# Patient Record
Sex: MALE | Race: AMERICAN INDIAN OR ALASKA NATIVE
[De-identification: names, ages, dates, MRNs, and addresses within clinical notes are randomized per-mention and may not be internally consistent; named-entity substitution may affect disease eponyms.]

---

## 2017-01-16 ENCOUNTER — HOSPITAL ENCOUNTER (INPATIENT)
Dept: HOSPITAL 5 - ED | Age: 47
LOS: 4 days | Discharge: HOME | DRG: 391 | End: 2017-01-20
Attending: HOSPITALIST | Admitting: HOSPITALIST
Payer: MEDICARE

## 2017-01-16 DIAGNOSIS — T50.995A: ICD-10-CM

## 2017-01-16 DIAGNOSIS — E87.2: ICD-10-CM
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
## 2017-01-16 DIAGNOSIS — K29.80: ICD-10-CM

## 2017-01-16 DIAGNOSIS — I12.0: ICD-10-CM

## 2017-01-16 DIAGNOSIS — D63.1: ICD-10-CM

## 2017-01-16 DIAGNOSIS — E21.3: ICD-10-CM

## 2017-01-16 DIAGNOSIS — N18.6: ICD-10-CM

## 2017-01-16 DIAGNOSIS — K29.00: Primary | ICD-10-CM

## 2017-01-16 LAB
ALBUMIN SERPL-MCNC: 4.4 G/DL (ref 3.9–5)
ALBUMIN/GLOB SERPL: 1.5 %
ALP SERPL-CCNC: 84 UNITS/L (ref 35–129)
ALT SERPL-CCNC: 15 UNITS/L (ref 7–56)
ANION GAP SERPL CALC-SCNC: 29 MMOL/L
APTT BLD: 35.1 SEC. (ref 24.2–36.6)
BACTERIA #/AREA URNS HPF: (no result) /HPF
BASOPHILS NFR BLD AUTO: 1.1 % (ref 0–1.8)
BILIRUB DIRECT SERPL-MCNC: < 0.2 MG/DL (ref 0–0.2)
BILIRUB INDIRECT SERPL-MCNC: 0.2 MG/DL
BILIRUB SERPL-MCNC: 0.4 MG/DL (ref 0.1–1.2)
BILIRUB UR QL STRIP: (no result)
BLOOD UR QL VISUAL: (no result)
BUN SERPL-MCNC: 77 MG/DL (ref 9–20)
BUN/CREAT SERPL: 4.96 %
CALCIUM SERPL-MCNC: 8.3 MG/DL (ref 8.4–10.2)
CHLORIDE SERPL-SCNC: 94.3 MMOL/L (ref 98–107)
CO2 SERPL-SCNC: 18 MMOL/L (ref 22–30)
EOSINOPHIL NFR BLD AUTO: 9.2 % (ref 0–4.3)
GLUCOSE SERPL-MCNC: 86 MG/DL (ref 75–100)
HCT VFR BLD CALC: 32.7 % (ref 35.5–45.6)
HGB BLD-MCNC: 10.6 GM/DL (ref 11.8–15.2)
INR PPP: 1.34 (ref 0.87–1.13)
KETONES UR STRIP-MCNC: (no result) MG/DL
LEUKOCYTE ESTERASE UR QL STRIP: (no result)
LIPASE SERPL-CCNC: 21 UNITS/L (ref 13–60)
MCH RBC QN AUTO: 31 PG (ref 28–32)
MCHC RBC AUTO-ENTMCNC: 33 % (ref 32–34)
MCV RBC AUTO: 96 FL (ref 84–94)
NITRITE UR QL STRIP: (no result)
PH UR STRIP: 7 [PH] (ref 5–7)
PLATELET # BLD: 173 K/MM3 (ref 140–440)
POTASSIUM SERPL-SCNC: 4.9 MMOL/L (ref 3.6–5)
PROT SERPL-MCNC: 7.4 G/DL (ref 6.3–8.2)
RBC # BLD AUTO: 3.42 M/MM3 (ref 3.65–5.03)
RBC #/AREA URNS HPF: 1 /HPF (ref 0–6)
SODIUM SERPL-SCNC: 136 MMOL/L (ref 137–145)
UROBILINOGEN UR-MCNC: < 2 MG/DL (ref ?–2)
WBC # BLD AUTO: 5.7 K/MM3 (ref 4.5–11)
WBC #/AREA URNS HPF: < 1 /HPF (ref 0–6)

## 2017-01-16 PROCEDURE — 84484 ASSAY OF TROPONIN QUANT: CPT

## 2017-01-16 PROCEDURE — 85730 THROMBOPLASTIN TIME PARTIAL: CPT

## 2017-01-16 PROCEDURE — 96375 TX/PRO/DX INJ NEW DRUG ADDON: CPT

## 2017-01-16 PROCEDURE — 80074 ACUTE HEPATITIS PANEL: CPT

## 2017-01-16 PROCEDURE — 96374 THER/PROPH/DIAG INJ IV PUSH: CPT

## 2017-01-16 PROCEDURE — 88305 TISSUE EXAM BY PATHOLOGIST: CPT

## 2017-01-16 PROCEDURE — 88342 IMHCHEM/IMCYTCHM 1ST ANTB: CPT

## 2017-01-16 PROCEDURE — 71020: CPT

## 2017-01-16 PROCEDURE — 81001 URINALYSIS AUTO W/SCOPE: CPT

## 2017-01-16 PROCEDURE — 85025 COMPLETE CBC W/AUTO DIFF WBC: CPT

## 2017-01-16 PROCEDURE — 80048 BASIC METABOLIC PNL TOTAL CA: CPT

## 2017-01-16 PROCEDURE — 93010 ELECTROCARDIOGRAM REPORT: CPT

## 2017-01-16 PROCEDURE — 93005 ELECTROCARDIOGRAM TRACING: CPT

## 2017-01-16 PROCEDURE — 83690 ASSAY OF LIPASE: CPT

## 2017-01-16 PROCEDURE — 85610 PROTHROMBIN TIME: CPT

## 2017-01-16 PROCEDURE — 36415 COLL VENOUS BLD VENIPUNCTURE: CPT

## 2017-01-16 PROCEDURE — 74176 CT ABD & PELVIS W/O CONTRAST: CPT

## 2017-01-16 PROCEDURE — C9113 INJ PANTOPRAZOLE SODIUM, VIA: HCPCS

## 2017-01-16 RX ADMIN — HYDRALAZINE HYDROCHLORIDE SCH MG: 25 TABLET, FILM COATED ORAL at 17:24

## 2017-01-16 RX ADMIN — HYDRALAZINE HYDROCHLORIDE SCH: 25 TABLET, FILM COATED ORAL at 21:45

## 2017-01-16 RX ADMIN — NIFEDIPINE SCH MG: 90 TABLET, EXTENDED RELEASE ORAL at 21:44

## 2017-01-16 RX ADMIN — FUROSEMIDE SCH MG: 40 TABLET ORAL at 21:44

## 2017-01-16 RX ADMIN — FAMOTIDINE SCH MG: 10 INJECTION, SOLUTION INTRAVENOUS at 17:24

## 2017-01-16 RX ADMIN — CARVEDILOL SCH MG: 25 TABLET, FILM COATED ORAL at 21:44

## 2017-01-16 NOTE — EMERGENCY DEPARTMENT REPORT
HPI





- General


Chief Complaint: Dyspnea/Respdistress


Time Seen by Provider: 01/16/17 14:08





- HPI


HPI: 





 


Chief complaint: Abdominal pain, constipation, decreased appetite and shortness 

of breath


HPI: Patient presents complaining of abdominal pain especially epigastric 

radiating to the entire abdomen since last Thursday.  Patient describes it as 

constant and sharp.  Patient states he's not had a bowel movement since 

Thursday and does not feel hungry.  Patient complains of nausea but no 

vomiting.  Patient does not think he's had a fever but he feels alternately hot 

and cold.  Patient states he's been constipated and had similar pain in the 

past but it appears to be worse this time.  Patient had his last dialysis 

Thursday and skipped dialysis on Saturday and is due for dialysis tomorrow.  

Patient denies any previous abdominal surgery.


Mode of arrival:  EMS


Source: Patient 


Began: Last Thursday


Duration: Continuous


Context: See above


Quality: Sharp


Severity:  8 out of 10


Improved with: Nothing


Worsened with: Palpation


Associated signs and symptoms: See above.  No edema. 





ED Past Medical Hx





- Past Medical History


Previous Medical History?: Yes


Hx Hypertension: Yes


Hx Renal Disease: Yes





- Surgical History


Past Surgical History?: Yes


Additional Surgical History: Left A/V Graft





- Medications


Home Medications: 


 Home Medications











 Medication  Instructions  Recorded  Confirmed  Last Taken  Type


 


Carvedilol [Coreg] 25 mg PO QDAY 01/16/17 01/16/17 01/15/17 History


 


Furosemide [Lasix TAB] 80 mg PO 4XW 01/16/17 01/16/17 01/15/17 History


 


Minoxidil [Loniten] 2.5 mg PO QDAY 01/16/17 01/16/17 01/15/17 History


 


NIFEdipine XL [Procardia Xl] 90 mg PO QDAY 01/16/17 01/16/17 01/15/17 History


 


Warfarin [Coumadin] 7.5 mg PO QDAY 01/16/17 01/16/17 01/15/17 History


 


cloNIDine-TTS PATCH [Catapres-Tts 1 patch TD Q7D 01/16/17 01/16/17 Unknown 

History





Patch]     


 


hydrALAZINE [Apresoline] 50 mg PO Q8HR 01/16/17 01/16/17 01/15/17 History














ED Review of Systems


ROS: 


Stated complaint: ROSALBA/ABD/BACK PAIN


Other details as noted in HPI


ROS





Constitutional: No fever 


ENT: No uri symptoms


Cardiovascular: No chest pain


Respiratory: No sob or cough


GI: No  vomiting or diarrhea


: End-stage renal disease on hemodialysis


Skin: No rash


Neuro: No focal weakness or numbness


Psych: No depression


Hema/lymph: No edema





Physical Exam





- Physical Exam


Vital Signs: 


 Vital Signs











  01/16/17 01/16/17





  08:22 14:05


 


Temperature 98.2 F 


 


Pulse Rate 82 74


 


Respiratory 18 18





Rate  


 


Blood Pressure 141/89 


 


Blood Pressure  165/95





[Right]  


 


O2 Sat by Pulse 97 95





Oximetry  











Physical Exam: 





GENERAL: The patient is well-developed well-nourished . 


HEENT: Normocephalic.  Atraumatic.  Extraocular motions are intact.  Patient 

has moist mucous membranes.


NECK: Supple.  No meningitic signs are noted.  There is no adenopathy noted.


CHEST/LUNGS: Clear to auscultation.  There is no respiratory distress noted.


HEART/CARDIOVASCULAR: Regular.  There is no tachycardia.  There is no gallop 

rub or murmur.


ABDOMEN: Abdomen is soft and tender epigastric area without rebound or 

guarding.  Patient has normal bowel sounds.  There is no abdominal distention.


SKIN: There is no rash.  There is no edema.  There is no diaphoresis.


NEURO: The patient is awake, alert, and oriented.  The patient is cooperative.  

The patient has no focal neurologic deficits.  The patient has normal speech.


MUSCULOSKELETAL: There is no tenderness or deformity.  There is no limitation 

range of motion.  There is no evidence of acute injury.





ED Course


 Vital Signs











  01/16/17 01/16/17





  08:22 14:05


 


Temperature 98.2 F 


 


Pulse Rate 82 74


 


Respiratory 18 18





Rate  


 


Blood Pressure 141/89 


 


Blood Pressure  165/95





[Right]  


 


O2 Sat by Pulse 97 95





Oximetry  














- Reevaluation(s)


Reevaluation #1: 





01/16/17 16:15


Discussed with nephrology who requested the patient be admitted to the hospital 

for further evaluation by gastroenterology and for dialysis.  Patient was 

medicated with morphine, Protonix and Zofran with improvement.  INR is pending.

  Patient will be admitted to the hospitalist.





ED Medical Decision Making





- Lab Data


Result diagrams: 


 01/16/17 09:00





 01/16/17 09:00





 Laboratory Tests











  01/16/17





  09:00


 


Estimated GFR  4


 


BUN/Creatinine Ratio  4.96


 


Calcium  8.3 L


 


Troponin T  < 0.010














- EKG Data


-: EKG Interpreted by Me


EKG shows normal: sinus rhythm (79)





- EKG Data


When compared to previous EKG there are: previous EKG unavailable


Interpretation: normal EKG (with possible left atrial enlargement.)





- Radiology Data


Radiology results: report reviewed (CT the abdomen does not show any acute 

changes other than a small amount of free fluid in the pelvis area.)


Critical care attestation.: 


If time is entered above; I have spent that time in minutes in the direct care 

of this critically ill patient, excluding procedure time.








ED Disposition


Clinical Impression: 


 Acute on chronic renal failure





Abdominal pain


Qualifiers:


 Abdominal location: generalized Qualified Code(s): R10.84 - Generalized 

abdominal pain


Disposition: OP ADMITTED AS IP TO THIS HOSP


Is pt being admited?: Yes


Does the pt Need Aspirin: No


Condition: Fair


Time of Disposition: 16:14

## 2017-01-16 NOTE — XRAY REPORT
CHEST X-RAY, 2 VIEWS



History: Shortness of breath.



Findings: No comparison. Heart size appears borderline. Normal 

pulmonary vascularity. The lungs are clear. No evidence for pneumonia, 

CHF or pneumothorax.



Impression: Borderline heart size. Lungs clear.

## 2017-01-16 NOTE — CONSULTATION
History of Present Illness





- History of Present Illness





thank you for the consultation


Patient was evaluated in the ER





history of present illness


Patient is a pleasant 46-year-old -American male who is currently 

established without practice for end-stage renal disease care.  Patient 

normally dialyzes on Tuesday Thursday Saturday at [facility] and 

recently started having somewhat acute onset of nausea vomiting and upper 

abdominal pain.  Patient denies having any fever but has had some chills.  He 

was unable to keep food down.  Upon further questioning it is found the patient 

has been taking Renvela which is potentially GI toxic binder and at times 

patient can have nausea vomiting with that.  No real seizures reported to be 

sick at home.  Patient has normally been tolerant trading his dialysis 

treatment well without any problems.








Past medical history is significant for


Alison is currently maintenance of a dialysis


Anemia and end-stage renal disease


Secondary hyperparathyroidism


Hypertension





Current allergies none





Home medications present medication: Reviewed








Social history: No history of any alcohol or tobacco use





Family history: No history of any renal relatedhis daughter in the family





All the labs and imaging Center review from this admission





Review of systems: Positive for nausea vomiting somewhat acute onset no 

associated fever but has had some chills no constipation completed a systems up 

and pertinent positive mentioned  system negative





Assessment and plan


End-stage renal disease currently on maintenance dialysis on Tuesday Thursday Saturday in no acute emergent indication for renal replacement therapy patient 

will receive hemodialysis in the morning


Abdominal pain severe with nausea with epigastric tenderness in a patient who 

has been taking binders for phosphorus which do have known GI toxicity patient 

will need to be seen by GI and possibly consider for endoscopy to rule out 

peptic ulcer disease


In the meantime continue to treat empirically for now


Hypertension we'll systolic blood pressure under 140-150 predialysis under 160


Anemia and end-stage renal disease to follow hemoglobin goal between 10-12


Secondary hyperparathyroidism for phosphorusAn PTH needs to be followed


Patient has reported fever or chills and at this time history monitor clinically





Medications and Allergies


 Allergies











Allergy/AdvReac Type Severity Reaction Status Date / Time


 


No Known Allergies Allergy   Unverified 01/16/17 08:22











 Home Medications











 Medication  Instructions  Recorded  Confirmed  Last Taken  Type


 


Carvedilol [Coreg] 25 mg PO QDAY 01/16/17 01/16/17 01/15/17 History


 


Furosemide [Lasix TAB] 80 mg PO 4XW 01/16/17 01/16/17 01/15/17 History


 


Minoxidil [Loniten] 2.5 mg PO QDAY 01/16/17 01/16/17 01/15/17 History


 


NIFEdipine XL [Procardia Xl] 90 mg PO QDAY 01/16/17 01/16/17 01/15/17 History


 


Warfarin [Coumadin] 7.5 mg PO QDAY 01/16/17 01/16/17 01/15/17 History


 


cloNIDine-TTS PATCH [Catapres-Tts 1 patch TD Q7D 01/16/17 01/16/17 Unknown 

History





Patch]     


 


hydrALAZINE [Apresoline] 50 mg PO Q8HR 01/16/17 01/16/17 01/15/17 History











Active Meds: 


Active Medications





Carvedilol (Coreg)  25 mg PO QDAY ARRON


Clonidine HCl (Catapres-Tts Patch)  0.3 mg TD Q7D ARRON


Famotidine (Pepcid)  20 mg IV QDAY ARRON


Hydralazine HCl (Apresoline)  50 mg PO Q8HR ARRON


Minoxidil (Loniten)  2.5 mg PO QDAY ARRON


Miscellaneous Medication (Furosemide [Lasix Tab])  80 mg PO 4XW ARRON


Nifedipine (Procardia Xl)  90 mg PO QDAY ARRON











Exam





- Vital Signs


Vital signs: 


 Vital Signs











Temp Pulse Resp BP Pulse Ox


 


 98.2 F   82   18   141/89   97 


 


 01/16/17 08:22  01/16/17 08:22  01/16/17 08:22  01/16/17 08:22  01/16/17 08:22














- General Appearance


General appearance: well-developed (no acute distress)


EENT: mucous membranes moist (no pharyngeal erythema)


Neck: Present: neck supple (without any thyroid problem last JVD carotid bruit)


Respiratory: Clear to Ascultation (clear to auscultation no crackles rales or 

wheezes)


Heart: regular (S1-S2 normal no S3-S4 or any rub)


Gastrointestinal: Present: normal (mild epigastric tenderness to deep palpation 

otherwise unremarkable examination)


Integumentary: no rash (minimal edema less than 1+)


Neurologic: no focal deficit (alert awake oriented times)





Results





- Lab Results





 01/16/17 09:00





 01/16/17 09:00


 Most recent lab results











Calcium  8.3 mg/dL (8.4-10.2)  L  01/16/17  09:00

## 2017-01-16 NOTE — HISTORY AND PHYSICAL REPORT
History of Present Illness


Date of examination: 01/16/17


Date of admission: 


 1/16/17





Chief complaint: 








 Abdominal pain





History of present illness: 








Patient is a 38-year-old gentleman who is on renal replacement therapy on 

Tuesdays ptosis and Saturdays, started complaining of abdominal pain.  Nausea 

but no vomiting.  Patient was epigastric in location.  Was seen by his 

nephrologist, Dr. Naranjo who advised patient to come to the emergency 

department for admission.  CT scan of abdomen was unremarkable for any acute 

event except for some fluid collection and pelvic region.  Patient was also 

found to be 77 and creatinine was 15.5.  Patient denies any fever, no 

hematemesis or melena.








Past History


Past Medical History: ESRD, hypertension


Past Surgical History: denies: No surgical history


Social history: lives with family.  denies: smoking, alcohol abuse, IV drug use


Family history: denies: CAD, stroke





Medications and Allergies


 Allergies











Allergy/AdvReac Type Severity Reaction Status Date / Time


 


No Known Allergies Allergy   Unverified 01/16/17 08:22











 Home Medications











 Medication  Instructions  Recorded  Confirmed  Last Taken  Type


 


Carvedilol [Coreg] 25 mg PO QDAY 01/16/17 01/16/17 01/15/17 History


 


Furosemide [Lasix TAB] 80 mg PO 4XW 01/16/17 01/16/17 01/15/17 History


 


Minoxidil [Loniten] 2.5 mg PO QDAY 01/16/17 01/16/17 01/15/17 History


 


NIFEdipine XL [Procardia Xl] 90 mg PO QDAY 01/16/17 01/16/17 01/15/17 History


 


Warfarin [Coumadin] 7.5 mg PO QDAY 01/16/17 01/16/17 01/15/17 History


 


cloNIDine-TTS PATCH [Catapres-Tts 1 patch TD Q7D 01/16/17 01/16/17 Unknown 

History





Patch]     


 


hydrALAZINE [Apresoline] 50 mg PO Q8HR 01/16/17 01/16/17 01/15/17 History











Active Meds: 


Active Medications





Carvedilol (Coreg)  25 mg PO QDAY ARRON


Clonidine HCl (Catapres-Tts Patch)   mg TD Q7D ARRON


Famotidine (Pepcid)  20 mg IV QDAY ARRON


Hydralazine HCl (Apresoline)  50 mg PO Q8HR ARRON


Minoxidil (Loniten)  2.5 mg PO QDAY ARRON


Miscellaneous Medication (Furosemide [Lasix Tab])  80 mg PO 4XW ARRON


Nifedipine (Procardia Xl)  90 mg PO QDAY ARRON











Exam





- Physical Exam


Narrative exam: 


Constitutional: Well Nouridhed and Well developed. 


Head: NC/ AT


Eyes: Denies any visual impairments.  No discharge from the eyes


Nose: Denies any rhinorrhea or epistaxis 


Throats: Denies any post nasal drainage.


Ears: Denies any hearing deficits


Cardiovascular system: Denies any chest pain, shortness of breath, orthopnea, 

paroxysmal nocturnal dyspnea, or palpitation.


Respiratory system: Denies any cough, difficulty breathing, wheezing, pleuritic 

chest pain,


Gastrointestinal system: Has epigastric abdominal pain, with nausea, no vomiting

, hematemesis or melena.


Neurological system: Denies any headache, slurred speech, facial droop, 

lateralizing weakness


Genitalia system: Denies any dysuria, urinary frequency or urgency, urethral 

discharge


Skin: No rashes, hyperpigmented spots.


Hematological: Denies any cervical tenderness hemorrhages or petechia.


Immunological: Denies any multiple septic spots,


Lymphatic: Denies any generalized lymphadenopathy.


Endocrine: Denies any polyuria, polydipsia, polyphagia.  No heat or cold 

intolerance.











- Constitutional


Vitals: 


 











Temp Pulse Resp BP Pulse Ox


 


 98.3 F   79   18   153/77   100 


 


 01/16/17 15:40  01/16/17 15:40  01/16/17 15:40  01/16/17 15:40  01/16/17 15:40











General appearance: Present: no acute distress, well-nourished





- EENT


Eyes: Present: PERRL


ENT: hearing intact, clear oral mucosa





- Neck


Neck: Present: supple, normal ROM





- Respiratory


Respiratory effort: normal


Respiratory: bilateral: CTA





- Cardiovascular


Heart Sounds: Present: S1 & S2.  Absent: rub, click





- Extremities


Extremities: pulses symmetrical, No edema


Peripheral Pulses: within normal limits





- Abdominal


General gastrointestinal: Present: soft, non-tender, non-distended, normal 

bowel sounds


Male genitourinary: Present: normal





- Integumentary


Integumentary: Present: clear, warm, dry





- Musculoskeletal


Musculoskeletal: gait normal, strength equal bilaterally





- Psychiatric


Psychiatric: appropriate mood/affect, intact judgment & insight





- Neurologic


Neurologic: CNII-XII intact, moves all extremities





Results





- Labs


CBC & Chem 7: 


 01/16/17 09:00





 01/16/17 09:00


Labs: 


 Abnormal lab results











  01/16/17 01/16/17 01/16/17 Range/Units





  09:00 09:00 16:01 


 


RBC   3.42 L   (3.65-5.03)  M/mm3


 


Hgb   10.6 L   (11.8-15.2)  gm/dl


 


Hct   32.7 L   (35.5-45.6)  %


 


MCV   96 H   (84-94)  fl


 


RDW   18.0 H   (13.2-15.2)  %


 


Mono % (Auto)   11.4 H   (0.0-7.3)  %


 


Eos % (Auto)   9.2 H   (0.0-4.3)  %


 


Eos #   0.5 H   (0.0-0.4)  K/mm3


 


PT    16.5 H  (12.2-14.9)  Sec.


 


INR    1.34 H  (0.87-1.13)  


 


Sodium  136 L    (137-145)  mmol/L


 


Chloride  94.3 L    ()  mmol/L


 


Carbon Dioxide  18 L    (22-30)  mmol/L


 


BUN  77 H    (9-20)  mg/dL


 


Creatinine  15.5 H    (0.8-1.5)  mg/dL


 


Calcium  8.3 L    (8.4-10.2)  mg/dL














Assessment and Plan





Assessment





1.  Epigastric abdominal pain with nausea


2.  Perinephric fluid collection of questionable significance


3.  End-stage renal disease on hemodialysis


4.  Anemia secondary to #3





Plan


Commence patient on Pepcid 20 mg 1 by mouth twice a day 


Zofran 4 mg every 6hrs when necessary nausea


Nephrology consult to continue hemodialysis





DVT prophylaxis with Lovenox  





Spent 31 minutes direct patient care during this admission process  














- Patient Problems


(1) Epigastric abdominal pain


Current Visit: Yes   Status: Acute   





(2) End-stage renal disease on hemodialysis


Current Visit: Yes   Status: Acute

## 2017-01-16 NOTE — ADMIT CRITERIA FORM
Admission Criteria Documentation: 





                                RENAL FAILURE, ACUTE





Clinical Indications for Admission to Inpatient Care





                                                                            (

Place 'X' for any and all applicable criteria):





Admission is indicated for ALL (if I & II) or III of the following [A](2)(3)(4)(

5)(6)(7):





[X ]I.  Acute renal failure as indicated by ANY ONE of the following:


      [ ]a)     A 3-fold rise in serum creatinine from baseline 


      [ ]b)     Serum creatinine greater than 4 mg/dL (354 micromoles/L) with 

an acute rise greater than 0.5 mg/dL (44.2 micromoles/L)


      [X ]c)     Reduction of more than 75% in estimated glomerular filtration 

rate from baseline


      [ ]d)     Estimated glomerular filtration rate less than 35 mL/min/1.73m2 

(0.59mL/sec/1.73m2)in a child up to 18 years of age


      [ ]e)     Anuria indicated by ALL of the following:


                 [ ]i)     Adequate volume status


                 [ ]ii)    Cessation of urine output indicated by ANY ONE of 

the following:


                           [ ]1)       Urine output less than 0.3 mL/kg/hr for 

24 hours


                           [ ]2)       Anuria (urine output less than 0.1 mL/kg/

hr) for 12 hours


[ X] II. Renal failure cannot be managed in an outpatient setting or 

observational care setting as indicating by ANY ONE of the following:


       [ ]a)   Altered mental status that is severe or persistent


       [ ]b)   Volume overload or Respiratory distress (eg, clinically 

significant pulmonary edema) that is severe or persistent


       [ ]c)   Cardiac arrhythmias of immediate concern


       [ ]d)   Hemodynamic instability


       [ ]e)   Clinically significant electrolyte abnormality that requires 

inpatient care (eg, hyperkalemia with severe ECG findings)[B]


       [ ]f)    Clinically significant metabolic abnormality (eg, acidosis) 

that is severe or persistent


       [X ]g)   Acute treatment of renal failure (eg, renal replacement therapy

) not feasible or appropriate in observational care setting


       [ ]h)   Clinical situation too unstable or uncertain (eg, inadequate 

urine output, ongoing decline in renal function, etiology unclear)


       [ ]i)    Necessary support and caregiver ability to comply with 

outpatient treatment cannot be arranged in 


                observation care timeframe (eg, within 24 hours)


       [X ]j)    Other significant finding or clinical condition judged not to 

be within scope of observation care





[ ]III.General contraindications and/or Inappropriate clinical situations for 

Observational Care in patients with Acute Renal Failure,


       when ANY ONE of the following is required:


        [ ]a)    Prediction of prolongation of LOS based on ANY ONE of the 

following may be considered as 


                  a contraindication for observational care 2, 3, 4, 5, 6, 7, 8

, 9, 10, 11


                 [ ]i)    Age > 65 yrs.


                 [ ]ii)   Patient arriving by ambulance


                 [ ]iii)  Patient with high acuity


                 [ ]iv)  Patient requiring vital sign monitoring


                 [ ]v)   Patient on IV medication


      [ ]b)   Systolic blood pressures  180mmHg 3,12


      [ ]c)   Patient with altered mental status including delirium and other 

alteration of consciousness, (3)


      [ ]d)   Patient whose discharge disposition will be to a skilled nursing 

home or rehabilitation home should 


               not be managed in Emergency Department Observation Unit. CMS 

rule requires 3 days hospital stay before such placement.3,13


      [ ]e)   Patient with failure to thrive due to broad array of etiologies 3,

16,17


      [ ]f)    Inability to ambulate 3,14








Extended stay beyond goal length of stay may be needed for(13) 


[ ]a)   Continuing uremic complications


[ ]b)   Care for comorbidities 


[ ]c)   Postpartum acute renal failure 


[ ]d)   Need for dialysis 








The original [employer] content created by [employer] has been revised. 


The portions of the content which have been revised are identified through the 

use of italic text or in bold, and [facility] 


has neither reviewed nor approved the modified material. All other unmodified 

content is copyright  [employer].





Please see references footnoted in the original [facility] edition 

2016





Admission Criteria Met: Yes

## 2017-01-17 PROCEDURE — 5A1D60Z: ICD-10-PCS | Performed by: INTERNAL MEDICINE

## 2017-01-17 RX ADMIN — FAMOTIDINE SCH MG: 10 INJECTION, SOLUTION INTRAVENOUS at 09:36

## 2017-01-17 RX ADMIN — CARVEDILOL SCH: 25 TABLET, FILM COATED ORAL at 09:37

## 2017-01-17 RX ADMIN — HYDRALAZINE HYDROCHLORIDE SCH: 25 TABLET, FILM COATED ORAL at 14:00

## 2017-01-17 RX ADMIN — HYDRALAZINE HYDROCHLORIDE SCH MG: 25 TABLET, FILM COATED ORAL at 21:27

## 2017-01-17 RX ADMIN — HYDRALAZINE HYDROCHLORIDE SCH: 25 TABLET, FILM COATED ORAL at 09:33

## 2017-01-17 RX ADMIN — NIFEDIPINE SCH: 90 TABLET, EXTENDED RELEASE ORAL at 09:35

## 2017-01-17 RX ADMIN — MINOXIDIL SCH: 2.5 TABLET ORAL at 09:34

## 2017-01-17 NOTE — PROGRESS NOTE
Assessment and Plan


end-stage renal disease patient is currently in maintenance or dialysis on 

Tuesday Thursday Saturday schedule he will go for hemodialysis today orders 

will be placed. counseling and education was done


Hypertension goal systolic blood pressure under 160 predialysis in his case to 

follow


Nausea vomiting abdominal pain patient who has been taking Renvela but she is 

known to have GI toxicity, 


patient will likely benefit from an upper GI study or gastroenterology 

evaluation


Interim treat empirically with proton pump inhibitor and possibly Carafate for 

2 weeks


anemia and end-stage renal disease to monitor and follow


We'll continue to follow make recommendation from renal standpoint








Subjective


Interval history: 


seen today for follow up no acute complaints of than intermittent nausea and 

abdominal pain overall somewhat better events of 24 hours vitals labs intake 

output medications were reviewed, patient is to be seen by gastroenterology 

service





Objective





- Vital Signs


Vital signs: 


 Vital Signs - 12hr











  01/16/17 01/17/17 01/17/17





  22:00 00:06 08:00


 


Temperature  98.2 F 98.1 F


 


Pulse Rate 81  


 


Pulse Rate [ 81 77 80





Right Radial]   


 


Respiratory 16 18 18





Rate   


 


Blood Pressure 151/92  


 


Blood Pressure  140/75 144/82





[Right Arm]   


 


O2 Sat by Pulse  98 98





Oximetry   














- General Appearance


General appearance: appears stated age


EENT: mucous membranes moist


Neck: no JVD


Respiratory: Present: Clear to Ascultation (no crackles rales or wheezes)


Cardiology: regular (S1-S2 normal)


Gastrointestinal: normal (soft mild epigastric tenderness)


Integumentary: no rash





- Lab





 01/16/17 09:00





 01/16/17 09:00


 Most recent lab results











Calcium  8.3 mg/dL (8.4-10.2)  L  01/16/17  09:00

## 2017-01-17 NOTE — PROGRESS NOTE
Assessment and Plan


Assessment and plan: 





1.  Abdominal pain/nausea


Possible secondary to toxicity from phosphorous binders


GI consulted for possible EGD to rule out peptic ulcer disease


Started on famotidine, when necessary antiemetics





2.  Small amount of simple free fluid in the pelvis


Questionable significance; monitor





3.  ESRD on HD


On regular schedule TTS


Nephrology following





4.  Anemia of chronic kidney disease


Epogen with HD per nephrology





5.  Hypertension


BP controlled on multiple medications (hydralazine, clonidine patch, minoxidil, 

Coreg, nifedipine) and fluid removal through HD





6.  DVT prophylaxis


Start heparin subcutaneous





History


Interval history: 





doing well, abd pain significantly improved





Hospitalist Physical





- Constitutional


Vitals: 


 











Temp Pulse Resp BP Pulse Ox


 


 98.1 F   74   16   137/77   98 


 


 01/17/17 10:30  01/17/17 13:30  01/17/17 10:30  01/17/17 13:30  01/17/17 08:00











General appearance: Present: no acute distress





- EENT


Eyes: Present: PERRL, EOM intact.  Absent: scleral icterus, conjunctival 

injection





- Neck


Neck: Present: supple, normal ROM.  Absent: masses or JVD





- Respiratory


Respiratory effort: normal


Respiratory: bilateral: CTA, negative: rhonchi, wheezing





- Cardiovascular


Rhythm: regular


Heart Sounds: Present: S1 & S2.  Absent: systolic murmur





- Extremities


Extremities: no ischemia





- Abdominal


General gastrointestinal: soft, tender, non-distended, normal bowel sounds


Localized gastrointestinal: tender: epigastric periumbilical





- Integumentary


Integumentary: Present: warm, dry.  Absent: jaundice, rash





- Psychiatric


Psychiatric: appropriate mood/affect





- Neurologic


Neurologic: CNII-XII intact, no focal deficits





Results





- Labs


CBC & Chem 7: 


 01/16/17 09:00





 01/16/17 09:00


Labs: 


 Laboratory Last Values











WBC  5.7 K/mm3 (4.5-11.0)   01/16/17  09:00    


 


RBC  3.42 M/mm3 (3.65-5.03)  L  01/16/17  09:00    


 


Hgb  10.6 gm/dl (11.8-15.2)  L  01/16/17  09:00    


 


Hct  32.7 % (35.5-45.6)  L  01/16/17  09:00    


 


MCV  96 fl (84-94)  H  01/16/17  09:00    


 


MCH  31 pg (28-32)   01/16/17  09:00    


 


MCHC  33 % (32-34)   01/16/17  09:00    


 


RDW  18.0 % (13.2-15.2)  H  01/16/17  09:00    


 


Plt Count  173 K/mm3 (140-440)   01/16/17  09:00    


 


Lymph % (Auto)  26.6 % (13.4-35.0)   01/16/17  09:00    


 


Mono % (Auto)  11.4 % (0.0-7.3)  H  01/16/17  09:00    


 


Eos % (Auto)  9.2 % (0.0-4.3)  H  01/16/17  09:00    


 


Baso % (Auto)  1.1 % (0.0-1.8)   01/16/17  09:00    


 


Lymph #  1.5 K/mm3 (1.2-5.4)   01/16/17  09:00    


 


Mono #  0.6 K/mm3 (0.0-0.8)   01/16/17  09:00    


 


Eos #  0.5 K/mm3 (0.0-0.4)  H  01/16/17  09:00    


 


Baso #  0.1 K/mm3 (0.0-0.1)   01/16/17  09:00    


 


Seg Neutrophils %  51.7 % (40.0-70.0)   01/16/17  09:00    


 


Seg Neutrophils #  2.9 K/mm3 (1.8-7.7)   01/16/17  09:00    


 


PT  16.5 Sec. (12.2-14.9)  H  01/16/17  16:01    


 


INR  1.34  (0.87-1.13)  H  01/16/17  16:01    


 


APTT  35.1 Sec. (24.2-36.6)   01/16/17  16:01    


 


Sodium  136 mmol/L (137-145)  L  01/16/17  09:00    


 


Potassium  4.9 mmol/L (3.6-5.0)   01/16/17  09:00    


 


Chloride  94.3 mmol/L ()  L  01/16/17  09:00    


 


Carbon Dioxide  18 mmol/L (22-30)  L  01/16/17  09:00    


 


Anion Gap  29 mmol/L  01/16/17  09:00    


 


BUN  77 mg/dL (9-20)  H  01/16/17  09:00    


 


Creatinine  15.5 mg/dL (0.8-1.5)  H  01/16/17  09:00    


 


Estimated GFR  4 ml/min  01/16/17  09:00    


 


BUN/Creatinine Ratio  4.96 %  01/16/17  09:00    


 


Glucose  86 mg/dL ()   01/16/17  09:00    


 


Calcium  8.3 mg/dL (8.4-10.2)  L  01/16/17  09:00    


 


Total Bilirubin  0.4 mg/dL (0.1-1.2)   01/16/17  09:00    


 


Direct Bilirubin  < 0.2 mg/dL (0-0.2)   01/16/17  09:00    


 


Indirect Bilirubin  0.2 mg/dL  01/16/17  09:00    


 


AST  20 units/L (5-40)   01/16/17  09:00    


 


ALT  15 units/L (7-56)   01/16/17  09:00    


 


Alkaline Phosphatase  84 units/L ()   01/16/17  09:00    


 


Troponin T  < 0.010 ng/mL (0.00-0.029)   01/16/17  09:00    


 


Total Protein  7.4 g/dL (6.3-8.2)   01/16/17  09:00    


 


Albumin  4.4 g/dL (3.9-5)   01/16/17  09:00    


 


Albumin/Globulin Ratio  1.5 %  01/16/17  09:00    


 


Lipase  21 units/L (13-60)   01/16/17  09:00    


 


Urine Color  Yellow  (Yellow)   01/16/17  15:40    


 


Urine Turbidity  Clear  (Clear)   01/16/17  15:40    


 


Urine pH  7.0  (5.0-7.0)   01/16/17  15:40    


 


Ur Specific Gravity  1.011  (1.003-1.030)   01/16/17  15:40    


 


Urine Protein  100 mg/dl mg/dL (Negative)   01/16/17  15:40    


 


Urine Glucose (UA)  50 mg/dL (Negative)   01/16/17  15:40    


 


Urine Ketones  Tr mg/dL (Negative)   01/16/17  15:40    


 


Urine Blood  Sm  (Negative)   01/16/17  15:40    


 


Urine Nitrite  Neg  (Negative)   01/16/17  15:40    


 


Urine Bilirubin  Neg  (Negative)   01/16/17  15:40    


 


Urine Urobilinogen  < 2.0 mg/dL (<2.0)   01/16/17  15:40    


 


Ur Leukocyte Esterase  Neg  (Negative)   01/16/17  15:40    


 


Urine WBC (Auto)  < 1.0 /HPF (0.0-6.0)   01/16/17  15:40    


 


Urine RBC (Auto)  1.0 /HPF (0.0-6.0)   01/16/17  15:40    


 


U Epithel Cells (Auto)  < 1.0 /HPF (0-13.0)   01/16/17  15:40    


 


Urine Bacteria (Auto)  1+ /HPF (Negative)   01/16/17  15:40    














- Imaging and Cardiology


CT scan - abdomen: report reviewed (small amount of free fluid in the pelvis)

## 2017-01-18 RX ADMIN — FAMOTIDINE SCH MG: 10 TABLET ORAL at 10:23

## 2017-01-18 RX ADMIN — CARVEDILOL SCH MG: 25 TABLET, FILM COATED ORAL at 10:23

## 2017-01-18 RX ADMIN — HYDRALAZINE HYDROCHLORIDE SCH MG: 25 TABLET, FILM COATED ORAL at 14:21

## 2017-01-18 RX ADMIN — HYDRALAZINE HYDROCHLORIDE SCH MG: 25 TABLET, FILM COATED ORAL at 22:01

## 2017-01-18 RX ADMIN — HYDRALAZINE HYDROCHLORIDE SCH MG: 25 TABLET, FILM COATED ORAL at 06:40

## 2017-01-18 RX ADMIN — FUROSEMIDE SCH MG: 40 TABLET ORAL at 17:51

## 2017-01-18 RX ADMIN — FAMOTIDINE SCH MG: 10 TABLET ORAL at 22:01

## 2017-01-18 RX ADMIN — MINOXIDIL SCH MG: 2.5 TABLET ORAL at 10:23

## 2017-01-18 RX ADMIN — NIFEDIPINE SCH MG: 90 TABLET, EXTENDED RELEASE ORAL at 10:24

## 2017-01-18 NOTE — GASTROENTEROLOGY PROGRESS NOTE
Assessment and Plan


47yo man with worsening epigastric pain, likely related to dyspepsia/gastritis 

from PO4 binders.  However, given his intolerance of PO, PUD should also be 

considered.





Rec:


1) Continue Pepcid


2) Clear liquids today and NPO after MN for EGD tomorrow


3) If PUD is found, will d/w Dr. Naranjo re: PPI therapy





Subjective


Date of service: 01/18/17


Principal diagnosis: Epig pain


Interval history: 


Asked to re-evaluate Mr. Barker for abdominal pain.  He developed worsening 

upper abdominal pain and an inability to tolerate his breakfast today.  Pain is 

described as burning.  No nausea/vomiting, signs of bleeding.  No CP/SOB.








Objective





- Constitutional


Vitals: 


 











Temp Pulse Resp BP Pulse Ox


 


 98.1 F   76   18   164/64   96 


 


 01/18/17 08:11  01/18/17 08:11  01/18/17 08:11  01/18/17 10:23  01/18/17 08:11











General appearance: no acute distress





- Neck


Neck: supple





- Respiratory


Respiratory: bilateral: CTA





- Cardiovascular


Rhythm: regular


Heart Sounds: Present: S1 & S2





- Extremities


Extremities: No edema





- Gastrointestinal


General gastrointestinal: Present: soft, tender (in epigastrium), non-distended

, normal bowel sounds





- Neurologic


Neurological: alert and oriented x3





- Psychiatric


Psychiatric: appropriate mood/affect





- Labs


CBC & Chem 7: 


 01/16/17 09:00





 01/16/17 09:00

## 2017-01-18 NOTE — PROGRESS NOTE
Assessment and Plan


end-stage renal disease patient is currently in maintenance hemodialysis on 

Tuesday Thursday Saturday schedule


Patient will receive his hemodialysis in the morning


mild metabolic acidosis should improve with hemodialysis


Still continues to have abdominal pain poor appetite and nausea patient likely 

will benefit from upper endoscopy


Gastroenterology services currently following treat with antibiotic proton pump 

inhibitors and possibly Carafate for 2 weeks


Patient was advised not to use Renvela,discussed with gastroenterology services 

patient will likely require upper endoscopy tomorrow


CT scan showed traces of fluid in the pelvis which can be seen sometime in 

dialysis patient GI to comment


Monitor dialysis related labs and follow-up








Subjective


Principal diagnosis: Epig pain


Interval history: 


seen today for follow-up patient still continues to complain of abdominal pain 

and was unable to eat his breakfast


Appetite is still very poor, events of 24 hours vitals labs intake output 

medications were reviewed


Patient is currently status post gastroenterology evaluation denies any 

constipation today








Objective





- Vital Signs


Vital signs: 


 Vital Signs - 12hr











  01/18/17 01/18/17 01/18/17





  06:40 08:11 10:23


 


Temperature  98.1 F 


 


Pulse Rate 70  


 


Pulse Rate [  76 





Right Radial]   


 


Respiratory  18 





Rate   


 


Blood Pressure 165/92  164/64


 


Blood Pressure  145/76 





[Right Arm]   


 


O2 Sat by Pulse  96 





Oximetry   














- General Appearance


General appearance: appears stated age


EENT: mucous membranes moist


Neck: no JVD


Respiratory: Present: Clear to Ascultation


Cardiology: regular (S1 and S2 normal)


Gastrointestinal: other (still continues to have epigastric tenderness)


Integumentary: other (o edema)


Neurologic: other ()





- Lab





 01/16/17 09:00





 01/16/17 09:00


 Most recent lab results











Calcium  8.3 mg/dL (8.4-10.2)  L  01/16/17  09:00

## 2017-01-18 NOTE — PROGRESS NOTE
Assessment and Plan


Assessment and plan: 





1.  Abdominal pain/nausea


Possible secondary to toxicity from phosphorous binders


As pain persistent and associated with dyspepsia, PUD in differential, so GI 

consulted and scheduled for EGD tomorrow morning


Continue Pepcid for now





2.  Small amount of simple free fluid in the pelvis


Questionable significance; monitor





3.  ESRD on HD


On regular schedule TTS


Nephrology following





4.  Anemia of chronic kidney disease


Epogen with HD per nephrology





5.  Hypertension


BP controlled on multiple medications (hydralazine, clonidine patch, minoxidil, 

Coreg, nifedipine) and fluid removal through HD





6.  DVT prophylaxis


Heparin subcutaneous





History


Interval history: 





worsening epigastric pain this morning and not tolerating po 





Hospitalist Physical





- Constitutional


Vitals: 


 











Temp Pulse Resp BP Pulse Ox


 


 98.1 F   76   18   164/64   96 


 


 01/18/17 08:11  01/18/17 08:11  01/18/17 08:11  01/18/17 10:23  01/18/17 08:11











General appearance: Present: no acute distress





- EENT


Eyes: Present: PERRL, EOM intact.  Absent: scleral icterus, conjunctival 

injection





- Neck


Neck: Present: supple, normal ROM.  Absent: masses or JVD





- Respiratory


Respiratory effort: normal


Respiratory: bilateral: CTA, negative: rales, rhonchi, wheezing





- Cardiovascular


Rhythm: regular


Heart Sounds: Present: S1 & S2.  Absent: systolic murmur





- Extremities


Extremities: no ischemia





- Abdominal


General gastrointestinal: soft, tender, non-distended, normal bowel sounds


Localized gastrointestinal: tender: epigastric periumbilical





- Psychiatric


Psychiatric: cooperative





- Neurologic


Neurologic: CNII-XII intact, no focal deficits





Results





- Labs


CBC & Chem 7: 


 01/16/17 09:00





 01/16/17 09:00


Labs: 


 Laboratory Last Values











WBC  5.7 K/mm3 (4.5-11.0)   01/16/17  09:00    


 


RBC  3.42 M/mm3 (3.65-5.03)  L  01/16/17  09:00    


 


Hgb  10.6 gm/dl (11.8-15.2)  L  01/16/17  09:00    


 


Hct  32.7 % (35.5-45.6)  L  01/16/17  09:00    


 


MCV  96 fl (84-94)  H  01/16/17  09:00    


 


MCH  31 pg (28-32)   01/16/17  09:00    


 


MCHC  33 % (32-34)   01/16/17  09:00    


 


RDW  18.0 % (13.2-15.2)  H  01/16/17  09:00    


 


Plt Count  173 K/mm3 (140-440)   01/16/17  09:00    


 


Lymph % (Auto)  26.6 % (13.4-35.0)   01/16/17  09:00    


 


Mono % (Auto)  11.4 % (0.0-7.3)  H  01/16/17  09:00    


 


Eos % (Auto)  9.2 % (0.0-4.3)  H  01/16/17  09:00    


 


Baso % (Auto)  1.1 % (0.0-1.8)   01/16/17  09:00    


 


Lymph #  1.5 K/mm3 (1.2-5.4)   01/16/17  09:00    


 


Mono #  0.6 K/mm3 (0.0-0.8)   01/16/17  09:00    


 


Eos #  0.5 K/mm3 (0.0-0.4)  H  01/16/17  09:00    


 


Baso #  0.1 K/mm3 (0.0-0.1)   01/16/17  09:00    


 


Seg Neutrophils %  51.7 % (40.0-70.0)   01/16/17  09:00    


 


Seg Neutrophils #  2.9 K/mm3 (1.8-7.7)   01/16/17  09:00    


 


PT  16.5 Sec. (12.2-14.9)  H  01/16/17  16:01    


 


INR  1.34  (0.87-1.13)  H  01/16/17  16:01    


 


APTT  35.1 Sec. (24.2-36.6)   01/16/17  16:01    


 


Sodium  136 mmol/L (137-145)  L  01/16/17  09:00    


 


Potassium  4.9 mmol/L (3.6-5.0)   01/16/17  09:00    


 


Chloride  94.3 mmol/L ()  L  01/16/17  09:00    


 


Carbon Dioxide  18 mmol/L (22-30)  L  01/16/17  09:00    


 


Anion Gap  29 mmol/L  01/16/17  09:00    


 


BUN  77 mg/dL (9-20)  H  01/16/17  09:00    


 


Creatinine  15.5 mg/dL (0.8-1.5)  H  01/16/17  09:00    


 


Estimated GFR  4 ml/min  01/16/17  09:00    


 


BUN/Creatinine Ratio  4.96 %  01/16/17  09:00    


 


Glucose  86 mg/dL ()   01/16/17  09:00    


 


Calcium  8.3 mg/dL (8.4-10.2)  L  01/16/17  09:00    


 


Total Bilirubin  0.4 mg/dL (0.1-1.2)   01/16/17  09:00    


 


Direct Bilirubin  < 0.2 mg/dL (0-0.2)   01/16/17  09:00    


 


Indirect Bilirubin  0.2 mg/dL  01/16/17  09:00    


 


AST  20 units/L (5-40)   01/16/17  09:00    


 


ALT  15 units/L (7-56)   01/16/17  09:00    


 


Alkaline Phosphatase  84 units/L ()   01/16/17  09:00    


 


Troponin T  < 0.010 ng/mL (0.00-0.029)   01/16/17  09:00    


 


Total Protein  7.4 g/dL (6.3-8.2)   01/16/17  09:00    


 


Albumin  4.4 g/dL (3.9-5)   01/16/17  09:00    


 


Albumin/Globulin Ratio  1.5 %  01/16/17  09:00    


 


Lipase  21 units/L (13-60)   01/16/17  09:00    


 


Urine Color  Yellow  (Yellow)   01/16/17  15:40    


 


Urine Turbidity  Clear  (Clear)   01/16/17  15:40    


 


Urine pH  7.0  (5.0-7.0)   01/16/17  15:40    


 


Ur Specific Gravity  1.011  (1.003-1.030)   01/16/17  15:40    


 


Urine Protein  100 mg/dl mg/dL (Negative)   01/16/17  15:40    


 


Urine Glucose (UA)  50 mg/dL (Negative)   01/16/17  15:40    


 


Urine Ketones  Tr mg/dL (Negative)   01/16/17  15:40    


 


Urine Blood  Sm  (Negative)   01/16/17  15:40    


 


Urine Nitrite  Neg  (Negative)   01/16/17  15:40    


 


Urine Bilirubin  Neg  (Negative)   01/16/17  15:40    


 


Urine Urobilinogen  < 2.0 mg/dL (<2.0)   01/16/17  15:40    


 


Ur Leukocyte Esterase  Neg  (Negative)   01/16/17  15:40    


 


Urine WBC (Auto)  < 1.0 /HPF (0.0-6.0)   01/16/17  15:40    


 


Urine RBC (Auto)  1.0 /HPF (0.0-6.0)   01/16/17  15:40    


 


U Epithel Cells (Auto)  < 1.0 /HPF (0-13.0)   01/16/17  15:40    


 


Urine Bacteria (Auto)  1+ /HPF (Negative)   01/16/17  15:40

## 2017-01-19 PROCEDURE — 0DB68ZX EXCISION OF STOMACH, VIA NATURAL OR ARTIFICIAL OPENING ENDOSCOPIC, DIAGNOSTIC: ICD-10-PCS | Performed by: INTERNAL MEDICINE

## 2017-01-19 PROCEDURE — 0DB38ZX EXCISION OF LOWER ESOPHAGUS, VIA NATURAL OR ARTIFICIAL OPENING ENDOSCOPIC, DIAGNOSTIC: ICD-10-PCS | Performed by: INTERNAL MEDICINE

## 2017-01-19 PROCEDURE — 0DB98ZX EXCISION OF DUODENUM, VIA NATURAL OR ARTIFICIAL OPENING ENDOSCOPIC, DIAGNOSTIC: ICD-10-PCS | Performed by: INTERNAL MEDICINE

## 2017-01-19 RX ADMIN — MINOXIDIL SCH: 2.5 TABLET ORAL at 10:52

## 2017-01-19 RX ADMIN — NIFEDIPINE SCH: 90 TABLET, EXTENDED RELEASE ORAL at 10:52

## 2017-01-19 RX ADMIN — HYDRALAZINE HYDROCHLORIDE SCH MG: 25 TABLET, FILM COATED ORAL at 21:58

## 2017-01-19 RX ADMIN — MINOXIDIL SCH MG: 2.5 TABLET ORAL at 19:00

## 2017-01-19 RX ADMIN — FAMOTIDINE SCH MG: 10 TABLET ORAL at 21:57

## 2017-01-19 RX ADMIN — NIFEDIPINE SCH MG: 90 TABLET, EXTENDED RELEASE ORAL at 19:01

## 2017-01-19 RX ADMIN — HYDRALAZINE HYDROCHLORIDE SCH MG: 25 TABLET, FILM COATED ORAL at 18:59

## 2017-01-19 RX ADMIN — HYDRALAZINE HYDROCHLORIDE SCH: 25 TABLET, FILM COATED ORAL at 15:23

## 2017-01-19 RX ADMIN — HYDRALAZINE HYDROCHLORIDE SCH: 25 TABLET, FILM COATED ORAL at 06:38

## 2017-01-19 RX ADMIN — CARVEDILOL SCH: 25 TABLET, FILM COATED ORAL at 10:52

## 2017-01-19 RX ADMIN — FAMOTIDINE SCH: 10 TABLET ORAL at 10:52

## 2017-01-19 NOTE — POST ANESTHESIA EVALUATION
- Post Anesthesia Evaluation


Patient Participated: Yes


Airway Patent: Yes


Stable Respiratory Function: Yes


Temp > 96.8F: Yes


Pain Manageable: Yes


Adequeate Hydration: Yes


Anesthesia Complications: No


Block Receding Appropriately: Not Applicable

## 2017-01-19 NOTE — ANESTHESIA CONSULTATION
Anesthesia Consult and Med Hx


Date of service: 01/19/17





- Airway


Anesthetic Teeth Evaluation: Good


ROM Head & Neck: Adequate


Mental/Hyoid Distance: Adequate


Mallampati Class: Class II


Intubation Access Assessment: Good





- Pulmonary Exam


CTA: Yes





- Cardiac Exam


Cardiac Exam: RRR





- Pre-Operative Health Status


ASA Pre-Surgery Classification: ASA3


Proposed Anesthetic Plan: MAC





- Cardiovascular System


Hx Hypertension: Yes





- Central Nervous System


CVA: Yes (2012)





- Endocrine


Hx Renal Disease: Yes


Hx End Stage Renal Disease: Yes (dialyzed today)

## 2017-01-19 NOTE — EVENT NOTE
Came to see the patient around 4:30 in the evening according to the nurse 

patient went for hemodialysis which she tolerated well currently he has gone 

down for endoscopy


Vital labs medications were reviewed


We will follow him in the morning

## 2017-01-19 NOTE — POST OPERATIVE NOTE
Pre-op diagnosis: Epigastric pain


Post-op diagnosis: other (epigastric pain, gastritis/duodenitis)


Findings: 


EGD


Esophagus: irreg Z-line, suspicious for Vigil's.  Biopsied


Stomach: Erythematous mucosa in antrum; biopsied


Duodenum: Erythematous mucosa in bulb; biopsied





Imp: Epig pain/anorexia, possibly due to gastritis/duodenitis.  Incidentally 

irreg Z-line, suspicious for Vigil's


Rec:


1) Return to floor


2) Advance diet


3) Would have nephro comment on whether he can be on PPI therapy, if need be


4) F/U path





Procedure: 


EGD





Anesthesia: MAC


Surgeon: MARY SUGGS


Estimated blood loss: minimal


Pathology: list (GE junction, antrum, duodenum)


Specimen disposition: to lab


Condition: stable


Disposition: floor

## 2017-01-19 NOTE — PROGRESS NOTE
Subjective


Principal diagnosis: Epig pain





Objective





- Vital Signs


Vital signs: 


 Vital Signs - 12hr











  01/18/17 01/18/17 01/18/17





  22:01 23:00 23:04


 


Temperature  98 F 


 


Pulse Rate   


 


Pulse Rate [  64 





Right Radial]   


 


Respiratory  18 





Rate   


 


Respiratory   20





Rate [Abdomen]   


 


Blood Pressure 144/81  


 


Blood Pressure  144/81 





[Right Arm]   


 


O2 Sat by Pulse  98 





Oximetry   














  01/19/17





  06:38


 


Temperature 


 


Pulse Rate 72


 


Pulse Rate [ 





Right Radial] 


 


Respiratory 





Rate 


 


Respiratory 





Rate [Abdomen] 


 


Blood Pressure 133/77


 


Blood Pressure 





[Right Arm] 


 


O2 Sat by Pulse 





Oximetry 














- Lab





 01/16/17 09:00





 01/16/17 09:00


 Most recent lab results











Calcium  8.3 mg/dL (8.4-10.2)  L  01/16/17  09:00

## 2017-01-19 NOTE — PROGRESS NOTE
Assessment and Plan


Assessment and plan: 





1.  Abdominal pain/nausea


Possible secondary to toxicity from phosphorous binders


As pain persistent and associated with dyspepsia, PUD in differential, so GI 

consulted and underwent EGD today that revealed esophagus suspicious for Vigil

's and erythematous mucosa in the antrum and duodenal bulb; biopsies were 

obtained; likely PPI needed; will discuss tomorrow with GI and nephrology





2.  Small amount of simple free fluid in the pelvis


Questionable significance; monitor





3.  ESRD on HD


On regular schedule TTS


Nephrology following





4.  Anemia of chronic kidney disease


Epogen with HD per nephrology





5.  Hypertension


BP controlled on multiple medications (hydralazine, clonidine patch, minoxidil, 

Coreg, nifedipine) and fluid removal through HD





6.  DVT prophylaxis


Heparin subcutaneous





History


Interval history: 





s/ EGD today as well as HD





Hospitalist Physical





- Constitutional


Vitals: 


 











Temp Pulse Resp BP Pulse Ox


 


 98.1 F   69   16   175/93   99 


 


 01/19/17 17:30  01/19/17 18:59  01/19/17 17:30  01/19/17 18:59  01/19/17 17:30











General appearance: Present: no acute distress





- EENT


Eyes: Present: PERRL, EOM intact.  Absent: scleral icterus, conjunctival 

injection





- Neck


Neck: Present: supple, normal ROM.  Absent: masses or JVD





- Respiratory


Respiratory effort: normal


Respiratory: bilateral: CTA, negative: rales, rhonchi





- Cardiovascular


Rhythm: regular


Heart Sounds: Present: S1 & S2.  Absent: systolic murmur





- Extremities


Extremities: no ischemia





- Abdominal


General gastrointestinal: soft, non-tender, non-distended, normal bowel sounds





- Integumentary


Integumentary: Present: warm, dry.  Absent: jaundice, rash





- Neurologic


Neurologic: CNII-XII intact, no focal deficits





Results





- Labs


CBC & Chem 7: 


 01/16/17 09:00





 01/16/17 09:00


Labs: 


 Laboratory Last Values











WBC  5.7 K/mm3 (4.5-11.0)   01/16/17  09:00    


 


RBC  3.42 M/mm3 (3.65-5.03)  L  01/16/17  09:00    


 


Hgb  10.6 gm/dl (11.8-15.2)  L  01/16/17  09:00    


 


Hct  32.7 % (35.5-45.6)  L  01/16/17  09:00    


 


MCV  96 fl (84-94)  H  01/16/17  09:00    


 


MCH  31 pg (28-32)   01/16/17  09:00    


 


MCHC  33 % (32-34)   01/16/17  09:00    


 


RDW  18.0 % (13.2-15.2)  H  01/16/17  09:00    


 


Plt Count  173 K/mm3 (140-440)   01/16/17  09:00    


 


Lymph % (Auto)  26.6 % (13.4-35.0)   01/16/17  09:00    


 


Mono % (Auto)  11.4 % (0.0-7.3)  H  01/16/17  09:00    


 


Eos % (Auto)  9.2 % (0.0-4.3)  H  01/16/17  09:00    


 


Baso % (Auto)  1.1 % (0.0-1.8)   01/16/17  09:00    


 


Lymph #  1.5 K/mm3 (1.2-5.4)   01/16/17  09:00    


 


Mono #  0.6 K/mm3 (0.0-0.8)   01/16/17  09:00    


 


Eos #  0.5 K/mm3 (0.0-0.4)  H  01/16/17  09:00    


 


Baso #  0.1 K/mm3 (0.0-0.1)   01/16/17  09:00    


 


Seg Neutrophils %  51.7 % (40.0-70.0)   01/16/17  09:00    


 


Seg Neutrophils #  2.9 K/mm3 (1.8-7.7)   01/16/17  09:00    


 


PT  16.5 Sec. (12.2-14.9)  H  01/16/17  16:01    


 


INR  1.34  (0.87-1.13)  H  01/16/17  16:01    


 


APTT  35.1 Sec. (24.2-36.6)   01/16/17  16:01    


 


Sodium  136 mmol/L (137-145)  L  01/16/17  09:00    


 


Potassium  4.9 mmol/L (3.6-5.0)   01/16/17  09:00    


 


Chloride  94.3 mmol/L ()  L  01/16/17  09:00    


 


Carbon Dioxide  18 mmol/L (22-30)  L  01/16/17  09:00    


 


Anion Gap  29 mmol/L  01/16/17  09:00    


 


BUN  77 mg/dL (9-20)  H  01/16/17  09:00    


 


Creatinine  15.5 mg/dL (0.8-1.5)  H  01/16/17  09:00    


 


Estimated GFR  4 ml/min  01/16/17  09:00    


 


BUN/Creatinine Ratio  4.96 %  01/16/17  09:00    


 


Glucose  86 mg/dL ()   01/16/17  09:00    


 


Calcium  8.3 mg/dL (8.4-10.2)  L  01/16/17  09:00    


 


Total Bilirubin  0.4 mg/dL (0.1-1.2)   01/16/17  09:00    


 


Direct Bilirubin  < 0.2 mg/dL (0-0.2)   01/16/17  09:00    


 


Indirect Bilirubin  0.2 mg/dL  01/16/17  09:00    


 


AST  20 units/L (5-40)   01/16/17  09:00    


 


ALT  15 units/L (7-56)   01/16/17  09:00    


 


Alkaline Phosphatase  84 units/L ()   01/16/17  09:00    


 


Troponin T  < 0.010 ng/mL (0.00-0.029)   01/16/17  09:00    


 


Total Protein  7.4 g/dL (6.3-8.2)   01/16/17  09:00    


 


Albumin  4.4 g/dL (3.9-5)   01/16/17  09:00    


 


Albumin/Globulin Ratio  1.5 %  01/16/17  09:00    


 


Lipase  21 units/L (13-60)   01/16/17  09:00    


 


Urine Color  Yellow  (Yellow)   01/16/17  15:40    


 


Urine Turbidity  Clear  (Clear)   01/16/17  15:40    


 


Urine pH  7.0  (5.0-7.0)   01/16/17  15:40    


 


Ur Specific Gravity  1.011  (1.003-1.030)   01/16/17  15:40    


 


Urine Protein  100 mg/dl mg/dL (Negative)   01/16/17  15:40    


 


Urine Glucose (UA)  50 mg/dL (Negative)   01/16/17  15:40    


 


Urine Ketones  Tr mg/dL (Negative)   01/16/17  15:40    


 


Urine Blood  Sm  (Negative)   01/16/17  15:40    


 


Urine Nitrite  Neg  (Negative)   01/16/17  15:40    


 


Urine Bilirubin  Neg  (Negative)   01/16/17  15:40    


 


Urine Urobilinogen  < 2.0 mg/dL (<2.0)   01/16/17  15:40    


 


Ur Leukocyte Esterase  Neg  (Negative)   01/16/17  15:40    


 


Urine WBC (Auto)  < 1.0 /HPF (0.0-6.0)   01/16/17  15:40    


 


Urine RBC (Auto)  1.0 /HPF (0.0-6.0)   01/16/17  15:40    


 


U Epithel Cells (Auto)  < 1.0 /HPF (0-13.0)   01/16/17  15:40    


 


Urine Bacteria (Auto)  1+ /HPF (Negative)   01/16/17  15:40

## 2017-01-19 NOTE — OPERATIVE REPORT
PROCEDURE PERFORMED:  Upper endoscopy with biopsy.



PREOPERATIVE DIAGNOSES:  Epigastric pain, dyspepsia.



POSTOPERATIVE DIAGNOSES:  Epigastric pain, dyspepsia,

gastritis/duodenitis/irregular Z line, rule out Vigil's esophagus.



ANESTHESIA:  Via monitored anesthesia care.



DESCRIPTION OF PROCEDURE:  After informed consent was obtained, the patient was

placed in left lateral decubitus position.  A standard Fujinon upper endoscope

was inserted into the mouth and advanced to the second portion of duodenum. 

Scope was then carefully withdrawn and mucosa was carefully examined.



FINDINGS:

Esophagus:  The Z-line appeared irregular at 38 cm from the oral entry site

suspicious for Vigil's esophagus.  Cold forceps biopsies were taken and sent

for pathology.



Stomach:  There was evidence of erythematous mucosa in the gastric antrum.  Cold

forceps biopsies were taken and sent for pathology.



Duodenum:  There was erythematous mucosa noted in the duodenal bulb.  Cold

forceps biopsies were taken and sent for pathology.



COMPLICATIONS:  None.



ESTIMATED BLOOD LOSS:  Minimal.



IMPRESSION:  A 46-year-old gentleman with end-stage renal disease, on phosphate

binders, presenting with epigastric pain/anorexia/dyspeptic symptoms, possibly

related to gastritis and duodenitis from the phosphate binders.  Incidentally, 
he

was found to have an irregular Z line which is suspicious for Vigil's

esophagus.



RECOMMENDATIONS:

1.  Return to floor.

2.  Advance diet to clears and further as tolerated.

3.  I would have Nephrology comment on whether or not the patient can safely be

    on a proton pump inhibitor therapy, if need be.

4.  Follow up pathology.





DD: 01/19/2017 17:02

DT: 01/19/2017 20:24

JOB# [id number]  [id number]

LUCIANA/JAZMIN CALDERON

## 2017-01-20 VITALS — SYSTOLIC BLOOD PRESSURE: 140 MMHG | DIASTOLIC BLOOD PRESSURE: 83 MMHG

## 2017-01-20 RX ADMIN — HYDRALAZINE HYDROCHLORIDE SCH MG: 25 TABLET, FILM COATED ORAL at 05:59

## 2017-01-20 RX ADMIN — NIFEDIPINE SCH MG: 90 TABLET, EXTENDED RELEASE ORAL at 10:10

## 2017-01-20 RX ADMIN — CARVEDILOL SCH MG: 25 TABLET, FILM COATED ORAL at 10:10

## 2017-01-20 RX ADMIN — MINOXIDIL SCH MG: 2.5 TABLET ORAL at 10:10

## 2017-01-20 RX ADMIN — HYDRALAZINE HYDROCHLORIDE SCH MG: 25 TABLET, FILM COATED ORAL at 14:36

## 2017-01-20 RX ADMIN — FAMOTIDINE SCH MG: 10 TABLET ORAL at 10:10

## 2017-01-20 NOTE — PROGRESS NOTE
Assessment and Plan


end-stage renal disease currently on maintenance in a dialysis patient is doing 

well


We will see him for follow-up in the dialysis clinic


Counseling and education was done regarding hospital related comorbidities


His appetite is good his able to keep food down without any associated nausea 

vomiting


I have also discussed the care plan with Hospital medicine about the 

prescriptions for Prilosec and Carafate to be given to the patient


EGD findings were noted.





Subjective


Principal diagnosis: Epig pain





Objective





- Vital Signs


Vital signs: 


 Vital Signs - 12hr











  01/20/17 01/20/17 01/20/17





  01:57 05:59 07:40


 


Temperature 98.1 F  98.1 F


 


Pulse Rate  78 


 


Pulse Rate [ 72  75





Right Radial]   


 


Respiratory 18  16





Rate   


 


Blood Pressure  123/71 


 


Blood Pressure 126/74  132/73





[Right Arm]   


 


O2 Sat by Pulse 100  98





Oximetry   














  01/20/17





  10:10


 


Temperature 


 


Pulse Rate 75


 


Pulse Rate [ 





Right Radial] 


 


Respiratory 





Rate 


 


Blood Pressure 132/73


 


Blood Pressure 





[Right Arm] 


 


O2 Sat by Pulse 





Oximetry 














- Lab





 01/16/17 09:00





 01/16/17 09:00


 Most recent lab results











Calcium  8.3 mg/dL (8.4-10.2)  L  01/16/17  09:00

## 2017-01-20 NOTE — GASTROENTEROLOGY PROGRESS NOTE
Assessment and Plan


1) Epigastric pain


2) Dyspepsia


3) Gastritis/duodenitis based on EGD findings


4) Irregular Z-line, possibly c/w Vigil's





-Can continue current diet


-Cont Pepcid and would have nephro comment on safety of PPI usage


-Await pathology


-Outpatient f/u in 2 weeks





No further inpatient GI w/u planned.  Please call with questions.  Thank you!








Subjective


Date of service: 01/20/17


Principal diagnosis: Epig pain


Interval history: 


Pt seen/examined today.  S/P EGD yesterday with evidence of irreg Z-line, 

gastritis/duodenitis.  Tolerated diet w/o problems.  No other acute overnight 

events.








Objective





- Constitutional


Vitals: 


 











Temp Pulse Resp BP Pulse Ox


 


 98.1 F   78   18   123/71   100 


 


 01/20/17 01:57  01/20/17 05:59  01/20/17 01:57  01/20/17 05:59  01/20/17 01:57











General appearance: no acute distress





- Neck


Neck: supple





- Respiratory


Respiratory: bilateral: CTA





- Cardiovascular


Rhythm: regular


Heart Sounds: Present: S1 & S2





- Extremities


Extremities: No edema





- Gastrointestinal


General gastrointestinal: Present: soft, non-tender, non-distended, normal 

bowel sounds





- Neurologic


Neurological: alert and oriented x3





- Labs


CBC & Chem 7: 


 01/16/17 09:00





 01/16/17 09:00

## 2017-01-20 NOTE — DISCHARGE SUMMARY
Providers





- Providers


Date of Admission: 


01/16/17 16:16





Date of discharge: 01/20/17


Attending physician: 


SANDY LAMBERT





 





01/16/17 16:18


Consult to Physician [CONS] Urgent 


   Consulting Provider: PADMINI MACIAS


   Reason For Exam: renal failure


   Notified:: yes





01/16/17 16:19


Consult to Physician [CONS] Urgent 


   Consulting Provider: DEBORAH MILLAN ASS


   Reason For Exam: abdominal pain


   Place consult to:: [facility]


   Notified:: no











Primary care physician: 


EAMON DALY








Hospitalization


Condition: Stable


Disposition: DISCHARGED TO HOME OR SELFCARE


Time spent for discharge: 35 min





Core Measure Documentation





- Palliative Care


Palliative Care/ Comfort Measures: Not Applicable





- Core Measures


Any of the following diagnoses?: none





Exam





- Constitutional


Vitals: 


 











Temp Pulse Resp BP Pulse Ox


 


 98.3 F   77   16   140/83   98 


 


 01/20/17 12:55  01/20/17 12:55  01/20/17 12:55  01/20/17 12:55  01/20/17 08:40














Plan


Activity: advance as tolerated


Diet: low cholesterol, low salt, renal


Follow up with: 


EAMON DALY MD [Primary Care Provider] - 3-5 Days


MARY SUGGS MD [Staff Physician] - 14 Days


PADMINI MACIAS MD [Staff Physician] - 7 Days


Prescriptions: 


Sucralfate [Carafate] 1 gm PO ACHS #90 tablet


Pantoprazole [Protonix] 40 mg PO QDAY #30 tablet

## 2019-12-02 ENCOUNTER — HOSPITAL ENCOUNTER (OUTPATIENT)
Dept: HOSPITAL 5 - ED | Age: 49
Setting detail: OBSERVATION
LOS: 1 days | Discharge: HOME | End: 2019-12-03
Attending: INTERNAL MEDICINE | Admitting: INTERNAL MEDICINE
Payer: MEDICARE

## 2019-12-02 DIAGNOSIS — R74.8: ICD-10-CM

## 2019-12-02 DIAGNOSIS — R07.89: Primary | ICD-10-CM

## 2019-12-02 DIAGNOSIS — N18.6: ICD-10-CM

## 2019-12-02 DIAGNOSIS — D63.1: ICD-10-CM

## 2019-12-02 DIAGNOSIS — Z99.2: ICD-10-CM

## 2019-12-02 DIAGNOSIS — D68.62: ICD-10-CM

## 2019-12-02 DIAGNOSIS — K29.00: ICD-10-CM

## 2019-12-02 DIAGNOSIS — I12.0: ICD-10-CM

## 2019-12-02 LAB
ALBUMIN SERPL-MCNC: 3.9 G/DL (ref 3.9–5)
ALT SERPL-CCNC: 18 UNITS/L (ref 7–56)
BASOPHILS # (AUTO): 0 K/MM3 (ref 0–0.1)
BASOPHILS NFR BLD AUTO: 0.3 % (ref 0–1.8)
BUN SERPL-MCNC: 147 MG/DL (ref 9–20)
BUN/CREAT SERPL: 10 %
CALCIUM SERPL-MCNC: 9.2 MG/DL (ref 8.4–10.2)
EOSINOPHIL # BLD AUTO: 0.2 K/MM3 (ref 0–0.4)
EOSINOPHIL NFR BLD AUTO: 4.1 % (ref 0–4.3)
HCT VFR BLD CALC: 29.7 % (ref 35.5–45.6)
HDLC SERPL-MCNC: 42 MG/DL (ref 40–59)
HEMOLYSIS INDEX: 2
HGB BLD-MCNC: 9.5 GM/DL (ref 11.8–15.2)
INR PPP: 1.14 (ref 0.87–1.13)
LYMPHOCYTES # BLD AUTO: 0.8 K/MM3 (ref 1.2–5.4)
LYMPHOCYTES NFR BLD AUTO: 13.6 % (ref 13.4–35)
MCHC RBC AUTO-ENTMCNC: 32 % (ref 32–34)
MCV RBC AUTO: 89 FL (ref 84–94)
MONOCYTES # (AUTO): 0.5 K/MM3 (ref 0–0.8)
MONOCYTES % (AUTO): 7.9 % (ref 0–7.3)
PLATELET # BLD: 217 K/MM3 (ref 140–440)
RBC # BLD AUTO: 3.35 M/MM3 (ref 3.65–5.03)

## 2019-12-02 PROCEDURE — 94760 N-INVAS EAR/PLS OXIMETRY 1: CPT

## 2019-12-02 PROCEDURE — 83690 ASSAY OF LIPASE: CPT

## 2019-12-02 PROCEDURE — 93010 ELECTROCARDIOGRAM REPORT: CPT

## 2019-12-02 PROCEDURE — 99285 EMERGENCY DEPT VISIT HI MDM: CPT

## 2019-12-02 PROCEDURE — 85025 COMPLETE CBC W/AUTO DIFF WBC: CPT

## 2019-12-02 PROCEDURE — 96374 THER/PROPH/DIAG INJ IV PUSH: CPT

## 2019-12-02 PROCEDURE — 78452 HT MUSCLE IMAGE SPECT MULT: CPT

## 2019-12-02 PROCEDURE — 84484 ASSAY OF TROPONIN QUANT: CPT

## 2019-12-02 PROCEDURE — G0257 UNSCHED DIALYSIS ESRD PT HOS: HCPCS

## 2019-12-02 PROCEDURE — 36415 COLL VENOUS BLD VENIPUNCTURE: CPT

## 2019-12-02 PROCEDURE — 80074 ACUTE HEPATITIS PANEL: CPT

## 2019-12-02 PROCEDURE — C9113 INJ PANTOPRAZOLE SODIUM, VIA: HCPCS

## 2019-12-02 PROCEDURE — A9502 TC99M TETROFOSMIN: HCPCS

## 2019-12-02 PROCEDURE — 96372 THER/PROPH/DIAG INJ SC/IM: CPT

## 2019-12-02 PROCEDURE — 93017 CV STRESS TEST TRACING ONLY: CPT

## 2019-12-02 PROCEDURE — G0378 HOSPITAL OBSERVATION PER HR: HCPCS

## 2019-12-02 PROCEDURE — 93005 ELECTROCARDIOGRAM TRACING: CPT

## 2019-12-02 PROCEDURE — 74176 CT ABD & PELVIS W/O CONTRAST: CPT

## 2019-12-02 PROCEDURE — 71045 X-RAY EXAM CHEST 1 VIEW: CPT

## 2019-12-02 PROCEDURE — 96375 TX/PRO/DX INJ NEW DRUG ADDON: CPT

## 2019-12-02 PROCEDURE — 80053 COMPREHEN METABOLIC PANEL: CPT

## 2019-12-02 PROCEDURE — 85610 PROTHROMBIN TIME: CPT

## 2019-12-02 PROCEDURE — 99284 EMERGENCY DEPT VISIT MOD MDM: CPT

## 2019-12-02 PROCEDURE — 80061 LIPID PANEL: CPT

## 2019-12-02 RX ADMIN — PANTOPRAZOLE SODIUM SCH MG: 40 INJECTION, POWDER, FOR SOLUTION INTRAVENOUS at 21:52

## 2019-12-02 RX ADMIN — NIFEDIPINE SCH MG: 90 TABLET, EXTENDED RELEASE ORAL at 18:04

## 2019-12-02 RX ADMIN — FUROSEMIDE SCH MG: 40 TABLET ORAL at 18:04

## 2019-12-02 RX ADMIN — SUCRALFATE SCH GM: 1 TABLET ORAL at 18:04

## 2019-12-02 RX ADMIN — HEPARIN SODIUM SCH: 5000 INJECTION, SOLUTION INTRAVENOUS; SUBCUTANEOUS at 17:45

## 2019-12-02 RX ADMIN — HEPARIN SODIUM SCH UNIT: 5000 INJECTION, SOLUTION INTRAVENOUS; SUBCUTANEOUS at 21:53

## 2019-12-02 RX ADMIN — SUCRALFATE SCH: 1 TABLET ORAL at 21:55

## 2019-12-02 RX ADMIN — SUCRALFATE SCH: 1 TABLET ORAL at 17:46

## 2019-12-02 RX ADMIN — SUCRALFATE SCH: 1 TABLET ORAL at 09:02

## 2019-12-02 RX ADMIN — PANTOPRAZOLE SODIUM SCH: 40 INJECTION, POWDER, FOR SOLUTION INTRAVENOUS at 17:47

## 2019-12-02 NOTE — EVENT NOTE
Date: 12/02/19





Patient seen and examined


Presented with chest pain and volume overload


Consulted nephrology and cardiology


Patient on coumadin, INR nontherapeutic


cont to monitor trop,f/u cardiology recommendation

## 2019-12-02 NOTE — CAT SCAN REPORT
CT ABDOMEN AND PELVIS WITHOUT CONTRAST



INDICATION / CLINICAL INFORMATION:

NV epigastric pain.



TECHNIQUE:

Axial CT images were obtained through the abdomen and pelvis without IV contrast.  All CT scans at [facility] location are performed using CT dose reduction for ALARA by means of automated exposure control. 



COMPARISON:

None available.



FINDINGS:

LOWER CHEST: Cardiomegaly. Some respiratory motion artifact limits the exam..

LIVER: No significant abnormality.

GALLBLADDER: No significant abnormality.  

BILE DUCTS: No significant abnormality.

PANCREAS: No significant abnormality.

SPLEEN: No significant abnormality.

ADRENALS: No significant abnormality.

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

LEFT KIDNEY and URETER: Atrophic with malrotation.



STOMACH and SMALL BOWEL: No significant abnormality. 

COLON: No significant abnormality. 

APPENDIX: No significant abnormality.  

PERITONEUM: No free fluid. No free air. No fluid collection.

LYMPH NODES: No significant adenopathy.

AORTA and ARTERIES: No significant abnormality. 

IVC and VEINS: No significant abnormality.



URINARY BLADDER: No significant abnormality.

REPRODUCTIVE ORGANS: No significant abnormality.



ADDITIONAL FINDINGS: None.



SKELETAL SYSTEM: No significant abnormality.



IMPRESSION:

No definite acute finding appreciated within the limits of the exam. Extensive respiratory motion art
ifact limits the exam. Mild anasarca.



Signer Name: Musa Silvestre MD 

Signed: 12/2/2019 4:04 AM

 Workstation Name: [id number]

## 2019-12-02 NOTE — CONSULTATION
History of Present Illness





- Reason for Consult


Consult date: 12/02/19


end stage renal disease





- History of Present Illness





Mr. Barker is a 48yo with ESRD on HD and hypertension who presented to the ED 

with chest pain, N/V and SOB.  He reports that he was in usual state of health 

until Thanksgiving day when symptoms began.  He reported burning, epigastric 

pain radiating to chest.  He denies fever, chills.  





Outpatient treatment hx reviewed - he is noncompliant with dialysis.  He last 

dialyzed on Nov 27th but did not complete his prescribed time.  He has not 

completed his full prescribed time since 11/21.  








Past History


Past Medical History: ESRD, hypertension, stroke


Past Surgical History: Other (AV access surgery)


Social history: no significant social history


Family history: no significant family history





Medications and Allergies


                                    Allergies











Allergy/AdvReac Type Severity Reaction Status Date / Time


 


No Known Allergies Allergy   Unverified 01/16/17 08:22











                                Home Medications











 Medication  Instructions  Recorded  Confirmed  Last Taken  Type


 


Furosemide [Lasix TAB] 80 mg PO DAILY 01/16/17 12/02/19 01/15/17 History


 


Minoxidil [Loniten] 2.5 mg PO QDAY 01/16/17 12/02/19 01/15/17 History


 


NIFEdipine XL [Procardia Xl] 90 mg PO QDAY 01/16/17 12/02/19 01/15/17 History


 


Warfarin [Coumadin] 7.5 mg PO QDAY 01/16/17 12/02/19 01/15/17 History


 


carvediloL [Coreg] 25 mg PO QDAY 01/16/17 12/02/19 01/15/17 History


 


cloNIDine-TTS PATCH [Catapres-Tts 1 patch TD Q7D 01/16/17 12/02/19 Unknown Hist

ory





0.3mg Patch]     


 


hydrALAZINE [Apresoline TAB] 50 mg PO Q8HR 01/16/17 12/02/19 01/15/17 History


 


Pantoprazole [Protonix] 40 mg PO QDAY #30 tablet 01/20/17 12/02/19 Unknown Rx


 


Sucralfate [Carafate] 1 gm PO ACHS #90 tablet 01/20/17 12/02/19 Unknown Rx











Active Meds: 


Active Medications





Carvedilol (Coreg)  25 mg PO QDAY [facility]


Clonidine HCl (Catapres-Tts Patch)  0.3 mg TD Mo [facility]


Furosemide (Lasix)  80 mg PO DAILY [facility]


Heparin Sodium (Porcine) (Heparin)  5,000 unit SUB-Q Q12HR [facility]


Hydralazine HCl (Apresoline)  50 mg PO Q8HR [facility]


   Last Admin: 12/02/19 09:02 Dose:  Not Given


   Documented by: 


Minoxidil (Loniten)  2.5 mg PO QDAY [facility]


Nifedipine (Procardia Xl)  90 mg PO QDAY@0800 [facility]


Pantoprazole Sodium (Protonix)  40 mg IV BID [facility]


   Stop: 12/02/19 23:59


Pantoprazole Sodium (Protonix)  40 mg PO BID [facility]


Sucralfate (Carafate)  1 gm PO ACHS [facility]


   Last Admin: 12/02/19 09:02 Dose:  Not Given


   Documented by: 


Warfarin Sodium (Coumadin)  7.5 mg PO QDAY@1700 [facility]; Protocol











Review of Systems


All systems: negative





Exam





- Vital Signs


Vital signs: 


                                   Vital Signs











Pulse Ox


 


 96 


 


 12/02/19 03:27














- General Appearance


General appearance: well-developed, well-nourished


EENT: ATNC


Respiratory: Clear to Ascultation


Heart: regular, S1S2


Gastrointestinal: Present: normal.  Absent: tenderness, distended


Integumentary: no rash, warm and dry


Musculoskeletal: Present: other (no edema)


Psychiatric: cooperative





Results





- Lab Results





                                 12/02/19 03:53





                                 12/02/19 03:53


                             Most recent lab results











Calcium  9.2 mg/dL (8.4-10.2)   12/02/19  03:53    














Assessment and Plan





Impression:


* End stage renal disease


* Chest pain


* Uremia


* Nausea/vomiting - uremia likely contributing factor


* Hypertension


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


* Secondary hyperparathyroidism





Plan:


* Hemodialysis today; continue daily hemodialysis


* UF as tolerated


* Cardiology following; work up in progress


* Epogen TIW prn


* Renal diet


* Dose medications for renal function


* Need for compliance addressed with patient

## 2019-12-02 NOTE — CONSULTATION
History of Present Illness


Consult date: 12/02/19


Consult reason: chest pain


History of present illness: 





The patient is a 49-year-old man with end-stage renal disease on hemodialysis, 

hypertension and chronic anemia.  He presented to the hospital with atypical 

chest pain, and mildly upper abdominal pain, epigastric pain and nausea 

vomiting.  He was evaluated by the medical service, ordered a Lexiscan thallium 

stress test.  The Lexiscan thallium stress test performed today showed normal 

perfusion with mild left ventricular dilatation, no ischemic defects.





The patient has no exertional chest pain, no unusual shortness of breath, no 

palpitations, no edema.  He denies any prior cardiac history.  Serial ECG 

showing normal sinus rhythm with nonspecific ST and T-wave changes.  Chest x-ray

shows a moderate cardiomegaly, with diffuse mild bilateral interstitial 

opacities.  There was a mild isolated rise in troponin of uncertain 

significance.





Of note, this patient is on warfarin therapy, the indication of which is 

uncertain.  There is no report in the chart of a cardiac indication for chronic 

anticoagulation.  His INR was subtherapeutic at 1.14 on presentation.





Past History


Past Medical History: ESRD, hypertension, stroke


Past Surgical History: Other (AV access surgery)


Social history: no significant social history


Family history: no significant family history





Medications and Allergies


                                    Allergies











Allergy/AdvReac Type Severity Reaction Status Date / Time


 


No Known Allergies Allergy   Unverified 01/16/17 08:22











                                Home Medications











 Medication  Instructions  Recorded  Confirmed  Last Taken  Type


 


Furosemide [Lasix TAB] 80 mg PO DAILY 01/16/17 12/02/19 01/15/17 History


 


Minoxidil [Loniten] 2.5 mg PO QDAY 01/16/17 12/02/19 01/15/17 History


 


NIFEdipine XL [Procardia Xl] 90 mg PO QDAY 01/16/17 12/02/19 01/15/17 History


 


Warfarin [Coumadin] 7.5 mg PO QDAY 01/16/17 12/02/19 01/15/17 History


 


carvediloL [Coreg] 25 mg PO QDAY 01/16/17 12/02/19 01/15/17 History


 


cloNIDine-TTS PATCH [Catapres-Tts 1 patch TD Q7D 01/16/17 12/02/19 Unknown 

History





0.3mg Patch]     


 


hydrALAZINE [Apresoline TAB] 50 mg PO Q8HR 01/16/17 12/02/19 01/15/17 History


 


Pantoprazole [Protonix] 40 mg PO QDAY #30 tablet 01/20/17 12/02/19 Unknown Rx


 


Sucralfate [Carafate] 1 gm PO ACHS #90 tablet 01/20/17 12/02/19 Unknown Rx











Active Meds: 


Active Medications





Carvedilol (Coreg)  25 mg PO QDAY [facility]


   Last Admin: 12/02/19 18:03 Dose:  25 mg


   Documented by: 


Clonidine HCl (Catapres-Tts Patch)  0.3 mg TD Mo [facility]


Epoetin Amadeo (Procrit)  10,000 unit IV QUITA PRN


   PRN Reason: hemodialysis


Furosemide (Lasix)  80 mg PO DAILY [facility]


   Last Admin: 12/02/19 18:04 Dose:  80 mg


   Documented by: 


Heparin Sodium (Porcine) (Heparin)  5,000 unit SUB-Q Q12HR [facility]


   Last Admin: 12/02/19 17:45 Dose:  Not Given


   Documented by: 


Hydralazine HCl (Apresoline)  50 mg PO Q8HR [facility]


   Last Admin: 12/02/19 16:16 Dose:  50 mg


   Documented by: 


Sodium Chloride (Nacl 0.9%)  100 mls @ 999 mls/hr IV QUITA PRN


   PRN Reason: Hypotension


Minoxidil (Loniten)  2.5 mg PO QDAY [facility]


   Last Admin: 12/02/19 18:03 Dose:  2.5 mg


   Documented by: 


Nifedipine (Procardia Xl)  90 mg PO QDAY@0800 [facility]


   Last Admin: 12/02/19 18:04 Dose:  90 mg


   Documented by: 


Pantoprazole Sodium (Protonix)  40 mg IV BID [facility]


   Stop: 12/02/19 23:59


   Last Admin: 12/02/19 17:47 Dose:  Not Given


   Documented by: 


Pantoprazole Sodium (Protonix)  40 mg PO BID [facility]


Sucralfate (Carafate)  1 gm PO ACHS [facility]


   Last Admin: 12/02/19 18:04 Dose:  1 gm


   Documented by: 


Warfarin Sodium (Coumadin)  7.5 mg PO QDAY@1700 [facility]; Protocol


   Last Admin: 12/02/19 18:02 Dose:  7.5 mg


   Documented by: 











Review of Systems


Cardiovascular: chest pain, no orthopnea, no palpitations, no rapid/irregular 

heart beat, no edema, no syncope, no lightheadedness, no shortness of breath





Physical Examination


                                   Vital Signs











Pulse Ox


 


 96 


 


 12/02/19 03:27











General appearance: no acute distress


HEENT: Positive: PERRL


Neck: Positive: neck supple


Cardiac: Positive: Reg Rate and Rhythm


Lungs: Positive: Decreased Breath Sounds


Neuro: Positive: Weakness


Abdomen: Positive: Soft


Male genitourinary: Positive: deferred


Skin: Positive: Clear


Extremities: Absent: edema





Results





                                 12/02/19 03:53





                                 12/02/19 03:53


                                 Cardiac Enzymes











  12/02/19 Range/Units





  03:53 


 


AST  18  (5-40)  units/L








                                   Coagulation











  12/02/19 Range/Units





  09:49 


 


PT  14.5  (12.2-14.9)  Sec.


 


INR  1.14 H  (0.87-1.13)  








                                     Lipids











  12/02/19 Range/Units





  03:53 


 


Triglycerides  105  (2-149)  mg/dL


 


Cholesterol  122  ()  mg/dL


 


HDL Cholesterol  42  (40-59)  mg/dL


 


Cholesterol/HDL Ratio  2.90  %








                                       CBC











  12/02/19 Range/Units





  03:53 


 


WBC  6.0  (4.5-11.0)  K/mm3


 


RBC  3.35 L  (3.65-5.03)  M/mm3


 


Hgb  9.5 L  (11.8-15.2)  gm/dl


 


Hct  29.7 L  (35.5-45.6)  %


 


Plt Count  217  (140-440)  K/mm3


 


Lymph #  0.8 L  (1.2-5.4)  K/mm3


 


Mono #  0.5  (0.0-0.8)  K/mm3


 


Eos #  0.2  (0.0-0.4)  K/mm3


 


Baso #  0.0  (0.0-0.1)  K/mm3








                          Comprehensive Metabolic Panel











  12/02/19 Range/Units





  03:53 


 


Sodium  136 L  (137-145)  mmol/L


 


Potassium  4.0  (3.6-5.0)  mmol/L


 


Chloride  88.2 L  ()  mmol/L


 


Carbon Dioxide  22  (22-30)  mmol/L


 


BUN  147 H  (9-20)  mg/dL


 


Creatinine  14.6 H  (0.8-1.5)  mg/dL


 


Glucose  151 H  ()  mg/dL


 


Calcium  9.2  (8.4-10.2)  mg/dL


 


AST  18  (5-40)  units/L


 


ALT  18  (7-56)  units/L


 


Alkaline Phosphatase  109  ()  units/L


 


Total Protein  6.6  (6.3-8.2)  g/dL


 


Albumin  3.9  (3.9-5)  g/dL














EKG interpretations





- Telemetry


EKG Rhythm: Sinus Rhythm





Assessment and Plan





- Patient Problems


(1) Atypical chest pain


Current Visit: Yes   Status: Acute   


Plan to address problem: 


Atypical chest pain which appears more consistent with gastroesophageal reflux 

or gastritis.  He underwent a Lexiscan thallium stress test today, normal 

perfusion, no ischemic defects.








(2) Cardiomegaly


Current Visit: Yes   Status: Acute   


Plan to address problem: 


Cardiomegaly on chest x-ray, we will order an echocardiogram for left 

ventricular function assessment.








(3) Abnormal chest x-ray


Current Visit: Yes   Status: Acute   


Plan to address problem: 


Abnormal chest x-ray with bilateral interstitial opacities, does not appear to 

represent pulmonary edema, recommend pulmonary consultation and further evaluat

ion.

## 2019-12-02 NOTE — HISTORY AND PHYSICAL REPORT
CHIEF COMPLAINT:

1.  Abdominal pain.

2.  Nausea, vomiting of 1 day duration.

3.  Chest pain.



HISTORY OF PRESENT ILLNESS:  A 49-year-old. The patient vomited about 3-4 times.

 Chest pain is retrosternal, nonradiating.  Pain is about 5 on a scale of 1-10. 

Some shortness of breath present.  No diarrhea.  No fever or chills.  No recent

travel.



PAST MEDICAL HISTORY:  Significant for hypertension, end-stage renal disease,

and anticoagulation.



PAST SURGICAL HISTORY:  Left AV graft.



SOCIAL HISTORY:  Does not smoke.



FAMILY HISTORY:  Hypertension.



CURRENT MEDICATIONS:  Include nifedipine 90 mg once a day, Coumadin 7.5 once a

day and clonidine TTS patch q. weekly, hydralazine 50 mg every 8 hours, and

Protonix 40 mg once a day.



REVIEW OF SYSTEMS:  Significant for chest pain, shortness of breath, and nausea

and vomiting.  Otherwise, review of systems is negative.



PHYSICAL EXAMINATION:

GENERAL:  Middle-aged man, cooperative during examination.

VITAL SIGNS:  Blood pressure is 113/71, temperature is 98, pulse is 84,

respirations are 14, and sats are 92%.

HEENT:  Unremarkable.  Pupils are equal and reactive.

NECK:  Supple, no lymphadenopathy, no thyromegaly.

LUNGS:  Clear to auscultation and percussion.  Good air entry.

CARDIOVASCULAR:  S1, S2 heard.  No gallop, no murmur, no rub.  Apical impulse in

left fifth intercostal space and midclavicular line.

ABDOMEN:  Soft.  Slight tenderness in the epigastric region.  Bowel sounds are

normal.  Hernial orifices are normal.

EXTREMITIES:  Good pedal pulses.  No pedal edema.

CENTRAL NERVOUS SYSTEM:  Alert and oriented x 4, nonfocal exam.

SKIN:  Normal.



LABORATORY DATA:  Significant for white count of 6000, H and H is 9.5 and 29.7,

platelet count is 217,000.  Sodium is 136, potassium is 4.0, BUN and creatinine

is 147 and 14.6.  AST and ALT are normal.  Troponin is 0.077.  Urine normal. 

EKG reviewed.  No acute ST-T wave changes.  Chest x-ray shows extensive

consolidation present throughout the both lungs.  Chest x-ray shows extensive

consolidation present throughout both lungs.  Abdominal CAT scan shows no

definite acute findings appreciated.  Extensive respiratory motion artifact,

limited exam, mild anasarca.



ASSESSMENT AND PLAN:

1.  Chest pain, rule out myocardial infarction, chest pain protocol.  Serial

troponins and Lexiscan.

2.  Hypertension.  Continue antihypertensives and control the blood pressure. 

Now, the blood pressure is normal.

3.  Acute gastritis.  IV Protonix and gentle IV hydration.

4.  Anticoagulation.  Continue Coumadin.

5.  Anemia secondary to chronic kidney disease.

6.  Elevated troponin consistent with non-STEMI 2.  The patient will also order

for a stress test.

7.  Deep venous thrombosis prophylaxis, heparin 5000 q. 12.





DD: 12/02/2019

DT: 12/02/2019 

JOB# [id number]  [id number]

AKILA/JAZMIN CALDERON

## 2019-12-02 NOTE — EMERGENCY DEPARTMENT REPORT
ED General Adult HPI





- General


Chief complaint: Abdominal Pain


Stated complaint: ABDOMINAL PAIN


Time Seen by Provider: 12/02/19 03:26


Source: EMS


Mode of arrival: Stretcher


Limitations: No Limitations





- History of Present Illness


Initial comments: 





Patient is a 49-year-old  male with a past medical history of 

end-stage renal disease and hypertension who is presenting with nausea vomiting 

shortness of breath chest pain.  Patient states symptoms started on Thanksgiving

day approximate 4 days ago.  Patient states he hardly ate that day because of a 

burning epigastric pain radiating into his chest.  He said multiple episodes of 

nausea and vomiting.  Patient states he had dialysis on Saturday however he's 

continued to have chest pain and denies shortness of breath as well.  Patient 

denies fevers chills cough cold or congestion.





- Related Data


                                Home Medications











 Medication  Instructions  Recorded  Confirmed  Last Taken


 


Furosemide [Lasix TAB] 80 mg PO 4XW 01/16/17 01/16/17 01/15/17


 


Minoxidil [Loniten] 2.5 mg PO QDAY 01/16/17 01/16/17 01/15/17


 


NIFEdipine XL [Procardia Xl] 90 mg PO QDAY 01/16/17 01/16/17 01/15/17


 


Warfarin [Coumadin] 7.5 mg PO QDAY 01/16/17 01/16/17 01/15/17


 


carvediloL [Coreg] 25 mg PO QDAY 01/16/17 01/16/17 01/15/17


 


cloNIDine-TTS PATCH [Catapres-Tts 1 patch TD Q7D 01/16/17 01/16/17 Unknown





0.3mg Patch]    


 


hydrALAZINE [Apresoline TAB] 50 mg PO Q8HR 01/16/17 01/16/17 01/15/17








                                  Previous Rx's











 Medication  Instructions  Recorded  Last Taken  Type


 


Pantoprazole [Protonix] 40 mg PO QDAY #30 tablet 01/20/17 Unknown Rx


 


Sucralfate [Carafate] 1 gm PO ACHS #90 tablet 01/20/17 Unknown Rx











                                    Allergies











Allergy/AdvReac Type Severity Reaction Status Date / Time


 


No Known Allergies Allergy   Unverified 01/16/17 08:22














ED Review of Systems


ROS: 


Stated complaint: ABDOMINAL PAIN


Other details as noted in HPI





Comment: All other systems reviewed and negative





ED Past Medical Hx





- Past Medical History


Previous Medical History?: Yes


Hx Hypertension: Yes


Hx Renal Disease: Yes (HD TThS)





- Surgical History


Past Surgical History?: Yes


Additional Surgical History: Left A/V Graft





- Social History


Smoking Status: Never Smoker





- Medications


Home Medications: 


                                Home Medications











 Medication  Instructions  Recorded  Confirmed  Last Taken  Type


 


Furosemide [Lasix TAB] 80 mg PO 4XW 01/16/17 01/16/17 01/15/17 History


 


Minoxidil [Loniten] 2.5 mg PO QDAY 01/16/17 01/16/17 01/15/17 History


 


NIFEdipine XL [Procardia Xl] 90 mg PO QDAY 01/16/17 01/16/17 01/15/17 History


 


Warfarin [Coumadin] 7.5 mg PO QDAY 01/16/17 01/16/17 01/15/17 History


 


carvediloL [Coreg] 25 mg PO QDAY 01/16/17 01/16/17 01/15/17 History


 


cloNIDine-TTS PATCH [Catapres-Tts 1 patch TD Q7D 01/16/17 01/16/17 Unknown Histo

ry





0.3mg Patch]     


 


hydrALAZINE [Apresoline TAB] 50 mg PO Q8HR 01/16/17 01/16/17 01/15/17 History


 


Pantoprazole [Protonix] 40 mg PO QDAY #30 tablet 01/20/17  Unknown Rx


 


Sucralfate [Carafate] 1 gm PO ACHS #90 tablet 01/20/17  Unknown Rx














ED Physical Exam





- General


Limitations: No Limitations


General appearance: alert, in no apparent distress





- Head


Head exam: Present: atraumatic, normocephalic





- Eye


Eye exam: Present: normal appearance, PERRL, EOMI





- ENT


ENT exam: Present: mucous membranes moist





- Neck


Neck exam: Present: normal inspection





- Respiratory


Respiratory exam: Present: rales.  Absent: respiratory distress, wheezes, 

rhonchi





- Cardiovascular


Cardiovascular Exam: Present: regular rate, normal rhythm, normal heart sounds. 

 Absent: systolic murmur, diastolic murmur, rubs, gallop





- GI/Abdominal


GI/Abdominal exam: Present: soft, tenderness (epigastric), normal bowel sounds. 

 Absent: distended, guarding, rebound





- Rectal


Rectal exam: Present: deferred





- Extremities Exam


Extremities exam: Present: normal inspection





- Back Exam


Back exam: Present: normal inspection





- Neurological Exam


Neurological exam: Present: alert, oriented X3





- Psychiatric


Psychiatric exam: Present: normal affect, normal mood





- Skin


Skin exam: Present: warm, dry, intact, normal color.  Absent: rash





ED Course





                                   Vital Signs











  12/02/19





  03:36


 


Temperature 98 F


 


Pulse Rate 84


 


Respiratory 14





Rate 


 


Blood Pressure 126/75





[Right] 


 


O2 Sat by Pulse 96





Oximetry 














ED Medical Decision Making





- Lab Data


Result diagrams: 


                                 12/02/19 03:53





                                 12/02/19 03:53








                                   Lab Results











  12/02/19 12/02/19 12/02/19 Range/Units





  03:53 03:53 03:53 


 


WBC  6.0    (4.5-11.0)  K/mm3


 


RBC  3.35 L    (3.65-5.03)  M/mm3


 


Hgb  9.5 L    (11.8-15.2)  gm/dl


 


Hct  29.7 L    (35.5-45.6)  %


 


MCV  89    (84-94)  fl


 


MCH  28    (28-32)  pg


 


MCHC  32    (32-34)  %


 


RDW  18.4 H    (13.2-15.2)  %


 


Plt Count  217    (140-440)  K/mm3


 


Lymph % (Auto)  13.6    (13.4-35.0)  %


 


Mono % (Auto)  7.9 H    (0.0-7.3)  %


 


Eos % (Auto)  4.1    (0.0-4.3)  %


 


Baso % (Auto)  0.3    (0.0-1.8)  %


 


Lymph #  0.8 L    (1.2-5.4)  K/mm3


 


Mono #  0.5    (0.0-0.8)  K/mm3


 


Eos #  0.2    (0.0-0.4)  K/mm3


 


Baso #  0.0    (0.0-0.1)  K/mm3


 


Seg Neutrophils %  74.1 H    (40.0-70.0)  %


 


Seg Neutrophils #  4.5    (1.8-7.7)  K/mm3


 


Sodium   136 L   (137-145)  mmol/L


 


Potassium   4.0   (3.6-5.0)  mmol/L


 


Chloride   88.2 L   ()  mmol/L


 


Carbon Dioxide   22   (22-30)  mmol/L


 


Anion Gap   30   mmol/L


 


BUN   147 H   (9-20)  mg/dL


 


Creatinine   14.6 H   (0.8-1.5)  mg/dL


 


Estimated GFR   4   ml/min


 


BUN/Creatinine Ratio   10   %


 


Glucose   151 H   ()  mg/dL


 


Calcium   9.2   (8.4-10.2)  mg/dL


 


Total Bilirubin   0.40   (0.1-1.2)  mg/dL


 


AST   18   (5-40)  units/L


 


ALT   18   (7-56)  units/L


 


Alkaline Phosphatase   109   ()  units/L


 


Troponin T    0.077 H  (0.00-0.029)  ng/mL


 


Total Protein   6.6   (6.3-8.2)  g/dL


 


Albumin   3.9   (3.9-5)  g/dL


 


Albumin/Globulin Ratio   1.4   %


 


Triglycerides    105  (2-149)  mg/dL


 


Cholesterol    122  ()  mg/dL


 


LDL Cholesterol Direct    66  ()  mg/dL


 


HDL Cholesterol    42  (40-59)  mg/dL


 


Cholesterol/HDL Ratio    2.90  %


 


Lipase   18   (13-60)  units/L














- EKG Data


-: EKG Interpreted by Me


EKG shows normal: sinus rhythm, axis, intervals, QRS complexes, ST-T waves


Rate: normal





- EKG Data


Interpretation: normal EKG





- Radiology Data


CHEST 1 VIEW 





 INDICATION / CLINICAL INFORMATION: 


 chest pain. 





 COMPARISON: 


 None available. 





 FINDINGS: 





 SUPPORT DEVICES: None. 





 HEART / MEDIASTINUM: Mildly enlarged 





 LUNGS / PLEURA: Extensive consolidation present throughout both lungs. 





 Signer Name: Musa Silvestre MD 


 Signed: 12/2/2019 4:03 AM 


 Workstation Name: [id number] 











CT ABDOMEN AND PELVIS WITHOUT CONTRAST 





 INDICATION / CLINICAL INFORMATION: 


 NV epigastric pain. 





 TECHNIQUE: 


 Axial CT images were obtained through the abdomen and pelvis without IV 

contrast. All CT scans at 


 this location are performed using CT dose reduction for ALARA by means of 

automated exposure 


 control. 





 COMPARISON: 


 None available. 





 FINDINGS: 


 LOWER CHEST: Cardiomegaly. Some respiratory motion artifact limits the exam.. 


 LIVER: No significant abnormality. 


 GALLBLADDER: No significant abnormality. 


 BILE DUCTS: No significant abnormality. 


 PANCREAS: No significant abnormality. 


 SPLEEN: No significant abnormality. 


 ADRENALS: No significant abnormality. 


 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 


 LEFT KIDNEY and URETER: Atrophic with malrotation. 





 STOMACH and SMALL BOWEL: No significant abnormality. 


 COLON: No significant abnormality. 


 APPENDIX: No significant abnormality. 


 PERITONEUM: No free fluid. No free air. No fluid collection. 


 LYMPH NODES: No significant adenopathy. 


 AORTA and ARTERIES: No significant abnormality. 


 IVC and VEINS: No significant abnormality. 





 URINARY BLADDER: No significant abnormality. 


 REPRODUCTIVE ORGANS: No significant abnormality. 





 ADDITIONAL FINDINGS: None. 





 SKELETAL SYSTEM: No significant abnormality. 





 IMPRESSION: 


 No definite acute finding appreciated within the limits of the exam. Extensive 

respiratory motion 


 artifact limits the exam. Mild anasarca. 





 Signer Name: Musa Silvestre MD 





- Medical Decision Making





Patient is a 49-year-old American male who is presenting with 2 issues.  Patient

 was a patient has end stage renal disease and is complaining of shortness of 

breath.  Chest x-ray shows pulmonary edema.  Patient's while resting in his room

 had a O2 sat of 88%.  Patient was started on oxygen therapy.  The patient does 

appear to need dialysis and likely would not make it till Tuesday.  Secondly the

 patient has had nausea and vomiting and epigastric discomfort.  CT of abdomen 

and pelvis shows no acute process and his laboratory studies do not show with 

patient has pancreatitis or biliary issues.  Patient likely with a gastritis.  

Patient was given antiemetics and medications diseases epigastric burning.  

Patient's symptoms did improve and he was able to rest comfortably.  Patient 

will be admitted to the hospitalist service for dialysis.


Critical care attestation.: 


If time is entered above; I have spent that time in minutes in the direct care 

of this critically ill patient, excluding procedure time.








ED Disposition


Clinical Impression: 


 End-stage renal disease needing dialysis





Pulmonary edema


Qualifiers:


 Chronicity: acute Qualified Code(s): J81.0 - Acute pulmonary edema





Chest pain


Qualifiers:


 Chest pain type: unspecified Qualified Code(s): R07.9 - Chest pain, unspecified





Gastritis


Qualifiers:


 Gastritis type: unspecified gastritis Chronicity: acute Gastritis bleeding: 

without bleeding Qualified Code(s): K29.00 - Acute gastritis without bleeding





Disposition: DC-09 OP ADMIT IP TO THIS HOSP


Is pt being admited?: Yes


Does the pt Need Aspirin: No


Condition: Stable


Instructions:  Pulmonary Edema (ED), Chest Pain (ED)


Time of Disposition: 05:13

## 2019-12-02 NOTE — EVENT NOTE
Date: 12/02/19


See history and physical and the reports


End-stage renal disease needing dialysis


Volume overload

## 2019-12-02 NOTE — XRAY REPORT
CHEST 1 VIEW 



INDICATION / CLINICAL INFORMATION:

chest pain.



COMPARISON: 

None available.



FINDINGS:



SUPPORT DEVICES: None.



HEART / MEDIASTINUM: Mildly enlarged



LUNGS / PLEURA: Extensive consolidation present throughout both lungs.



Signer Name: Musa Silvestre MD 

Signed: 12/2/2019 4:03 AM

 Workstation Name: [id number]

## 2019-12-02 NOTE — TREADMILL REPORT
THALLIUM STRESS TEST REPORT 



LEFT VENTRICLE:  Left ventricle is at the upper limits of normal in size. 

Perfusion study demonstrates homogeneous uptake of the tracer in all segments,

no significant defects identified.  Gated analysis demonstrates normal left

ventricular systolic function, ejection fraction 52%.



CONCLUSION:  Normal myocardial perfusion study.





DD: 12/02/2019 14:13

DT: 12/02/2019 21:30

JOB# [id number]  [id number]

CA/NTS

## 2019-12-03 VITALS — DIASTOLIC BLOOD PRESSURE: 88 MMHG | SYSTOLIC BLOOD PRESSURE: 150 MMHG

## 2019-12-03 LAB — INR PPP: 1.05 (ref 0.87–1.13)

## 2019-12-03 RX ADMIN — SUCRALFATE SCH GM: 1 TABLET ORAL at 14:15

## 2019-12-03 RX ADMIN — HEPARIN SODIUM SCH: 5000 INJECTION, SOLUTION INTRAVENOUS; SUBCUTANEOUS at 14:19

## 2019-12-03 RX ADMIN — SUCRALFATE SCH GM: 1 TABLET ORAL at 08:30

## 2019-12-03 RX ADMIN — FUROSEMIDE SCH MG: 40 TABLET ORAL at 14:16

## 2019-12-03 RX ADMIN — NIFEDIPINE SCH MG: 90 TABLET, EXTENDED RELEASE ORAL at 08:30

## 2019-12-03 NOTE — DISCHARGE SUMMARY
Providers





- Providers


Date of Admission: 


12/02/19 06:25





Date of discharge: 12/03/19


Attending physician: 


RAJEEV NIEVES





                                        





12/02/19 07:05


Consult to Physician [CONS] Routine 


   Comment: 


   Consulting Provider: PADMINI MACIAS


   Physician Instructions: 


   Reason For Exam: ESRD





12/02/19 12:36


Consult to Physician [CONS] Routine 


   Comment: 


   Consulting Provider: VINCENT GARCIA


   Physician Instructions: 


   Reason For Exam: chest pain











Primary care physician: 


PRIMARY CARE MD








Hospitalization


Condition: Stable


Pertinent studies: 





Exercise stress test


Abdomen/pelvis CT


CXR








Hospital course: 





Discharge diagnosis:


* End stage renal disease on HD


* Chest pain due to CAP placed on abx, stress test was negative


* Uremia due to ESRD


* Nausea/vomiting - uremia likely contributing factor


* Hypertension


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


* Secondary hyperparathyroidism


* Paroxysmal atrial fib on coumadin





Disposition: DC-01 TO HOME OR SELFCARE


Time spent for discharge: 34 minutes





Core Measure Documentation





- Palliative Care


Palliative Care/ Comfort Measures: Not Applicable





- Core Measures


Any of the following diagnoses?: none





Exam





- Constitutional


Vitals: 


                                        











Temp Pulse Resp BP Pulse Ox


 


 98.2 F   90   18   155/90   97 


 


 12/03/19 09:10  12/03/19 10:30  12/03/19 09:10  12/03/19 10:30  12/03/19 07:53











General appearance: Present: no acute distress, well-nourished





- EENT


Eyes: Present: PERRL


ENT: hearing intact, clear oral mucosa





- Neck


Neck: Present: supple, normal ROM





- Respiratory


Respiratory effort: normal


Respiratory: bilateral: CTA





- Cardiovascular


Heart Sounds: Present: S1 & S2.  Absent: rub, click





- Extremities


Extremities: pulses symmetrical, No edema


Peripheral Pulses: within normal limits





- Abdominal


General gastrointestinal: Present: soft, non-tender, non-distended, normal bowel

 sounds





- Integumentary


Integumentary: Present: clear, warm, dry





- Musculoskeletal


Musculoskeletal: gait normal, strength equal bilaterally





- Psychiatric


Psychiatric: appropriate mood/affect, intact judgment & insight





- Neurologic


Neurologic: CNII-XII intact, moves all extremities





Plan


Activity: advance as tolerated


Weight Bearing Status: Weight Bear as Tolerated


Diet: renal


Special Instructions: restrict fluid intake to (1.2 L per day), home health RN 

(for INR check)


Follow up with: 


PRIMARY CARE,MD [Primary Care Provider] - 7 Days


JOSE,CHITURU, MD [Staff Physician] - 7 Days


Forms:  Warfarin Discharge Instruction


Prescriptions: 


Amoxicillin/K Clav Tab [Augmentin 875MG TAB] 1 each PO Q12HR #14 tablet


Amiodarone [Cordarone 200 MG TAB] 200 mg PO QDAY #30 tablet


Warfarin [Coumadin] 7.5 mg PO QDAY #14

## 2019-12-03 NOTE — PROGRESS NOTE
Assessment and Plan





Atypical chest pain


   stress thallium test this admission: normal perfusion with mild left 

ventricular dilatation, no ischemic defects.


End-stage renal disease on hemodialysis


Hypertension 


Chronic anemia  


Mild isolated rise in troponin of uncertain significance


Paroxysmal Afib


   on warfarin. INR sub-therapeutic on presentation





Recommendations:


Continue warfarin for oral anticoagulation. In addition to carvedilol, we will 

add amiodarone for suppression of paroxysmal atrial fibrillation.





Subjective


Date of service: 12/03/19


Interval history: 





Patient was seen in dialysis. He denies chest pain.





Objective


                                   Vital Signs











  Temp Pulse Resp Resp BP Pulse Ox


 


 12/03/19 10:30   90    155/90 


 


 12/03/19 10:15   80    148/93 


 


 12/03/19 10:00   90    137/81 


 


 12/03/19 09:45   89    144/87 


 


 12/03/19 09:30   88    140/84 


 


 12/03/19 09:15   88    135/81 


 


 12/03/19 09:10  98.2 F  87  18   138/82 


 


 12/03/19 07:53       97


 


 12/03/19 05:32  97.8 F  80  20   118/58  91


 


 12/02/19 22:00     18  


 


 12/02/19 21:37       97


 


 12/02/19 21:14  98.2 F  134 H  18   182/110  98


 


 12/02/19 19:06  98.8 F  126 H  18   157/104  94


 


 12/02/19 18:03   115 H    195/116 


 


 12/02/19 16:45   97 H    179/115 


 


 12/02/19 16:39  98.6 F  102 H  18   162/95 


 


 12/02/19 16:31   84    196/116 


 


 12/02/19 16:16   96 H    188/144 


 


 12/02/19 16:15   96 H    188/114 


 


 12/02/19 16:00   69    186/100 


 


 12/02/19 15:45   75    173/82 


 


 12/02/19 15:30   86    174/96 


 


 12/02/19 15:15   94 H    180/93 


 


 12/02/19 15:00   90    157/81 


 


 12/02/19 14:45   95 H    174/96 


 


 12/02/19 14:30   87    172/94 


 


 12/02/19 14:15   88    175/92 


 


 12/02/19 14:00   88    178/96 


 


 12/02/19 13:45   84    175/91 


 


 12/02/19 13:30   84    159/88 


 


 12/02/19 13:25   84    158/88 


 


 12/02/19 13:20  97.7 F  84  19   155/83 














- Physical Examination


General: No Apparent Distress


HEENT: Positive: PERRL


Neck: Positive: neck supple


Cardiac: Positive: Reg Rate and Rhythm


Lungs: Positive: Decreased Breath Sounds


Neuro: Positive: Weakness


Extremities: Absent: edema





- Labs and Meds


                                   Coagulation











  12/03/19 Range/Units





  05:15 


 


PT  13.6  (12.2-14.9)  Sec.


 


INR  1.05  (0.87-1.13)

## 2019-12-07 ENCOUNTER — HOSPITAL ENCOUNTER (INPATIENT)
Dept: HOSPITAL 5 - ED | Age: 49
LOS: 2 days | Discharge: HOME | DRG: 391 | End: 2019-12-09
Attending: INTERNAL MEDICINE | Admitting: INTERNAL MEDICINE
Payer: MEDICARE

## 2019-12-07 DIAGNOSIS — Z91.19: ICD-10-CM

## 2019-12-07 DIAGNOSIS — I48.0: ICD-10-CM

## 2019-12-07 DIAGNOSIS — I12.0: ICD-10-CM

## 2019-12-07 DIAGNOSIS — K29.70: ICD-10-CM

## 2019-12-07 DIAGNOSIS — Z79.899: ICD-10-CM

## 2019-12-07 DIAGNOSIS — K29.80: Primary | ICD-10-CM

## 2019-12-07 DIAGNOSIS — K21.0: ICD-10-CM

## 2019-12-07 DIAGNOSIS — N25.81: ICD-10-CM

## 2019-12-07 DIAGNOSIS — I16.0: ICD-10-CM

## 2019-12-07 DIAGNOSIS — Z79.01: ICD-10-CM

## 2019-12-07 DIAGNOSIS — Z82.49: ICD-10-CM

## 2019-12-07 DIAGNOSIS — Z99.2: ICD-10-CM

## 2019-12-07 DIAGNOSIS — K25.9: ICD-10-CM

## 2019-12-07 DIAGNOSIS — D63.1: ICD-10-CM

## 2019-12-07 DIAGNOSIS — N18.6: ICD-10-CM

## 2019-12-07 LAB
ALBUMIN SERPL-MCNC: 3.4 G/DL (ref 3.9–5)
ALT SERPL-CCNC: 21 UNITS/L (ref 7–56)
APTT BLD: 30.9 SEC. (ref 24.2–36.6)
BASOPHILS # (AUTO): 0.1 K/MM3 (ref 0–0.1)
BASOPHILS NFR BLD AUTO: 0.8 % (ref 0–1.8)
BILIRUB DIRECT SERPL-MCNC: < 0.2 MG/DL (ref 0–0.2)
BUN SERPL-MCNC: 86 MG/DL (ref 9–20)
BUN/CREAT SERPL: 6 %
CALCIUM SERPL-MCNC: 9 MG/DL (ref 8.4–10.2)
EOSINOPHIL # BLD AUTO: 0.3 K/MM3 (ref 0–0.4)
EOSINOPHIL NFR BLD AUTO: 5.2 % (ref 0–4.3)
HCT VFR BLD CALC: 24.9 % (ref 35.5–45.6)
HCT VFR BLD CALC: 27.7 % (ref 35.5–45.6)
HEMOLYSIS INDEX: 5
HGB BLD-MCNC: 8 GM/DL (ref 11.8–15.2)
HGB BLD-MCNC: 9 GM/DL (ref 11.8–15.2)
INR PPP: 1.13 (ref 0.87–1.13)
LYMPHOCYTES # BLD AUTO: 1.3 K/MM3 (ref 1.2–5.4)
LYMPHOCYTES NFR BLD AUTO: 19.3 % (ref 13.4–35)
MCHC RBC AUTO-ENTMCNC: 32 % (ref 32–34)
MCV RBC AUTO: 89 FL (ref 84–94)
MONOCYTES # (AUTO): 0.5 K/MM3 (ref 0–0.8)
MONOCYTES % (AUTO): 7.5 % (ref 0–7.3)
PLATELET # BLD: 253 K/MM3 (ref 140–440)
RBC # BLD AUTO: 2.79 M/MM3 (ref 3.65–5.03)

## 2019-12-07 PROCEDURE — 85730 THROMBOPLASTIN TIME PARTIAL: CPT

## 2019-12-07 PROCEDURE — 87116 MYCOBACTERIA CULTURE: CPT

## 2019-12-07 PROCEDURE — 96374 THER/PROPH/DIAG INJ IV PUSH: CPT

## 2019-12-07 PROCEDURE — 88312 SPECIAL STAINS GROUP 1: CPT

## 2019-12-07 PROCEDURE — 85014 HEMATOCRIT: CPT

## 2019-12-07 PROCEDURE — 85610 PROTHROMBIN TIME: CPT

## 2019-12-07 PROCEDURE — 85018 HEMOGLOBIN: CPT

## 2019-12-07 PROCEDURE — 88305 TISSUE EXAM BY PATHOLOGIST: CPT

## 2019-12-07 PROCEDURE — 85025 COMPLETE CBC W/AUTO DIFF WBC: CPT

## 2019-12-07 PROCEDURE — 88342 IMHCHEM/IMCYTCHM 1ST ANTB: CPT

## 2019-12-07 PROCEDURE — 74022 RADEX COMPL AQT ABD SERIES: CPT

## 2019-12-07 PROCEDURE — 36415 COLL VENOUS BLD VENIPUNCTURE: CPT

## 2019-12-07 PROCEDURE — 85027 COMPLETE CBC AUTOMATED: CPT

## 2019-12-07 PROCEDURE — 83690 ASSAY OF LIPASE: CPT

## 2019-12-07 PROCEDURE — 80076 HEPATIC FUNCTION PANEL: CPT

## 2019-12-07 PROCEDURE — 80048 BASIC METABOLIC PNL TOTAL CA: CPT

## 2019-12-07 PROCEDURE — 96375 TX/PRO/DX INJ NEW DRUG ADDON: CPT

## 2019-12-07 PROCEDURE — 5A1D70Z PERFORMANCE OF URINARY FILTRATION, INTERMITTENT, LESS THAN 6 HOURS PER DAY: ICD-10-PCS | Performed by: INTERNAL MEDICINE

## 2019-12-07 RX ADMIN — NIFEDIPINE SCH MG: 90 TABLET, EXTENDED RELEASE ORAL at 13:02

## 2019-12-07 RX ADMIN — SUCRALFATE SCH GM: 1 TABLET ORAL at 21:24

## 2019-12-07 RX ADMIN — MINOXIDIL SCH MG: 2.5 TABLET ORAL at 13:03

## 2019-12-07 RX ADMIN — SUCRALFATE SCH GM: 1 TABLET ORAL at 17:45

## 2019-12-07 RX ADMIN — AMIODARONE HYDROCHLORIDE SCH MG: 200 TABLET ORAL at 13:02

## 2019-12-07 NOTE — GASTROENTEROLOGY CONSULTATION
History of Present Illness





- Reason for Consult


Consult date: 12/07/19


abdominal pain, nausea/vomiting, anemia


Requesting physician: BEAN MAZARIEGOS





- History of Present Illness





This is a 50 yo male with pmh of ESRD on HD, HTN, chronic anemia, p Afib on 

coumadin (last dose on Tuesday per patient during last admission) admitted for 

abdominal pain/nausea/vomiting. He reports having abdominal pain and 

nausea/vomiting for the past 1 month or so. He also has had loose stools and 

intermittent black stools. no prior GI evaluation. he has been on PPI and 

sucralfate without much relief. 


He was noted to have down trend in H/H with acute on chronic anemia. hgb down to

8 from 9 at last admission. He was on coumadin for afib but did not take it at 

home since last admission with discharge on Tuesday. INR at 1.13. 


During last admission, he had negative stress test and cardiac work up. 





Medication list reviewed. 





Past History


Past Medical History: atrial fib, anemia, ESRD


Past Surgical History: Other (fistula)


Family history: diabetes, hypertension





Medications and Allergies


                                    Allergies











Allergy/AdvReac Type Severity Reaction Status Date / Time


 


No Known Allergies Allergy   Unverified 01/16/17 08:22











                                Home Medications











 Medication  Instructions  Recorded  Confirmed  Last Taken  Type


 


Furosemide [Lasix TAB] 80 mg PO DAILY 01/16/17 12/02/19 01/15/17 History


 


Minoxidil [Loniten] 2.5 mg PO QDAY 01/16/17 12/02/19 01/15/17 History


 


NIFEdipine XL [Procardia Xl] 90 mg PO QDAY 01/16/17 12/02/19 01/15/17 History


 


carvediloL [Coreg] 25 mg PO QDAY 01/16/17 12/02/19 01/15/17 History


 


cloNIDine-TTS PATCH [Catapres-Tts 1 patch TD Q7D 01/16/17 12/02/19 Unknown 

History





0.3mg Patch]     


 


hydrALAZINE [Apresoline TAB] 50 mg PO Q8HR 01/16/17 12/02/19 01/15/17 History


 


Pantoprazole [Protonix TAB] 40 mg PO QDAY #30 tablet 01/20/17 12/02/19 Unknown 

Rx


 


Sucralfate [Carafate] 1 gm PO ACHS #90 tablet 01/20/17 12/02/19 Unknown Rx


 


Amiodarone [Cordarone 200 MG TAB] 200 mg PO QDAY #30 tablet 12/03/19  Unknown Rx


 


Amoxicillin/K Clav Tab [Augmentin 1 each PO Q12HR #14 tablet 12/03/19  Unknown 

Rx





875MG TAB]     


 


Warfarin [Coumadin] 7.5 mg PO QDAY #14 12/03/19  Unknown Rx











Active Meds: 


Active Medications





Amiodarone HCl (Cordarone)  200 mg PO QDAY [facility]


   Last Admin: 12/07/19 13:02 Dose:  200 mg


   Documented by: 


Carvedilol (Coreg)  12.5 mg PO BID [facility]


Clonidine HCl (Catapres-Tts Patch)  0.3 mg TD Q7D [facility]


   Last Admin: 12/07/19 14:42 Dose:  0.3 mg


   Documented by: 


Hydralazine HCl (Apresoline)  50 mg PO Q8HR [facility]


   Last Admin: 12/07/19 13:02 Dose:  50 mg


   Documented by: 


Sodium Chloride (Nacl 0.9%)  100 mls @ 999 mls/hr IV QUITA PRN


   PRN Reason: Hypotension


Minoxidil (Loniten)  2.5 mg PO QDAY [facility]


   Last Admin: 12/07/19 13:03 Dose:  2.5 mg


   Documented by: 


Nifedipine (Procardia Xl)  90 mg PO QDAY [facility]


   Last Admin: 12/07/19 13:02 Dose:  90 mg


   Documented by: 


Pantoprazole Sodium (Protonix)  40 mg PO QDAY [facility]


Sucralfate (Carafate)  1 gm PO [facility]











Review of Systems





- Review of Systems


Constitutional: no weight loss, no weight gain


Cardiovascular: no chest pain


Respiratory: no cough, no shortness of breath


Gastrointestinal: abdominal pain, nausea, vomiting, melena


Rectal: no bleeding


Neurological: weakness





Exam





- Constitutional


Vital Signs: 


                                        











Temp Pulse Resp BP Pulse Ox


 


 98.7 F   87   18   227/118   98 


 


 12/07/19 12:51  12/07/19 12:51  12/07/19 12:51  12/07/19 13:02  12/07/19 12:51











General appearance: no acute distress





- EENT


Eyes: EOM intact


ENT: hearing intact





- Neck


Neck: supple





- Respiratory


Respiratory effort: normal





- Cardiovascular


Rhythm: regular


Heart Sounds: Present: S1 & S2





- Gastrointestinal


General gastrointestinal: Present: soft, non-tender, non-distended, normal bowel

 sounds





- Integumentary


Integumentary: Present: clear, warm





- Neurologic


Neurological: alert and oriented x3





- Labs


CBC & Chem 7: 


                                 12/07/19 07:18





                                 12/07/19 07:18


Lab Results: 


                         Laboratory Results - last 24 hr











  12/07/19 12/07/19 12/07/19





  07:18 07:18 07:18


 


WBC  6.8  


 


RBC  2.79 L  


 


Hgb  8.0 L  


 


Hct  24.9 L  


 


MCV  89  


 


MCH  29  


 


MCHC  32  


 


RDW  18.2 H  


 


Plt Count  253  


 


Lymph % (Auto)  19.3  


 


Mono % (Auto)  7.5 H  


 


Eos % (Auto)  5.2 H  


 


Baso % (Auto)  0.8  


 


Lymph #  1.3  


 


Mono #  0.5  


 


Eos #  0.3  


 


Baso #  0.1  


 


Seg Neutrophils %  67.2  


 


Seg Neutrophils #  4.6  


 


PT   14.4 


 


INR   1.13 


 


APTT   30.9 


 


Sodium    138


 


Potassium    4.3


 


Chloride    96.6 L


 


Carbon Dioxide    18 L


 


Anion Gap    28


 


BUN    86 H


 


Creatinine    13.4 H


 


Estimated GFR    5


 


BUN/Creatinine Ratio    6


 


Glucose    92


 


Calcium    9.0


 


Total Bilirubin    0.30


 


Direct Bilirubin    < 0.2


 


Indirect Bilirubin    0.1


 


AST    21


 


ALT    21


 


Alkaline Phosphatase    117


 


Total Protein    6.2 L


 


Albumin    3.4 L


 


Albumin/Globulin Ratio    1.2


 


Lipase    20














- Imaging


CT Scan: report reviewed





Assessment and Plan





# Epigastric pain/nausea/vomiting


# Reports of melena. 


# Acute on chronic anemia





- ddx including PUD, gastritis, H pylori. 


- CT a/p from last admission unremarkable but without contrast. 


- unclear if patient has been taking any PPI or sucralfate as prescribed from 

last admission. 


- kept lunch down today. 





Rec


- continue with PPI and sucralfate. 


- monitor H/H. 


- will plan for EGD on Monday unless there is further drop in H/H or overt signs

 of active GI bleeding.

## 2019-12-07 NOTE — XRAY REPORT
ABDOMEN 3 VIEW(S)



INDICATION:

Unspecified abdominal pain.



COMPARISON: 

CT abdomen and pelvis without contrast from 12/2/2019.



FINDINGS:

Bowel gas pattern: No significant abnormality.

Free air: None seen.

Stones: None seen.

Chest: Stable cardiomegaly. No acute abnormality.



Additional Findings: No additional significant findings.



IMPRESSION:

No acute abnormality of the abdomen.



Signer Name: Bunny Tomlinson MD 

Signed: 12/7/2019 6:57 AM

 Workstation Name: [id number]

## 2019-12-07 NOTE — EMERGENCY DEPARTMENT REPORT
HPI





- General


Chief Complaint: Abdominal Pain


Time Seen by Provider: 12/07/19 06:09





- HPI


HPI: 





49-year-old  male presents to the emergency department with a 

complaint of generalized abdominal pain and some diarrhea with dark stool that 

has been going on since earlier this week, about 5 days ago.  The patient was 

just recently admitted and discharged from this hospital after presenting with 

chest pain, nausea and vomiting.  He has a history of noncompliance with 

dialysis and received daily dialysis while admitted here.  His nephrologist is 

Dr. Naranjo.  The patient says that he has not had dialysis since as he says the 

left upper extremity fistula has clotted off.  He denies any fever, nausea, 

vomiting, chest pain, shortness of breath.  He also has a past medical history 

of hypertension.  He is anticoagulated on Coumadin. 





ED Past Medical Hx





- Past Medical History


Hx Hypertension: Yes


Hx Renal Disease: Yes (HD TThS)





- Surgical History


Additional Surgical History: Left A/V Graft





- Social History


Smoking Status: Never Smoker


Substance Use Type: None





- Medications


Home Medications: 


                                Home Medications











 Medication  Instructions  Recorded  Confirmed  Last Taken  Type


 


Furosemide [Lasix TAB] 80 mg PO DAILY 01/16/17 12/02/19 01/15/17 History


 


Minoxidil [Loniten] 2.5 mg PO QDAY 01/16/17 12/02/19 01/15/17 History


 


NIFEdipine XL [Procardia Xl] 90 mg PO QDAY 01/16/17 12/02/19 01/15/17 History


 


carvediloL [Coreg] 25 mg PO QDAY 01/16/17 12/02/19 01/15/17 History


 


cloNIDine-TTS PATCH [Catapres-Tts 1 patch TD Q7D 01/16/17 12/02/19 Unknown 

History





0.3mg Patch]     


 


hydrALAZINE [Apresoline TAB] 50 mg PO Q8HR 01/16/17 12/02/19 01/15/17 History


 


Pantoprazole [Protonix TAB] 40 mg PO QDAY #30 tablet 01/20/17 12/02/19 Unknown 

Rx


 


Sucralfate [Carafate] 1 gm PO ACHS #90 tablet 01/20/17 12/02/19 Unknown Rx


 


Amiodarone [Cordarone 200 MG TAB] 200 mg PO QDAY #30 tablet 12/03/19  Unknown Rx


 


Amoxicillin/K Clav Tab [Augmentin 1 each PO Q12HR #14 tablet 12/03/19  Unknown 

Rx





875MG TAB]     


 


Warfarin [Coumadin] 7.5 mg PO QDAY #14 12/03/19  Unknown Rx














ED Review of Systems


ROS: 


Stated complaint: ABDOMINAL PAIN


Other details as noted in HPI





Comment: All other systems reviewed and negative


Constitutional: denies: chills, fever


Eyes: denies: eye pain, vision change


ENT: denies: ear pain, throat pain


Respiratory: denies: cough, shortness of breath


Cardiovascular: denies: chest pain, palpitations


Gastrointestinal: abdominal pain, diarrhea, melena.  denies: nausea, vomiting


Genitourinary: denies: dysuria, discharge


Musculoskeletal: denies: back pain, arthralgia


Skin: denies: rash, lesions


Neurological: denies: headache, weakness





Physical Exam





- Physical Exam


Vital Signs: 


                                   Vital Signs











  12/07/19





  06:06


 


Temperature 98.6 F











Physical Exam: 





GENERAL: The patient is well-developed well-nourished.


HENT: Normocephalic.  Atraumatic.    Patient has moist mucous membranes.


EYES: Extraocular motions are intact.  Pupils equal reactive to light bilateral

ly.


NECK: Supple. Trachea is midline.


CHEST/LUNGS: Clear to auscultation.  There is no respiratory distress noted.


HEART/CARDIOVASCULAR: Regular.  There is no tachycardia.  There is no murmur.


ABDOMEN: Abdomen is soft.  Generalized tenderness to palpation to the abdomen.  

No guarding.  Patient has normal bowel sounds.  There is no abdominal 

distention.


SKIN: Skin is warm and dry.


NEURO: The patient is awake, alert, and oriented.  The patient is cooperative.  

The patient has no focal neurologic deficits.  Normal speech.


MUSCULOSKELETAL: There is no tenderness or deformity. There is no evidence of 

acute injury.  There is a left upper extremity AV graft that has a palpable 

thrill.


RECTAL: No gross blood.  Very little stool obtained for guaiac testing.





ED Course


                                   Vital Signs











  12/07/19





  06:06


 


Temperature 98.6 F














- Consultations


Consultation #1: 





12/07/19 08:04


I spoke with Dr. Stuart, nephrology, who will arrange for the patient to 

get dialysis today.  If the graft is in fact malfunctioning, they will contact 

vascular.





ED Medical Decision Making





- Lab Data


Result diagrams: 


                                 12/07/19 07:18





                                 12/07/19 07:18





- Radiology Data


Radiology results: image reviewed


interpreted by me: 





Chest x-ray does not show any acute process.  There are no pleural effusions, 

obvious pneumonia and there is no pneumothorax.





Abdominal x-ray shows nonspecific nonobstructive bowel gas





- Medical Decision Making





This patient presents to the emergency department with a complaint of abdominal 

pain and the inability to get dialysis secondary to an alleged clotted left 

upper extremity AV graft.  His labs are mostly unremarkable except for the 

elevated BUN and creatinine levels consistent with his end-stage renal disease 

on hemodialysis.  Patient does present quite hypertensive and was given doses of

 hydralazine and labetalol, along with some morphine for pain control.  

Nephrology was contacted and consult.  The patient will get dialyzed this 

morning. 





The patient also had some complaints of having some dark stool over the past few

 days.  Rectal examination was done that does not show any gross blood.  While 

there was not much stool for guaiac testing there was no obvious melena. 





 He was accepted for admission by the hospitalist service.





- Differential Diagnosis


gastritis, colitis, bowel obstruction


Critical Care Time: No


Critical care attestation.: 


If time is entered above; I have spent that time in minutes in the direct care 

of this critically ill patient, excluding procedure time.








ED Disposition


Clinical Impression: 


 End-stage renal disease needing dialysis, Hypertensive urgency





Abdominal pain


Qualifiers:


 Abdominal location: generalized Qualified Code(s): R10.84 - Generalized 

abdominal pain





Disposition: DC-09 OP ADMIT IP TO THIS HOSP


Is pt being admited?: Yes


Condition: Fair


Time of Disposition: 08:04

## 2019-12-07 NOTE — HISTORY AND PHYSICAL REPORT
History of Present Illness


Date of examination: 12/07/19


Date of admission: 


12/07/19 08:05





Chief complaint: 


Stomach pains


History of present illness: 


[place] 50 [facility] [place] with a history of ESRD 

on hemodialysis TTS, hypertension, AOCD and pAFib on Warfarin who presents to 

[facility] ED with unresolved epigastric abdominal pains/n/v and a report of clotted 

left upper extremity AV graft since patient discharge from here on Tuesday, Dec.

4th. During that admission he had a negative stress test. He was found to have 

pAFib and started on Amiodarone. The abdominal pains never resolved and it 

prevented him from going to hemodialysis on Thursday. He also having intractable

n/v which never went away from last hospitalization, so he has not been taking 

his bp medications consistently; therefore, BP is uncontrolled. He denies any 

fever, nausea, vomiting, chest pain, shortness of breath.  He also has a past 

medical history of hypertension.  He is anti-coagulated on Coumadin. The patient

also had some complaints of having some dark stool over the past few days.  

Rectal examination was done that does not show any gross blood.  While there was

not much stool for guaiac testing there was no obvious melena. Patient seen in 

HD center and the Left AVG is functioning normally. 





PMH: as hpi


PSH: Multiple HD access device, currently Left upper AVG


SH: no tob, no etoh, no illegal drugs


FH: hypertension





ROS: 


Constitutional: denies: fever 


ENT: denies: throat or neck pain 


Respiratory: denies: cough, shortness of breath 


Cardiovascular: denies: chest pain 


Endocrine: denies unexplained weight loss or gain 


Gastrointestinal: + abdominal pain, nausea 


Genitourinary: denies: dysuria 


Rectal: denies no incontinence, no bleeding, no itching, no discharge 


Musculoskeletal: denies swelling, myalgia, muscle weakness 


Skin: denies: rash 


Neurological: denies: headache 


Hematological/Lymphatic: denies: easy bleeding or easy bruising 


Allergic/Immunologic: no urticaria, no allergic rhinitis, no anaphylaxis 


Psych: denies sadness or hopelessness, SI/HI 





Medications and Allergies


                                    Allergies











Allergy/AdvReac Type Severity Reaction Status Date / Time


 


No Known Allergies Allergy   Unverified 01/16/17 08:22











                                Home Medications











 Medication  Instructions  Recorded  Confirmed  Last Taken  Type


 


Furosemide [Lasix TAB] 80 mg PO DAILY 01/16/17 12/02/19 01/15/17 History


 


Minoxidil [Loniten] 2.5 mg PO QDAY 01/16/17 12/02/19 01/15/17 History


 


NIFEdipine XL [Procardia Xl] 90 mg PO QDAY 01/16/17 12/02/19 01/15/17 History


 


carvediloL [Coreg] 25 mg PO QDAY 01/16/17 12/02/19 01/15/17 History


 


cloNIDine-TTS PATCH [Catapres-Tts 1 patch TD Q7D 01/16/17 12/02/19 Unknown 

History





0.3mg Patch]     


 


hydrALAZINE [Apresoline TAB] 50 mg PO Q8HR 01/16/17 12/02/19 01/15/17 History


 


Pantoprazole [Protonix TAB] 40 mg PO QDAY #30 tablet 01/20/17 12/02/19 Unknown 

Rx


 


Sucralfate [Carafate] 1 gm PO ACHS #90 tablet 01/20/17 12/02/19 Unknown Rx


 


Amiodarone [Cordarone 200 MG TAB] 200 mg PO QDAY #30 tablet 12/03/19  Unknown Rx


 


Amoxicillin/K Clav Tab [Augmentin 1 each PO Q12HR #14 tablet 12/03/19  Unknown 

Rx





875MG TAB]     


 


Warfarin [Coumadin] 7.5 mg PO QDAY #14 12/03/19  Unknown Rx











Active Meds: 


Active Medications





Amiodarone HCl (Cordarone)  200 mg PO QDAY ARRON


Carvedilol (Coreg)  12.5 mg PO BID ARRON


Clonidine HCl (Catapres-Tts Patch)  0.3 mg TD Q7D ARRON


Hydralazine HCl (Apresoline)  50 mg PO Q8HR ARRON


Sodium Chloride (Nacl 0.9%)  100 mls @ 999 mls/hr IV QUITA PRN


   PRN Reason: Hypotension


Minoxidil (Loniten)  2.5 mg PO QDAY ARRON


Nifedipine (Procardia Xl)  90 mg PO QDAY ARRON


Pantoprazole Sodium (Protonix)  40 mg PO QDAY ARRON


Sucralfate (Carafate)  1 gm PO ACHS ARRON











Exam





- Physical Exam


Narrative exam: 


Gen: WDWN, NAD, Awake, Alert, Orientated x 3


HEENT: NCAT, EOMI, PERRL, OP Clear 


Neck: supple, no adenopathy, no thyromegaly, no JVD 


CVS/Heart: RRR, normal S1S2, pulses present bilaterally 


Chest/Lungs: CTA B, Symmetrical chest expansion, good air entry bilaterally 


GI/Abdomen: soft, nondistended, mild epigastic tenderness, good bowel sounds, no

 guarding or rebound 


/Bladder: no suprapubic tenderness, no CVA or paraspinal tenderness 


Extermity/Skin: no c/c/e, no obvious rash 


MSK: FROM x 4 


Neuro: CN 2-12 grossly intact, no new focal deficits 


Psych: calm 





- Constitutional


Vitals: 


                                        











Temp Pulse Resp BP Pulse Ox


 


 98.6 F   73   18   232/125   99 


 


 12/07/19 08:40  12/07/19 12:00  12/07/19 08:40  12/07/19 12:00  12/07/19 06:25














Results





- Labs


CBC & Chem 7: 


                                 12/07/19 07:18





                                 12/07/19 07:18


Labs: 


                             Laboratory Last Values











WBC  6.8 K/mm3 (4.5-11.0)   12/07/19  07:18    


 


RBC  2.79 M/mm3 (3.65-5.03)  L  12/07/19  07:18    


 


Hgb  8.0 gm/dl (11.8-15.2)  L  12/07/19  07:18    


 


Hct  24.9 % (35.5-45.6)  L  12/07/19  07:18    


 


MCV  89 fl (84-94)   12/07/19  07:18    


 


MCH  29 pg (28-32)   12/07/19  07:18    


 


MCHC  32 % (32-34)   12/07/19  07:18    


 


RDW  18.2 % (13.2-15.2)  H  12/07/19  07:18    


 


Plt Count  253 K/mm3 (140-440)   12/07/19  07:18    


 


Lymph % (Auto)  19.3 % (13.4-35.0)   12/07/19  07:18    


 


Mono % (Auto)  7.5 % (0.0-7.3)  H  12/07/19  07:18    


 


Eos % (Auto)  5.2 % (0.0-4.3)  H  12/07/19  07:18    


 


Baso % (Auto)  0.8 % (0.0-1.8)   12/07/19  07:18    


 


Lymph #  1.3 K/mm3 (1.2-5.4)   12/07/19  07:18    


 


Mono #  0.5 K/mm3 (0.0-0.8)   12/07/19  07:18    


 


Eos #  0.3 K/mm3 (0.0-0.4)   12/07/19  07:18    


 


Baso #  0.1 K/mm3 (0.0-0.1)   12/07/19  07:18    


 


Seg Neutrophils %  67.2 % (40.0-70.0)   12/07/19  07:18    


 


Seg Neutrophils #  4.6 K/mm3 (1.8-7.7)   12/07/19  07:18    


 


PT  14.4 Sec. (12.2-14.9)   12/07/19  07:18    


 


INR  1.13  (0.87-1.13)   12/07/19  07:18    


 


APTT  30.9 Sec. (24.2-36.6)   12/07/19  07:18    


 


Sodium  138 mmol/L (137-145)   12/07/19  07:18    


 


Potassium  4.3 mmol/L (3.6-5.0)   12/07/19  07:18    


 


Chloride  96.6 mmol/L ()  L  12/07/19  07:18    


 


Carbon Dioxide  18 mmol/L (22-30)  L  12/07/19  07:18    


 


Anion Gap  28 mmol/L  12/07/19  07:18    


 


BUN  86 mg/dL (9-20)  H  12/07/19  07:18    


 


Creatinine  13.4 mg/dL (0.8-1.5)  H  12/07/19  07:18    


 


Estimated GFR  5 ml/min  12/07/19  07:18    


 


BUN/Creatinine Ratio  6 %  12/07/19  07:18    


 


Glucose  92 mg/dL ()   12/07/19  07:18    


 


Calcium  9.0 mg/dL (8.4-10.2)   12/07/19  07:18    


 


Total Bilirubin  0.30 mg/dL (0.1-1.2)   12/07/19  07:18    


 


Direct Bilirubin  < 0.2 mg/dL (0-0.2)   12/07/19  07:18    


 


Indirect Bilirubin  0.1 mg/dL  12/07/19  07:18    


 


AST  21 units/L (5-40)   12/07/19  07:18    


 


ALT  21 units/L (7-56)   12/07/19  07:18    


 


Alkaline Phosphatase  117 units/L ()   12/07/19  07:18    


 


Total Protein  6.2 g/dL (6.3-8.2)  L  12/07/19  07:18    


 


Albumin  3.4 g/dL (3.9-5)  L  12/07/19  07:18    


 


Albumin/Globulin Ratio  1.2 %  12/07/19  07:18    


 


Lipase  20 units/L (13-60)   12/07/19  07:18    














Assessment and Plan


Assessment and plan: 


Patient is a 50 [place] [facility]  [place] with a history of ESRD 

on hemodialysis TTS, hypertension, AOCD and pAFib on Warfarin who presents to 

[facility] ED with unresolved epigastric abdominal pains/n/v and a report of clotted 

left upper extremity AV graft since patient discharge from here on Tuesday, Dec.

 4th. During that admission he had a negative stress test. He was found to have 

pAFib and started on Amiodarone. The abdominal pains never resolved and it 

prevented him from going to hemodialysis on Thursday. He also having intractable

 n/v which never went away from last hospitalization, so he has not been taking 

his bp medications consistently; therefore, BP is uncontrolled. He denies any 

fever, nausea, vomiting, chest pain, shortness of breath.  He also has a past 

medical history of hypertension.  He is anti-coagulated on Coumadin. The patient

 also had some complaints of having some dark stool over the past few days.  

Rectal examination was done that does not show any gross blood.  While there was

 not much stool for guaiac testing there was no obvious melena. Patient seen in 

HD center and the Left AVG is functioning normally. 





* pChest x-ray does not show any acute process. There are no pleural effusions, 

  obvious pneumonia and there is no pneumothorax.


* Abdominal x-ray shows nonspecific nonobstructive bowel gas


* 12/2/19 CT abd/pelvis without contrast Impression: No definite acute finding 

  appreciated within the limits of the exam. Extensive respiratory motion 

  artifact limits the exam, mild anasarca. 





Intractable N/V/Epigastric Abd pains on anticoagulation with anemia warrants 

inpatient GI consultation. Treat with PPI


ESRD, missed HD: needing Hemodialysis, Nephrology consulted, counseling on 

compliance


Malignant Hypertension: iv hydralazine not helping, restarted home medications 

which patient was noncompliance with. 


AOCD with drop in h/h: monitor closely, repeat levels, 


pAFib: continue amiodarone, hold warfarin due to the anemia and poor compliance


Noncompliance; counseling done


DVT ppx scd only


preventive health screening 16 minutes


Advance care planning 30 minutes





Disposition: GI evaluation due intractable n/v and to drop in h/h from 9.5 on 

tues to 8.0 today.

## 2019-12-07 NOTE — CONSULTATION
History of Present Illness





- Reason for Consult


Consult date: 12/07/19


end stage renal disease





- History of Present Illness





Mr. Barker is a a 50yo with ESRD on hemodialysis TTS, hypertension, atrial 

fibrillation on oral anticoagulation who presented to the ED w/ complaint of 

abdominal pain and a report of clotted left upper extremity AV graft.  He was 

discharged on Tuesday, Dec 4th and ha since patient discharged from here on 

Tuesday, Dec 3rd and has not received dialysis since that time.  





He reports abdominal pain has failed to improve.  He also reports N/V. 





Past History


Past Medical History: atrial fib, ESRD, hypertension


Past Surgical History: Other (BASIL AVG)


Social history: no significant social history


Family history: no significant family history





Medications and Allergies


                                    Allergies











Allergy/AdvReac Type Severity Reaction Status Date / Time


 


No Known Allergies Allergy   Unverified 01/16/17 08:22











                                Home Medications











 Medication  Instructions  Recorded  Confirmed  Last Taken  Type


 


Furosemide [Lasix TAB] 80 mg PO DAILY 01/16/17 12/02/19 01/15/17 History


 


Minoxidil [Loniten] 2.5 mg PO QDAY 01/16/17 12/02/19 01/15/17 History


 


NIFEdipine XL [Procardia Xl] 90 mg PO QDAY 01/16/17 12/02/19 01/15/17 History


 


carvediloL [Coreg] 25 mg PO QDAY 01/16/17 12/02/19 01/15/17 History


 


cloNIDine-TTS PATCH [Catapres-Tts 1 patch TD Q7D 01/16/17 12/02/19 Unknown 

History





0.3mg Patch]     


 


hydrALAZINE [Apresoline TAB] 50 mg PO Q8HR 01/16/17 12/02/19 01/15/17 History


 


Pantoprazole [Protonix TAB] 40 mg PO QDAY #30 tablet 01/20/17 12/02/19 Unknown 

Rx


 


Sucralfate [Carafate] 1 gm PO ACHS #90 tablet 01/20/17 12/02/19 Unknown Rx


 


Amiodarone [Cordarone 200 MG TAB] 200 mg PO QDAY #30 tablet 12/03/19  Unknown Rx


 


Amoxicillin/K Clav Tab [Augmentin 1 each PO Q12HR #14 tablet 12/03/19  Unknown 

Rx





875MG TAB]     


 


Warfarin [Coumadin] 7.5 mg PO QDAY #14 12/03/19  Unknown Rx











Active Meds: 


Active Medications





Amiodarone HCl (Cordarone)  200 mg PO QDAY ARRON


Carvedilol (Coreg)  12.5 mg PO BID ARRON


Clonidine HCl (Catapres-Tts Patch)  0.3 mg TD Q7D ARRON


Hydralazine HCl (Apresoline)  50 mg PO Q8HR ARRON


Sodium Chloride (Nacl 0.9%)  100 mls @ 999 mls/hr IV QUITA PRN


   PRN Reason: Hypotension


Minoxidil (Loniten)  2.5 mg PO QDAY ARRON


Nifedipine (Procardia Xl)  90 mg PO QDAY ARRON


Pantoprazole Sodium (Protonix)  40 mg PO QDAY ARRON


Sucralfate (Carafate)  1 gm PO ACHS ARRON











Review of Systems


All systems: negative





Exam





- Vital Signs


Vital signs: 


                                   Vital Signs











Temp


 


 98.6 F 


 


 12/07/19 06:06














- General Appearance


General appearance: well-developed, well-nourished


EENT: ATNC, mucous membranes moist


Respiratory: Clear to Ascultation


Heart: regular, S1S2


Gastrointestinal: Present: normal.  Absent: tenderness, distended


Integumentary: no rash, warm and dry


Neurologic: no focal deficit


Musculoskeletal: Present: other (no edema)


Psychiatric: cooperative





Results





- Lab Results





                                 12/07/19 07:18





                                 12/07/19 07:18


                             Most recent lab results











Calcium  9.0 mg/dL (8.4-10.2)   12/07/19  07:18    














Assessment and Plan





Impression:


* End stage renal disease


* Abdominal pain


* Atrial fibrillation on chronic anticoagulation


* Hypertension


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


* Secondary hyperparathyroidism


* Medical noncompliance





Plan:


* Hemodialysis today - BASIL AVG used successfully w/o difficulty


* Continue HD TTS


* UF as tolerated


* GI consulation pending


* Epogen TIW prn


* Renal diet


* Dose medications for renal function

## 2019-12-08 LAB
HCT VFR BLD CALC: 29 % (ref 35.5–45.6)
HGB BLD-MCNC: 9.2 GM/DL (ref 11.8–15.2)
INR PPP: 1.07 (ref 0.87–1.13)
MCHC RBC AUTO-ENTMCNC: 32 % (ref 32–34)
MCV RBC AUTO: 90 FL (ref 84–94)
PLATELET # BLD: 284 K/MM3 (ref 140–440)
RBC # BLD AUTO: 3.22 M/MM3 (ref 3.65–5.03)

## 2019-12-08 RX ADMIN — SUCRALFATE SCH GM: 1 TABLET ORAL at 21:55

## 2019-12-08 RX ADMIN — SUCRALFATE SCH GM: 1 TABLET ORAL at 08:39

## 2019-12-08 RX ADMIN — SUCRALFATE SCH GM: 1 TABLET ORAL at 12:47

## 2019-12-08 RX ADMIN — SUCRALFATE SCH GM: 1 TABLET ORAL at 17:38

## 2019-12-08 RX ADMIN — NIFEDIPINE SCH MG: 90 TABLET, EXTENDED RELEASE ORAL at 09:42

## 2019-12-08 RX ADMIN — MINOXIDIL SCH MG: 2.5 TABLET ORAL at 09:41

## 2019-12-08 RX ADMIN — AMIODARONE HYDROCHLORIDE SCH MG: 200 TABLET ORAL at 09:41

## 2019-12-08 RX ADMIN — PANTOPRAZOLE SODIUM SCH MG: 40 TABLET, DELAYED RELEASE ORAL at 09:43

## 2019-12-08 NOTE — PROGRESS NOTE
Assessment and Plan





Impression:


* End stage renal disease


* Abdominal pain


* Atrial fibrillation on chronic anticoagulation


* Hypertension


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


* Secondary hyperparathyroidism


* Medical noncompliance





Plan:


* Patient is s/p hemodialysis yesterday - BASIL AVG used successfully w/o 

  difficulty


* Continue HD TTS schedule - no acute need for HD today


* UF as tolerated


* GI recommendations reviewed - note plans for EGD in AM


* Continue antiHTN medications


* Epogen TIW prn


* Renal diet


* Dose medications for renal function


* AM labs





Subjective


Date of service: 12/08/19


Interval history: 


Reports no complaints today.  States abdominal pain improved.





Objective





- Vital Signs


Vital signs: 


                               Vital Signs - 12hr











  12/08/19 12/08/19 12/08/19





  04:38 05:05 09:42


 


Temperature 98.2 F  


 


Pulse Rate 87 96 H 91 H


 


Respiratory 18  





Rate   


 


Blood Pressure 151/83 151/83 167/92


 


O2 Sat by Pulse 97  98





Oximetry   














  12/08/19 12/08/19





  11:48 13:19


 


Temperature 98.7 F 


 


Pulse Rate 89 


 


Respiratory 18 





Rate  


 


Blood Pressure 165/94 165/94


 


O2 Sat by Pulse 96 





Oximetry  














- General Appearance


General appearance: well-developed, well-nourished


EENT: ATNC


Neck: no JVD


Respiratory: Present: Clear to Ascultation


Cardiology: regular, S1S2


Gastrointestinal: tenderness (mild epigastric tenderness with palpation)


Integumentary: no rash


Neurologic: no focal deficit, alert and oriented x3


Musculoskeletal: other (no edema)


Psychiatric: cooperative





- Lab





                                 12/08/19 09:22





                                 12/07/19 07:18


                             Most recent lab results











Calcium  9.0 mg/dL (8.4-10.2)   12/07/19  07:18    














Medications & Allergies





- Medications


Allergies/Adverse Reactions: 


                                    Allergies





No Known Allergies Allergy (Unverified 01/16/17 08:22)


   








Home Medications: 


                                Home Medications











 Medication  Instructions  Recorded  Confirmed  Last Taken  Type


 


Furosemide [Lasix TAB] 80 mg PO DAILY 01/16/17 12/08/19 12/06/19 History


 


NIFEdipine XL [Procardia Xl] 90 mg PO QDAY 01/16/17 12/08/19 12/06/19 History


 


carvediloL [Coreg] 25 mg PO QDAY 01/16/17 12/08/19 12/06/19 History


 


cloNIDine-TTS PATCH [Catapres-Tts 1 patch TD Q7D 01/16/17 12/08/19 12/07/19 

History





0.3mg Patch]     


 


hydrALAZINE [Apresoline TAB] 100 mg PO Q8HR 01/16/17 12/08/19 12/06/19 History


 


Pantoprazole [Protonix TAB] 40 mg PO QDAY #30 tablet 01/20/17 12/08/19 12/06/19 

Rx


 


Sucralfate [Carafate] 1 gm PO ACHS #90 tablet 01/20/17 12/08/19 12/07/19 Rx


 


Amiodarone [Cordarone 200 MG TAB] 200 mg PO QDAY #30 tablet 12/03/19 12/08/19 

Unknown Rx


 


Warfarin [Coumadin] 7.5 mg PO QDAY #14 12/03/19 12/08/19 Unknown Rx











Active Medications: 














Generic Name Dose Route Start Last Admin





  Trade Name Freq  PRN Reason Stop Dose Admin


 


Amiodarone HCl  200 mg  12/07/19 13:00  12/08/19 09:41





  Cordarone  PO   200 mg





  QDAY ARRON   Administration


 


Carvedilol  12.5 mg  12/07/19 22:00  12/08/19 09:42





  Coreg  PO   12.5 mg





  BID ARRON   Administration


 


Clonidine HCl  0.3 mg  12/07/19 13:00  12/07/19 14:42





  Catapres-Tts Patch  TD   0.3 mg





  Q7D ARRON   Administration


 


Hydralazine HCl  50 mg  12/07/19 14:00  12/08/19 13:19





  Apresoline  PO   50 mg





  Q8HR ARRON   Administration


 


Sodium Chloride  100 mls @ 999 mls/hr  12/07/19 08:35 





  Nacl 0.9%  IV  





  QUITA PRN  





  Hypotension  


 


Minoxidil  2.5 mg  12/07/19 13:00  12/08/19 09:41





  Loniten  PO   2.5 mg





  QDAY ARRON   Administration


 


Nifedipine  90 mg  12/07/19 13:00  12/08/19 09:42





  Procardia Xl  PO   90 mg





  QDAY ARRON   Administration


 


Pantoprazole Sodium  40 mg  12/08/19 10:00  12/08/19 09:43





  Protonix  PO   40 mg





  QDAY ARRON   Administration


 


Sucralfate  1 gm  12/07/19 16:30  12/08/19 12:47





  Carafate  PO   1 gm





  ACHS ARRON   Administration

## 2019-12-08 NOTE — PROGRESS NOTE
Assessment and Plan


Assessment and plan: 


Patient is a 48 yo man from [place], [place] with a history of ESRD on hemodialysis TTS,

hypertension, AOCD and pAFib on Warfarin who presents to [facility] ED with unresolved

epigastric abdominal pains/n/v and a report of clotted left upper extremity AV 

graft since patient discharge from here on Tuesday, Dec. 4th. During that 

admission he had a negative stress test. He was found to have pAFib and started 

on Amiodarone. The abdominal pains never resolved and it prevented him from 

going to hemodialysis on Thursday. He also having intractable n/v which never 

went away from last hospitalization, so he has not been taking his bp 

medications consistently; therefore, BP is uncontrolled. He denies any fever, 

nausea, vomiting, chest pain, shortness of breath.  He also has a past medical 

history of hypertension.  He is anti-coagulated on Coumadin. The patient also 

had some complaints of having some dark stool over the past few days.  Rectal 

examination was done that does not show any gross blood.  While there was not 

much stool for guaiac testing there was no obvious melena. Patient seen in HD 

center and the Left AVG is functioning normally. 





* pChest x-ray does not show any acute process. There are no pleural effusions, 

  obvious pneumonia and there is no pneumothorax.


* Abdominal x-ray shows nonspecific nonobstructive bowel gas


* 12/2/19 CT abd/pelvis without contrast Impression: No definite acute finding a

  ppreciated within the limits of the exam. Extensive respiratory motion 

  artifact limits the exam, mild anasarca. 





Intractable N/V/Epigastric Abd pains: Treating w/ PPI and sucrafrate, d/w GI


ESRD, missed HD: needing Hemodialysis, Nephrology consulted, counseling on 

compliance


Malignant Hypertension: iv hydralazine not helping, restarted home medications 

which patient was noncompliance with. 


AOCD with drop in h/h: monitor closely, repeat levels, 


pAFib: continue amiodarone, hold warfarin due to the anemia and poor compliance


Noncompliance; counseling done


DVT ppx scd only


full code





Disposition: continue inpatient care, EGD tomorrow then hopefully d/c soon after








History


Interval history: 


Patient was seen and examined. Follow-up on current diagnosis n/v. No overnight 

events reported to me. Patient denies any chest pain, shortness breath, or 

severe headaches. Imaging, nursing note, chart, labs and old chart reviewed. 

Discussed with patient. 








Hospitalist Physical





- Physical exam


Narrative exam: 


Gen: WDWN, NAD, Awake, Alert, Orientated x 3


HEENT: NCAT, EOMI, PERRL, OP Clear 


Neck: supple, no adenopathy, no thyromegaly, no JVD 


CVS/Heart: RRR, normal S1S2, pulses present bilaterally 


Chest/Lungs: CTA B, Symmetrical chest expansion, good air entry bilaterally 


GI/Abdomen: soft, nondistended, mild epigastic tenderness, good bowel sounds, no

guarding or rebound 


/Bladder: no suprapubic tenderness, no CVA or paraspinal tenderness 


Extermity/Skin: no c/c/e, no obvious rash 


MSK: FROM x 4 


Neuro: CN 2-12 grossly intact, no new focal deficits 


Psych: calm 





- Constitutional


Vitals: 


                                        











Temp Pulse Resp BP Pulse Ox


 


 98.2 F   96 H  18   167/92   97 


 


 12/08/19 04:38  12/08/19 05:05  12/08/19 04:38  12/08/19 09:42  12/08/19 04:38














Results





- Labs


CBC & Chem 7: 


                                 12/08/19 09:22





                                 12/07/19 07:18


Labs: 


                             Laboratory Last Values











WBC  5.4 K/mm3 (4.5-11.0)   12/08/19  09:22    


 


RBC  3.22 M/mm3 (3.65-5.03)  L  12/08/19  09:22    


 


Hgb  9.2 gm/dl (11.8-15.2)  L  12/08/19  09:22    


 


Hct  29.0 % (35.5-45.6)  L  12/08/19  09:22    


 


MCV  90 fl (84-94)   12/08/19  09:22    


 


MCH  29 pg (28-32)   12/08/19  09:22    


 


MCHC  32 % (32-34)   12/08/19  09:22    


 


RDW  18.0 % (13.2-15.2)  H  12/08/19  09:22    


 


Plt Count  284 K/mm3 (140-440)   12/08/19  09:22    


 


Lymph % (Auto)  19.3 % (13.4-35.0)   12/07/19  07:18    


 


Mono % (Auto)  7.5 % (0.0-7.3)  H  12/07/19  07:18    


 


Eos % (Auto)  5.2 % (0.0-4.3)  H  12/07/19  07:18    


 


Baso % (Auto)  0.8 % (0.0-1.8)   12/07/19  07:18    


 


Lymph #  1.3 K/mm3 (1.2-5.4)   12/07/19  07:18    


 


Mono #  0.5 K/mm3 (0.0-0.8)   12/07/19  07:18    


 


Eos #  0.3 K/mm3 (0.0-0.4)   12/07/19  07:18    


 


Baso #  0.1 K/mm3 (0.0-0.1)   12/07/19  07:18    


 


Seg Neutrophils %  67.2 % (40.0-70.0)   12/07/19  07:18    


 


Seg Neutrophils #  4.6 K/mm3 (1.8-7.7)   12/07/19  07:18    


 


PT  14.4 Sec. (12.2-14.9)   12/07/19  07:18    


 


INR  1.13  (0.87-1.13)   12/07/19  07:18    


 


APTT  30.9 Sec. (24.2-36.6)   12/07/19  07:18    


 


Sodium  138 mmol/L (137-145)   12/07/19  07:18    


 


Potassium  4.3 mmol/L (3.6-5.0)   12/07/19  07:18    


 


Chloride  96.6 mmol/L ()  L  12/07/19  07:18    


 


Carbon Dioxide  18 mmol/L (22-30)  L  12/07/19  07:18    


 


Anion Gap  28 mmol/L  12/07/19  07:18    


 


BUN  86 mg/dL (9-20)  H  12/07/19  07:18    


 


Creatinine  13.4 mg/dL (0.8-1.5)  H  12/07/19  07:18    


 


Estimated GFR  5 ml/min  12/07/19  07:18    


 


BUN/Creatinine Ratio  6 %  12/07/19  07:18    


 


Glucose  92 mg/dL ()   12/07/19  07:18    


 


Calcium  9.0 mg/dL (8.4-10.2)   12/07/19  07:18    


 


Total Bilirubin  0.30 mg/dL (0.1-1.2)   12/07/19  07:18    


 


Direct Bilirubin  < 0.2 mg/dL (0-0.2)   12/07/19  07:18    


 


Indirect Bilirubin  0.1 mg/dL  12/07/19  07:18    


 


AST  21 units/L (5-40)   12/07/19  07:18    


 


ALT  21 units/L (7-56)   12/07/19  07:18    


 


Alkaline Phosphatase  117 units/L ()   12/07/19  07:18    


 


Total Protein  6.2 g/dL (6.3-8.2)  L  12/07/19  07:18    


 


Albumin  3.4 g/dL (3.9-5)  L  12/07/19  07:18    


 


Albumin/Globulin Ratio  1.2 %  12/07/19  07:18    


 


Lipase  20 units/L (13-60)   12/07/19  07:18    














Active Medications





- Current Medications


Current Medications: 














Generic Name Dose Route Start Last Admin





  Trade Name Freq  PRN Reason Stop Dose Admin


 


Amiodarone HCl  200 mg  12/07/19 13:00  12/08/19 09:41





  Cordarone  PO   200 mg





  QDAY ARRON   Administration


 


Carvedilol  12.5 mg  12/07/19 22:00  12/08/19 09:42





  Coreg  PO   12.5 mg





  BID ARRON   Administration


 


Clonidine HCl  0.3 mg  12/07/19 13:00  12/07/19 14:42





  Catapres-Tts Patch  TD   0.3 mg





  Q7D ARRON   Administration


 


Hydralazine HCl  50 mg  12/07/19 14:00  12/08/19 05:05





  Apresoline  PO   50 mg





  Q8HR ARRON   Administration


 


Sodium Chloride  100 mls @ 999 mls/hr  12/07/19 08:35 





  Nacl 0.9%  IV  





  QUITA PRN  





  Hypotension  


 


Minoxidil  2.5 mg  12/07/19 13:00  12/08/19 09:41





  Loniten  PO   2.5 mg





  QDAY ARRON   Administration


 


Nifedipine  90 mg  12/07/19 13:00  12/08/19 09:42





  Procardia Xl  PO   90 mg





  QDAY ARRON   Administration


 


Pantoprazole Sodium  40 mg  12/08/19 10:00  12/08/19 09:43





  Protonix  PO   40 mg





  QDAY ARRON   Administration


 


Sucralfate  1 gm  12/07/19 16:30  12/08/19 08:39





  Carafate  PO   1 gm





  ACHS ARRON   Administration

## 2019-12-08 NOTE — GASTROENTEROLOGY PROGRESS NOTE
Assessment and Plan





# Epigastric pain/nausea/vomiting


# Reports of melena. 


# Acute on chronic anemia





- ddx including PUD, gastritis, H pylori. 


- CT a/p from last admission unremarkable but without contrast. 


- patient has not been taking any PPI or sucralfate as prescribed from last 

admission. 


- clinically improving somewhat. 





Rec


- continue with PPI and sucralfate. 


- monitor H/H. 


- will plan for EGD on Monday unless there is further drop in H/H or overt signs

of active GI bleeding. 


- NPO MN. 








Subjective


Date of service: 12/08/19


Interval history: 





patient reports abdominal pain somewhat better and keeping food down. tolerating

diet. 





Objective





- Constitutional


Vitals: 


                                        











Temp Pulse Resp BP Pulse Ox


 


 98.2 F   96 H  18   151/83   97 


 


 12/08/19 04:38  12/08/19 05:05  12/08/19 04:38  12/08/19 05:05  12/08/19 04:38











General appearance: no acute distress





- Neck


Neck: supple





- Respiratory


Respiratory effort: normal





- Cardiovascular


Rhythm: regular


Heart Sounds: Present: S1 & S2





- Gastrointestinal


General gastrointestinal: Present: soft, non-tender, non-distended





- Integumentary


Integumentary: Present: warm





- Neurologic


Neurological: alert and oriented x3





- Labs


CBC & Chem 7: 


                                 12/07/19 15:52





                                 12/07/19 07:18


Labs: 


                         Laboratory Results - last 24 hr











  12/07/19





  15:52


 


Hgb  9.0 L


 


Hct  27.7 L

## 2019-12-09 VITALS — SYSTOLIC BLOOD PRESSURE: 179 MMHG | DIASTOLIC BLOOD PRESSURE: 104 MMHG

## 2019-12-09 LAB
BUN SERPL-MCNC: 54 MG/DL (ref 9–20)
BUN/CREAT SERPL: 5 %
CALCIUM SERPL-MCNC: 9.4 MG/DL (ref 8.4–10.2)
HEMOLYSIS INDEX: 1

## 2019-12-09 PROCEDURE — 0DB98ZX EXCISION OF DUODENUM, VIA NATURAL OR ARTIFICIAL OPENING ENDOSCOPIC, DIAGNOSTIC: ICD-10-PCS | Performed by: INTERNAL MEDICINE

## 2019-12-09 PROCEDURE — 0DB68ZX EXCISION OF STOMACH, VIA NATURAL OR ARTIFICIAL OPENING ENDOSCOPIC, DIAGNOSTIC: ICD-10-PCS | Performed by: INTERNAL MEDICINE

## 2019-12-09 RX ADMIN — SUCRALFATE SCH GM: 1 TABLET ORAL at 13:08

## 2019-12-09 RX ADMIN — PANTOPRAZOLE SODIUM SCH MG: 40 TABLET, DELAYED RELEASE ORAL at 13:11

## 2019-12-09 RX ADMIN — NIFEDIPINE SCH MG: 90 TABLET, EXTENDED RELEASE ORAL at 13:10

## 2019-12-09 RX ADMIN — AMIODARONE HYDROCHLORIDE SCH MG: 200 TABLET ORAL at 13:11

## 2019-12-09 RX ADMIN — SUCRALFATE SCH: 1 TABLET ORAL at 10:41

## 2019-12-09 RX ADMIN — MINOXIDIL SCH: 2.5 TABLET ORAL at 11:46

## 2019-12-09 RX ADMIN — AMIODARONE HYDROCHLORIDE SCH: 200 TABLET ORAL at 11:45

## 2019-12-09 RX ADMIN — MINOXIDIL SCH MG: 2.5 TABLET ORAL at 13:10

## 2019-12-09 RX ADMIN — NIFEDIPINE SCH: 90 TABLET, EXTENDED RELEASE ORAL at 11:46

## 2019-12-09 RX ADMIN — PANTOPRAZOLE SODIUM SCH: 40 TABLET, DELAYED RELEASE ORAL at 10:42

## 2019-12-09 NOTE — ANESTHESIA CONSULTATION
Anesthesia Consult and Med Hx


Date of service: 12/09/19





- Airway


Anesthetic Teeth Evaluation: Chipped


ROM Head & Neck: Adequate


Mental/Hyoid Distance: Adequate


Mallampati Class: Class II


Intubation Access Assessment: Probably Good





- Pre-Operative Health Status


ASA Pre-Surgery Classification: ASA3


Proposed Anesthetic Plan: MAC





- Cardiovascular System


Hx Hypertension: Yes (Negative stress test [id number] per pt)


Hx Heart Attack/AMI: No


Hx Cardia Arrhythmia: Yes (A-Fib)





- Central Nervous System


CVA: Yes (2012-denies residual)





- Gastrointestinal


Hx Gastroesophageal Reflux Disease: Yes





- Endocrine


Hx Renal Disease: Yes (HD TThS. Last HD [id number])


Hx End Stage Renal Disease: Yes





- Hematic


Hx Anemia: Yes





- Other Systems


Hx Alcohol Use: No


Hx Cancer: No

## 2019-12-09 NOTE — DISCHARGE SUMMARY
Providers





- Providers


Date of Admission: 


12/07/19 18:30





Date of discharge: 12/09/19


Attending physician: 


BEAN MAZARIEGOS





                                        





12/07/19 08:02


Consult to Physician [CONS] Routine 


   Comment: 


   Consulting Provider: ELISHA STEVE


   Physician Instructions: 


   Reason For Exam: ESRD, Dialysis





12/07/19 12:37


Consult to Physician [CONS] Routine 


   Comment: 


   Consulting Provider: MERLE RITTER


   Physician Instructions: 


   Reason For Exam: intractable n/v abd pains, anemia, on A/c











Primary care physician: 


PADMINI MACIAS








Hospitalization


Condition: Stable


Hospital course: 


Patient is a 50 yo man from [place], [place] with a history of ESRD on hemodialysis TTS,

 hypertension, AOCD and pAFib on Warfarin who presents to [facility] ED with 

unresolved epigastric abdominal pains/n/v and a report of clotted left upper 

extremity AV graft since patient discharge from here on Tuesday, Dec. 4th. 

During that admission he had a negative stress test. He was found to have pAFib 

and started on Amiodarone. The abdominal pains never resolved and it prevented 

him from going to hemodialysis on Thursday. He also having intractable n/v which

 never went away from last hospitalization, so he has not been taking his bp 

medications consistently; therefore, BP is uncontrolled. He denies any fever, 

nausea, vomiting, chest pain, shortness of breath.  He also has a past medical 

history of hypertension.  He is anti-coagulated on Coumadin. The patient also 

had some complaints of having some dark stool over the past few days.  Rectal 

examination was done that does not show any gross blood.  While there was not 

much stool for guaiac testing there was no obvious melena. Patient seen in HD 

center and the Left AVG is functioning normally. 





* pChest x-ray does not show any acute process. There are no pleural effusions, 

  obvious pneumonia and there is no pneumothorax.


* Abdominal x-ray shows nonspecific nonobstructive bowel gas


* 12/2/19 CT abd/pelvis without contrast Impression: No definite acute finding 

  appreciated within the limits of the exam. Extensive respiratory motion 

  artifact limits the exam, mild anasarca. 








Date:  12/09/2019 EGD


Pre procedure diagnosis:  abdominal pain, nausea/vomiting


Post procedure diagnosis: esophagitis, gastritis, gastric erosion, duodenitis


Procedure:  Esophagogastroduodenoscopy with biopsies


Endoscopist:  Merle Ritter MD


IMPRESSION: 


1. Mild duodenitis in the bulb. Biopsies obtained from the duodenum. 


2. Antral gastritis with nonbleeding small gastric erosions. Biopsies were 

obtained. 


3. Mild distal esophagitis. Biopsies were obtained from the GE junction. 


4. No signs of active bleeding noted. 


Plan:  


1. Resume renal diet. 


2. Continue with PPI and sucralfate. 


3. Follow up in GI clinic post discharge. Follow up on pathology results. 


4. No further GI recommendations. Ok for discharge per GI standpoint. Will sign 

off.  


4. Avoid NSAIDs. 








Discharge Diagnoses:


Intractable N/V/Epigastric Abd pains due to Mild duodenitis, antral gastritis 

and distal esophagitis: Treating w/ PPI and sucrafrate, d/w GI okay to resume 

a/c


ESRD, missed HD: needing Hemodialysis, Nephrology consulted, counseling on 

compliance


Malignant Hypertension: iv hydralazine not helping, restarted home medications 

which patient was noncompliance with. 


AOCD with drop in h/h: monitor closely, repeat levels, 


pAFib: continue amiodarone, hold warfarin due to the anemia and poor compliance


Noncompliance; counseling done


DVT ppx scd only


full code





Disposition: DC-01 TO HOME OR SELFCARE


Time spent for discharge: 35 minutes





Core Measure Documentation





- Palliative Care


Palliative Care/ Comfort Measures: Not Applicable





- Core Measures


Any of the following diagnoses?: none





- VTE Discharge Requirements


Deep Vein Thrombosis/Pulmonary Embolism Present on Admission: No


Has pt received <5 days of overlap therapy or INR<2.0: No


Anticoagulant overlap therapy prescribed at discharge: No


Contraindication No Overlap Therapy order at DC: Not Indicated





Exam





- Physical Exam


Narrative exam: 


Gen: WDWN, NAD, Awake, Alert, Orientated x 3


HEENT: NCAT, EOMI, PERRL, OP Clear 


Neck: supple, no adenopathy, no thyromegaly, no JVD 


CVS/Heart: RRR, normal S1S2, pulses present bilaterally 


Chest/Lungs: CTA B, Symmetrical chest expansion, good air entry bilaterally 


GI/Abdomen: soft, nondistended, mild epigastic tenderness, good bowel sounds, no

 guarding or rebound 


/Bladder: no suprapubic tenderness, no CVA or paraspinal tenderness 


Extermity/Skin: no c/c/e, no obvious rash 


MSK: FROM x 4 


Neuro: CN 2-12 grossly intact, no new focal deficits 


Psych: calm 





- Constitutional


Vitals: 


                                        











Temp Pulse Resp BP Pulse Ox


 


 98.5 F   89   16   173/100   96 


 


 12/09/19 11:54  12/09/19 13:08  12/09/19 11:54  12/09/19 13:08  12/09/19 11:54














Plan


Activity: other (no strenous activity until cleared by PCP)


Diet: renal, advance as tolerated


Follow up with: 


MERLE RITTER MD [Staff Physician] - 7 Days


ELISHA STEVE MD [Staff Physician] - 7 Days


MELINA MICHEL MD [Staff Physician] - 7 Days


Prescriptions: 


hydrALAZINE [Apresoline TAB] 100 mg PO TID #90 tab


Sucralfate [Carafate] 1 gm PO ACHS #90 tablet


cloNIDine-TTS PATCH [Catapres-Tts 0.3mg Patch] 1 patch TD Q7D #4 patch


Amiodarone [Cordarone 200 MG TAB] 200 mg PO QDAY #30 tablet


carvediloL [Coreg] 25 mg PO QDAY #30 tab


Warfarin [Coumadin] 7.5 mg PO QDAY #30 tablet


Minoxidil [Loniten] 2.5 mg PO QDAY #30 tablet


NIFEdipine XL [Procardia Xl] 90 mg PO QDAY #30 tablet


Pantoprazole [Protonix TAB] 40 mg PO QDAY #30 tablet

## 2019-12-09 NOTE — OPERATIVE REPORT
Operative Report


Operative Report: 





Date:  12/09/2019





Pre procedure diagnosis:  abdominal pain, nausea/vomiting





Post procedure diagnosis: esophagitis, gastritis, gastric erosion, duodenitis





Procedure:  Esophagogastroduodenoscopy with biopsies





Endoscopist:  Eddie Ritter MD





Medications:  Per anesthesia- see separate records for details





Complications: none





Estimated blood loss:  None





After careful discussion of the nature and purpose of the procedure, details of 

the technique, risks, benefits and alternatives, the patient gave consent.





The patient was placed in the left lateral decubitus position and medicated by 

anesthesia- see separate records for details.


 


The tip of the olympus video upper scope was passed per orum under direct view 

through the mouth and into the esophagus, stomach and duodenum.  The scope was 

advanced to the second portion of the duodenum without difficulty.


 


The second portion of the duodenum were normal.  The bulb revealed mild 

erythematous mucosa. Biopsies were obtained from the duodenum.  The scope was 

withdrawn back into the stomach and the stomach gently insufflated with air.  

The antrum revealed erythematous mucosa diffusely with small nonbleeding 

erosions. Biopsies were obtained. The scope was then retroflexed and partially 

withdrawn to inspect the proximal stomach.  The cardia, fundus and body were 

normal appearing.


The scope was then withdrawn in the forward view.  The EG junction was at 38 cm 

from the incisors.  The distal esophagus showed mild esophagitis LA grade A with

irregular z-line. Biopsies were obtained. Rest of the esophagus was normal.





The procedure was well tolerated and the patient was observed in the GI recovery

unit.





IMPRESSION: 


1. Mild duodenitis in the bulb. Biopsies obtained from the duodenum. 


2. Antral gastritis with nonbleeding small gastric erosions. Biopsies were 

obtained. 


3. Mild distal esophagitis. Biopsies were obtained from the GE junction. 


4. No signs of active bleeding noted. 





Plan:  


1. Resume renal diet. 


2. Continue with PPI and sucralfate. 


3. Follow up in GI clinic post discharge. Follow up on pathology results. 


4. No further GI recommendations. Ok for discharge per GI standpoint. Will sign 

off.  


4. Avoid NSAIDs. 





Eddie Ritter MD (Jenny)


[facility]

## 2019-12-09 NOTE — PROGRESS NOTE
Assessment and Plan





- Patient Problems


(1) End-stage renal disease needing dialysis


Current Visit: Yes   Status: Acute   


Plan to address problem: 


End stage renal disease


Continue hemodialysis Tuesday Thursday Saturday schedule








(2) Hypertensive urgency


Current Visit: Yes   Status: Acute   


Plan to address problem: 


Hypertensive urgency


Continue medications


Ultrafiltration with dialysis








(3) Hyponatremia


Current Visit: Yes   Status: Acute   


Plan to address problem: 


Hyponatremia mild


Initial fluid restriction








(4) Anemia


Current Visit: Yes   Status: Acute   


Plan to address problem: 


Moderate anemia


Hemoglobin 9.2 g per DL


Monitor CBC








Subjective


Interval history: 


49-year-old with end-stage renal disease on hemodialysis Tuesday Thursday S

aturday schedule hypertension admitted with concern for bloody stools


Patient seen today


Next hemodialysis in the a.m. has had elevated blood pressures blood pressure 

medications given by the nurse





Objective





- Vital Signs


Vital signs: 


                               Vital Signs - 12hr











  12/09/19 12/09/19 12/09/19





  05:08 06:03 10:16


 


Temperature 98.4 F  98.6 F


 


Pulse Rate 90 90 88


 


Respiratory 18  18





Rate   


 


Blood Pressure 148/84 148/84 163/94


 


O2 Sat by Pulse 96  97





Oximetry   














  12/09/19 12/09/19 12/09/19





  10:39 10:54 11:09


 


Temperature 97.7 F  


 


Pulse Rate 85 85 85


 


Respiratory 14 14 25 H





Rate   


 


Blood Pressure 137/74 150/85 15/86


 


O2 Sat by Pulse 95 100 100





Oximetry   














  12/09/19 12/09/19





  11:54 13:08


 


Temperature 98.5 F 


 


Pulse Rate 89 89


 


Respiratory 16 





Rate  


 


Blood Pressure 173/100 173/100


 


O2 Sat by Pulse 96 





Oximetry  














- General Appearance


General appearance: well-developed, well-nourished


EENT: ATNC, PERRL


Neck: no JVD


Respiratory: Present: Clear to Ascultation


Cardiology: regular, S1S2


Gastrointestinal: normal, normoactive bowel sounds


Integumentary: no rash


Neurologic: alert and oriented x3, CN 3-12 intact


Psychiatric: mood/affect appropriate





- Lab





                                 12/08/19 09:22





                                 12/09/19 06:26


                             Most recent lab results











Calcium  9.4 mg/dL (8.4-10.2)   12/09/19  06:26    














- Imaging


Chest x-ray: image reviewed (I reviewed chest x-ray)





Medications & Allergies





- Medications


Allergies/Adverse Reactions: 


                                    Allergies





No Known Allergies Allergy (Unverified 01/16/17 08:22)


   








Home Medications: 


                                Home Medications











 Medication  Instructions  Recorded  Confirmed  Last Taken  Type


 


Amiodarone [Cordarone 200 MG TAB] 200 mg PO QDAY #30 tablet 12/09/19  Unknown Rx


 


Epoetin Amadeo 10,000 Unit [Procrit] 10,000 unit IV QUITA PRN #1 vial 12/09/19  

Unknown Rx


 


Minoxidil [Loniten] 2.5 mg PO QDAY #30 tablet 12/09/19  Unknown Rx


 


NIFEdipine XL [Procardia Xl] 90 mg PO QDAY #30 tablet 12/09/19  Unknown Rx


 


Pantoprazole [Protonix TAB] 40 mg PO QDAY #30 tablet 12/09/19  Unknown Rx


 


Sucralfate [Carafate] 1 gm PO ACHS #90 tablet 12/09/19  Unknown Rx


 


Warfarin [Coumadin] 7.5 mg PO QDAY #30 tablet 12/09/19  Unknown Rx


 


carvediloL [Coreg] 25 mg PO QDAY #30 tab 12/09/19  Unknown Rx


 


cloNIDine-TTS PATCH [Catapres-Tts 1 patch TD Q7D #4 patch 12/09/19  Unknown Rx





0.3mg Patch]     


 


hydrALAZINE [Apresoline TAB] 100 mg PO TID #90 tab 12/09/19  Unknown Rx











Active Medications: 














Generic Name Dose Route Start Last Admin





  Trade Name Freq  PRN Reason Stop Dose Admin


 


Amiodarone HCl  200 mg  12/07/19 13:00  12/09/19 13:11





  Cordarone  PO   200 mg





  QDAY ARRON   Administration


 


Carvedilol  12.5 mg  12/07/19 22:00  12/09/19 11:45





  Coreg  PO   Not Given





  BID ARRON  


 


Clonidine HCl  0.3 mg  12/07/19 13:00  12/07/19 14:42





  Catapres-Tts Patch  TD   0.3 mg





  Q7D ARRON   Administration


 


Epoetin Amadeo  10,000 unit  12/08/19 16:27 





  Procrit  IV  





  QUITA PRN  





  hemodialysis  


 


Hydralazine HCl  50 mg  12/07/19 14:00  12/09/19 13:08





  Apresoline  PO   50 mg





  Q8HR ARRON   Administration


 


Sodium Chloride  100 mls @ 999 mls/hr  12/07/19 08:35 





  Nacl 0.9%  IV  





  QUITA PRN  





  Hypotension  


 


Sodium Chloride  1,000 mls @ 50 mls/hr  12/09/19 09:30 





  Nacl 0.9% 1000 Ml  IV  





  AS DIRECT ARRON  


 


Minoxidil  2.5 mg  12/07/19 13:00  12/09/19 13:10





  Loniten  PO   2.5 mg





  QDAY ARRON   Administration


 


Nifedipine  90 mg  12/07/19 13:00  12/09/19 13:10





  Procardia Xl  PO   90 mg





  QDAY ARRON   Administration


 


Pantoprazole Sodium  40 mg  12/08/19 10:00  12/09/19 13:11





  Protonix  PO   40 mg





  QDAY ARRON   Administration


 


Sucralfate  1 gm  12/07/19 16:30  12/09/19 13:08





  Carafate  PO   1 gm





  ACHS ARRON   Administration

## 2019-12-18 ENCOUNTER — HOSPITAL ENCOUNTER (OUTPATIENT)
Dept: HOSPITAL 5 - ED | Age: 49
Setting detail: OBSERVATION
LOS: 2 days | Discharge: HOME | End: 2019-12-20
Attending: INTERNAL MEDICINE | Admitting: INTERNAL MEDICINE
Payer: MEDICARE

## 2019-12-18 DIAGNOSIS — I48.20: ICD-10-CM

## 2019-12-18 DIAGNOSIS — D50.0: ICD-10-CM

## 2019-12-18 DIAGNOSIS — Z79.01: ICD-10-CM

## 2019-12-18 DIAGNOSIS — N25.81: ICD-10-CM

## 2019-12-18 DIAGNOSIS — Z99.2: ICD-10-CM

## 2019-12-18 DIAGNOSIS — I12.0: ICD-10-CM

## 2019-12-18 DIAGNOSIS — R07.89: Primary | ICD-10-CM

## 2019-12-18 DIAGNOSIS — N18.6: ICD-10-CM

## 2019-12-18 DIAGNOSIS — J81.1: ICD-10-CM

## 2019-12-18 DIAGNOSIS — I48.0: ICD-10-CM

## 2019-12-18 DIAGNOSIS — E87.2: ICD-10-CM

## 2019-12-18 PROCEDURE — 80048 BASIC METABOLIC PNL TOTAL CA: CPT

## 2019-12-18 PROCEDURE — 71045 X-RAY EXAM CHEST 1 VIEW: CPT

## 2019-12-18 PROCEDURE — 87116 MYCOBACTERIA CULTURE: CPT

## 2019-12-18 PROCEDURE — 84484 ASSAY OF TROPONIN QUANT: CPT

## 2019-12-18 PROCEDURE — 96374 THER/PROPH/DIAG INJ IV PUSH: CPT

## 2019-12-18 PROCEDURE — 82553 CREATINE MB FRACTION: CPT

## 2019-12-18 PROCEDURE — 36415 COLL VENOUS BLD VENIPUNCTURE: CPT

## 2019-12-18 PROCEDURE — 93010 ELECTROCARDIOGRAM REPORT: CPT

## 2019-12-18 PROCEDURE — 96375 TX/PRO/DX INJ NEW DRUG ADDON: CPT

## 2019-12-18 PROCEDURE — 85025 COMPLETE CBC W/AUTO DIFF WBC: CPT

## 2019-12-18 PROCEDURE — 85730 THROMBOPLASTIN TIME PARTIAL: CPT

## 2019-12-18 PROCEDURE — 93005 ELECTROCARDIOGRAM TRACING: CPT

## 2019-12-18 PROCEDURE — 99284 EMERGENCY DEPT VISIT MOD MDM: CPT

## 2019-12-18 PROCEDURE — G0257 UNSCHED DIALYSIS ESRD PT HOS: HCPCS

## 2019-12-18 PROCEDURE — 80061 LIPID PANEL: CPT

## 2019-12-18 PROCEDURE — 93306 TTE W/DOPPLER COMPLETE: CPT

## 2019-12-18 PROCEDURE — 85610 PROTHROMBIN TIME: CPT

## 2019-12-18 PROCEDURE — G0378 HOSPITAL OBSERVATION PER HR: HCPCS

## 2019-12-18 PROCEDURE — 82550 ASSAY OF CK (CPK): CPT

## 2019-12-19 LAB
APTT BLD: 40.4 SEC. (ref 24.2–36.6)
BASOPHILS # (AUTO): 0.1 K/MM3 (ref 0–0.1)
BASOPHILS NFR BLD AUTO: 1.4 % (ref 0–1.8)
BUN SERPL-MCNC: 70 MG/DL (ref 9–20)
BUN/CREAT SERPL: 5 %
CALCIUM SERPL-MCNC: 9.5 MG/DL (ref 8.4–10.2)
EOSINOPHIL # BLD AUTO: 0.8 K/MM3 (ref 0–0.4)
EOSINOPHIL NFR BLD AUTO: 10.9 % (ref 0–4.3)
HCT VFR BLD CALC: 25.3 % (ref 35.5–45.6)
HDLC SERPL-MCNC: 70 MG/DL (ref 40–59)
HEMOLYSIS INDEX: 3
HGB BLD-MCNC: 8.2 GM/DL (ref 11.8–15.2)
INR PPP: 2.21 (ref 0.87–1.13)
LYMPHOCYTES # BLD AUTO: 1.2 K/MM3 (ref 1.2–5.4)
LYMPHOCYTES NFR BLD AUTO: 15.1 % (ref 13.4–35)
MCHC RBC AUTO-ENTMCNC: 33 % (ref 32–34)
MCV RBC AUTO: 91 FL (ref 84–94)
MONOCYTES # (AUTO): 0.7 K/MM3 (ref 0–0.8)
MONOCYTES % (AUTO): 8.6 % (ref 0–7.3)
PLATELET # BLD: 223 K/MM3 (ref 140–440)
RBC # BLD AUTO: 2.78 M/MM3 (ref 3.65–5.03)

## 2019-12-19 RX ADMIN — HYDRALAZINE HYDROCHLORIDE SCH MG: 100 TABLET, FILM COATED ORAL at 20:59

## 2019-12-19 RX ADMIN — SUCRALFATE SCH: 1 TABLET ORAL at 07:30

## 2019-12-19 RX ADMIN — SUCRALFATE SCH GM: 1 TABLET ORAL at 07:48

## 2019-12-19 RX ADMIN — SUCRALFATE SCH GM: 1 TABLET ORAL at 18:09

## 2019-12-19 RX ADMIN — Medication SCH: at 10:00

## 2019-12-19 RX ADMIN — Medication SCH ML: at 21:00

## 2019-12-19 RX ADMIN — DOCUSATE SODIUM SCH MG: 100 CAPSULE, LIQUID FILLED ORAL at 20:59

## 2019-12-19 RX ADMIN — NIFEDIPINE SCH MG: 90 TABLET, EXTENDED RELEASE ORAL at 07:48

## 2019-12-19 RX ADMIN — SUCRALFATE SCH GM: 1 TABLET ORAL at 20:59

## 2019-12-19 RX ADMIN — HYDRALAZINE HYDROCHLORIDE SCH: 100 TABLET, FILM COATED ORAL at 14:00

## 2019-12-19 RX ADMIN — HYDRALAZINE HYDROCHLORIDE SCH MG: 100 TABLET, FILM COATED ORAL at 08:48

## 2019-12-19 RX ADMIN — MINOXIDIL SCH MG: 2.5 TABLET ORAL at 18:10

## 2019-12-19 RX ADMIN — DOCUSATE SODIUM SCH: 100 CAPSULE, LIQUID FILLED ORAL at 10:00

## 2019-12-19 RX ADMIN — SUCRALFATE SCH: 1 TABLET ORAL at 11:30

## 2019-12-19 RX ADMIN — PANTOPRAZOLE SODIUM SCH MG: 40 TABLET, DELAYED RELEASE ORAL at 18:10

## 2019-12-19 NOTE — EVENT NOTE
Date: 12/19/19


Patient with ESRD on dialysis presents with missed hemodialysis, chest pain. I 

have seen and examined him. Dialysis today.

## 2019-12-19 NOTE — CONSULTATION
History of Present Illness





- Reason for Consult


Consult date: 12/19/19


end stage renal disease





- History of Present Illness





This is a 49 year old man with ESRD who presents with shortness of breath. He 

was last dialyzed on Saturday 12/14.  He had an access issue which was fixed on 

12/17/19 but was unable to make it to dialysis.  He began getting short of 

breath last night and came to ED.  No recent issues with HD, with no cramping or

dizziness.





Seen on HD this AM.  No issues noted.  No dyspnea currently.  No chest pain.  No

abdominal pain.  No nausea, vomiting. 





Past History


Past Medical History: atrial fib (on Coumadin ), ESRD (T/Th/S), hypertension


Past Surgical History: Other (L AV Fistula)


Social history: lives with family, full code.  denies: smoking


Family history: no significant family history





Medications and Allergies


                                    Allergies











Allergy/AdvReac Type Severity Reaction Status Date / Time


 


No Known Allergies Allergy   Unverified 01/16/17 08:22











                                Home Medications











 Medication  Instructions  Recorded  Confirmed  Last Taken  Type


 


Amiodarone [Cordarone 200 MG TAB] 200 mg PO QDAY #30 tablet 12/09/19 12/19/19 

Unknown Rx


 


Epoetin Amadeo 10,000 Unit [Procrit] 10,000 unit IV QUITA PRN #1 vial 12/09/19 12/19/19 Unknown Rx


 


Minoxidil [Loniten] 2.5 mg PO QDAY #30 tablet 12/09/19 12/19/19 Unknown Rx


 


NIFEdipine XL [Procardia Xl] 90 mg PO QDAY #30 tablet 12/09/19 12/19/19 Unknown 

Rx


 


Pantoprazole [Protonix TAB] 40 mg PO QDAY #30 tablet 12/09/19 12/19/19 Unknown 

Rx


 


Sucralfate [Carafate] 1 gm PO ACHS #90 tablet 12/09/19 12/19/19 Unknown Rx


 


Warfarin [Coumadin] 7.5 mg PO QDAY #30 tablet 12/09/19 12/19/19 Unknown Rx


 


carvediloL [Coreg] 25 mg PO QDAY #30 tab 12/09/19 12/19/19 Unknown Rx


 


cloNIDine-TTS PATCH [Catapres-Tts 1 patch TD Q7D #4 patch 12/09/19 12/19/19 

Unknown Rx





0.3mg Patch]     


 


hydrALAZINE [Apresoline TAB] 100 mg PO TID #90 tab 12/09/19 12/19/19 Unknown Rx


 


Cinacalcet 30 mg PO DAILY 12/19/19 12/19/19 Unknown History


 


Furosemide 80 mg PO DAILY 12/19/19 12/19/19 Unknown History











Active Meds: 


Active Medications





Acetaminophen (Tylenol)  650 mg PO Q4H PRN


   PRN Reason: Pain MILD(1-3)/Fever >100.5/HA


Amiodarone HCl (Cordarone)  200 mg PO QDAY [facility]


Carvedilol (Coreg)  25 mg PO QDAY [facility]


Clonidine HCl (Catapres-Tts Patch)  0.3 mg TD Th [facility]


Diphenhydramine HCl (Benadryl)  25 mg IV Q6H PRN


   PRN Reason: Itching


Docusate Sodium (Colace)  100 mg PO BID [facility]


   Last Admin: 12/19/19 10:00 Dose:  Not Given


   Documented by: 


Epoetin Amadeo (Procrit)  10,000 unit IV QUITA PRN


   PRN Reason: hemodialysis


Hydralazine HCl (Apresoline)  100 mg PO TID [facility]


   Last Admin: 12/19/19 08:48 Dose:  100 mg


   Documented by: 


Sodium Chloride (Nacl 0.9%)  100 mls @ 999 mls/hr IV QUITA PRN


   PRN Reason: Hypotension


Minoxidil (Loniten)  2.5 mg PO QDAY [facility]


Nifedipine (Procardia Xl)  90 mg PO QDAY@0600 [facility]


   Last Admin: 12/19/19 07:48 Dose:  90 mg


   Documented by: 


Nitroglycerin (Nitrostat)  0.4 mg SL .Q5MIN PRN


   PRN Reason: Chest Pain


Ondansetron HCl (Zofran)  4 mg IV Q6H PRN


   PRN Reason: Nausea And Vomiting


Oxycodone/Acetaminophen (Percocet 5/325)  1 tab PO Q6H PRN


   PRN Reason: Pain, Moderate (4-6)


Pantoprazole Sodium (Protonix)  40 mg PO QDAY [facility]


Sodium Chloride (Sodium Chloride Flush Syringe 10 Ml)  10 ml IV BID [facility]


   Last Admin: 12/19/19 10:00 Dose:  Not Given


   Documented by: 


Sodium Chloride (Sodium Chloride Flush Syringe 10 Ml)  10 ml IV PRN PRN


   PRN Reason: LINE FLUSH


Sucralfate (Carafate)  1 gm PO ACHS [facility]


   Last Admin: 12/19/19 11:30 Dose:  Not Given


   Documented by: 


Warfarin Sodium (Coumadin)  7.5 mg PO DAILY@1700 [facility]











Review of Systems


Constitutional: no fever, no fatigue


Ears, nose, mouth and throat: no ear pain


Cardiovascular: no chest pain, no orthopnea, no dyspnea on exertion


Respiratory: no cough, no shortness of breath, no dyspnea on exertion, no 

congestion


Gastrointestinal: no abdominal pain, no nausea, no vomiting, no diarrhea


Genitourinary Male: no dysuria


Musculoskeletal: no neck stiffness, no hot joints


Integumentary: no rash


Neurological: no headaches


Psychiatric: no anxiety





Exam





- Vital Signs


Vital signs: 


                                   Vital Signs











Pulse Resp Pulse Ox


 


 82   19   96 


 


 12/19/19 02:31  12/19/19 02:31  12/19/19 02:31











- General Appearance


General appearance: well-developed, well-nourished, appears stated age


EENT: PERRL, mucous membranes moist


Neck: Present: neck supple, trachea midline.  Absent: JVD/HJR, Masses


Respiratory: Clear to Ascultation


Heart: regular, S1S2


Gastrointestinal: Present: normoactive bowel sounds.  Absent: tenderness


Integumentary: no rash, warm and dry


Neurologic: no focal deficit, no asterixis, alert and oriented x3


Psychiatric: mood/affect appropriate





Results





- Lab Results





                                 12/19/19 00:02





                                 12/19/19 00:02


                             Most recent lab results











Calcium  9.5 mg/dL (8.4-10.2)   12/19/19  00:02    














Assessment and Plan





# ESRD: will provide HD today for shortness of breath.  Continue HD Tu/Th/Sa or 

prn based on labs.


- avoid nephrotoxins


- daily labs


- renal diet


- renally dose meds





# Anemia: JANELLE with HD prn





# HTN: BP high.  UF as tolerated, dyspnea improving with HD. Continue home meds 

post HD





# Acidosis: HD as above





# Secondary Hyperparathyroidism: continue home binders

## 2019-12-19 NOTE — EMERGENCY DEPARTMENT REPORT
ED Chest Pain HPI





- General


Chief Complaint: Chest Pain


Stated Complaint: CHEST PAIN/ROSALBA


Time Seen by Provider: 12/19/19 02:54


Source: patient


Mode of arrival: Ambulatory


Limitations: No Limitations





- History of Present Illness


Initial Comments: 





49-year-old male with a past medical history end-stage renal disease on dialysis

TTS, hypertension, and A. fib on Coumadin presents to the hospital complaining 

of chest pain that started yesterday evening.  Patient complains of a constant 

moderate tightness across his chest with shortness of breath.  Pain is worse 

with movement and palpation.  He denies cough or fever.  He also complains of 

lower extremity edema.  Last dialysis was Saturday, December 14.  Patient missed

his Tuesday dialysis because his access was not working.  Patient had a 

procedure on the 18th to fix his access was not able to wait for his dialysis on

the 19th due to his symptoms.  medical record reviewed.  Patient has been 

admitted here twice this month.  He had a negative stress test December 2.


Severity scale (0 -10): 8





- Related Data


                                  Previous Rx's











 Medication  Instructions  Recorded  Last Taken  Type


 


Amiodarone [Cordarone 200 MG TAB] 200 mg PO QDAY #30 tablet 12/09/19 Unknown Rx


 


Epoetin Amadeo 10,000 Unit [Procrit] 10,000 unit IV QUITA PRN #1 vial 12/09/19 

Unknown Rx


 


Minoxidil [Loniten] 2.5 mg PO QDAY #30 tablet 12/09/19 Unknown Rx


 


NIFEdipine XL [Procardia Xl] 90 mg PO QDAY #30 tablet 12/09/19 Unknown Rx


 


Pantoprazole [Protonix TAB] 40 mg PO QDAY #30 tablet 12/09/19 Unknown Rx


 


Sucralfate [Carafate] 1 gm PO ACHS #90 tablet 12/09/19 Unknown Rx


 


Warfarin [Coumadin] 7.5 mg PO QDAY #30 tablet 12/09/19 Unknown Rx


 


carvediloL [Coreg] 25 mg PO QDAY #30 tab 12/09/19 Unknown Rx


 


cloNIDine-TTS PATCH [Catapres-Tts 1 patch TD Q7D #4 patch 12/09/19 Unknown Rx





0.3mg Patch]    


 


hydrALAZINE [Apresoline TAB] 100 mg PO TID #90 tab 12/09/19 Unknown Rx











                                    Allergies











Allergy/AdvReac Type Severity Reaction Status Date / Time


 


No Known Allergies Allergy   Unverified 01/16/17 08:22














Heart Score





- HEART Score


History: Slightly suspicious


EKG: Normal


Age: 45-65


Risk factors: > 3 risk factors or hx of atherosclerotic disease


Troponin: 1-3x normal limit


HEART Score: 4





ED Review of Systems


ROS: 


Stated complaint: CHEST PAIN/ROSALBA


Other details as noted in HPI





Comment: All other systems reviewed and negative





ED Past Medical Hx





- Past Medical History


Previous Medical History?: Yes


Hx Hypertension: Yes (Negative stress test [id number] per pt)


Hx Heart Attack/AMI: No


Hx Renal Disease: Yes (HD TThS. Last HD [id number])





- Surgical History


Additional Surgical History: Left A/V Graft





- Social History


Smoking Status: Never Smoker





- Medications


Home Medications: 


                                Home Medications











 Medication  Instructions  Recorded  Confirmed  Last Taken  Type


 


Amiodarone [Cordarone 200 MG TAB] 200 mg PO QDAY #30 tablet 12/09/19  Unknown Rx


 


Epoetin Amadeo 10,000 Unit [Procrit] 10,000 unit IV QUITA PRN #1 vial 12/09/19  Unk

nown Rx


 


Minoxidil [Loniten] 2.5 mg PO QDAY #30 tablet 12/09/19  Unknown Rx


 


NIFEdipine XL [Procardia Xl] 90 mg PO QDAY #30 tablet 12/09/19  Unknown Rx


 


Pantoprazole [Protonix TAB] 40 mg PO QDAY #30 tablet 12/09/19  Unknown Rx


 


Sucralfate [Carafate] 1 gm PO ACHS #90 tablet 12/09/19  Unknown Rx


 


Warfarin [Coumadin] 7.5 mg PO QDAY #30 tablet 12/09/19  Unknown Rx


 


carvediloL [Coreg] 25 mg PO QDAY #30 tab 12/09/19  Unknown Rx


 


cloNIDine-TTS PATCH [Catapres-Tts 1 patch TD Q7D #4 patch 12/09/19  Unknown Rx





0.3mg Patch]     


 


hydrALAZINE [Apresoline TAB] 100 mg PO TID #90 tab 12/09/19  Unknown Rx














ED Physical Exam





- General


Limitations: No Limitations





- Other


Other exam information: 





General: No acute distress


Head: Atraumatic


Eyes: normal appearance


ENT: Moist mucous membranes


Neck: Normal appearance, no midline tenderness


Chest: Clear to auscultation bilaterally


CV: Regular rate and rhythm, reproducible upper chest wall tenderness to 

palpation, systolic murmur


Abdomen: Soft, normal bowel sounds, nontender, nondistended, no rebound or 

guarding


Back: Normal inspection


Extremity: Normal inspection infection, full range of motion, lower extremity 

edema.  Left arm AV access with positive thrill


Neuro: Alert O x 3, no facial asymmetry, speech clear, no gross motor sensory 

deficit


Psych: Appropriate behavior


Skin: No rash





ED Course


                                   Vital Signs











  12/19/19 12/19/19 12/19/19





  02:31 02:41 02:45


 


Temperature   


 


Pulse Rate 82  94 H


 


Respiratory 19  19





Rate   


 


Blood Pressure   146/83


 


Blood Pressure  168/88 





[Left]   


 


O2 Sat by Pulse 96  95





Oximetry   














  12/19/19 12/19/19





  02:57 03:01


 


Temperature 98.2 F 


 


Pulse Rate  94 H


 


Respiratory  21





Rate  


 


Blood Pressure  147/74


 


Blood Pressure  





[Left]  


 


O2 Sat by Pulse  95





Oximetry  














- Consultations


Consultation #1: 





12/19/19 03:52


case dw Dr Pino nephrologist, will admit pt for dialysis in am. 





NALDO score





- Naldo Score


Age > 65: (0) No


Aspirin use within the Past 7 Days: (0) No


3 or more CAD Risk Factors: (1) Yes


2 or more Angina events in past 24 hrs: (1) Yes


Known CAD with more than 50% Stenosis: (0) No


Elevated Cardiac Markers: (1) Yes


ST Deviation Greater than 0.5mm: (0) No


NALDO Score: 3





ED Medical Decision Making





- Lab Data


Result diagrams: 


                                 12/19/19 00:02





                                 12/19/19 00:02








                                   Lab Results











  12/19/19 12/19/19 12/19/19 Range/Units





  00:02 00:02 00:37 


 


WBC  7.8    (4.5-11.0)  K/mm3


 


RBC  2.78 L    (3.65-5.03)  M/mm3


 


Hgb  8.2 L    (11.8-15.2)  gm/dl


 


Hct  25.3 L    (35.5-45.6)  %


 


MCV  91    (84-94)  fl


 


MCH  30    (28-32)  pg


 


MCHC  33    (32-34)  %


 


RDW  19.2 H    (13.2-15.2)  %


 


Plt Count  223    (140-440)  K/mm3


 


Lymph % (Auto)  15.1    (13.4-35.0)  %


 


Mono % (Auto)  8.6 H    (0.0-7.3)  %


 


Eos % (Auto)  10.9 H    (0.0-4.3)  %


 


Baso % (Auto)  1.4    (0.0-1.8)  %


 


Lymph #  1.2    (1.2-5.4)  K/mm3


 


Mono #  0.7    (0.0-0.8)  K/mm3


 


Eos #  0.8 H    (0.0-0.4)  K/mm3


 


Baso #  0.1    (0.0-0.1)  K/mm3


 


Seg Neutrophils %  64.0    (40.0-70.0)  %


 


Seg Neutrophils #  5.0    (1.8-7.7)  K/mm3


 


PT    25.0 H  (12.2-14.9)  Sec.


 


INR    2.21 H  (0.87-1.13)  


 


APTT    40.4 H  (24.2-36.6)  Sec.


 


Sodium   137   (137-145)  mmol/L


 


Potassium   4.5   (3.6-5.0)  mmol/L


 


Chloride   101.5   ()  mmol/L


 


Carbon Dioxide   17 L   (22-30)  mmol/L


 


Anion Gap   23   mmol/L


 


BUN   70 H   (9-20)  mg/dL


 


Creatinine   13.2 H   (0.8-1.5)  mg/dL


 


Estimated GFR   5   ml/min


 


BUN/Creatinine Ratio   5   %


 


Glucose   104 H   ()  mg/dL


 


Calcium   9.5   (8.4-10.2)  mg/dL


 


Troponin T   0.055 H   (0.00-0.029)  ng/mL


 


Triglycerides   130   (2-149)  mg/dL


 


Cholesterol   172   ()  mg/dL


 


LDL Cholesterol Direct   85   ()  mg/dL


 


HDL Cholesterol   70 H   (40-59)  mg/dL


 


Cholesterol/HDL Ratio   2.45   %














  12/19/19 Range/Units





  02:59 


 


WBC   (4.5-11.0)  K/mm3


 


RBC   (3.65-5.03)  M/mm3


 


Hgb   (11.8-15.2)  gm/dl


 


Hct   (35.5-45.6)  %


 


MCV   (84-94)  fl


 


MCH   (28-32)  pg


 


MCHC   (32-34)  %


 


RDW   (13.2-15.2)  %


 


Plt Count   (140-440)  K/mm3


 


Lymph % (Auto)   (13.4-35.0)  %


 


Mono % (Auto)   (0.0-7.3)  %


 


Eos % (Auto)   (0.0-4.3)  %


 


Baso % (Auto)   (0.0-1.8)  %


 


Lymph #   (1.2-5.4)  K/mm3


 


Mono #   (0.0-0.8)  K/mm3


 


Eos #   (0.0-0.4)  K/mm3


 


Baso #   (0.0-0.1)  K/mm3


 


Seg Neutrophils %   (40.0-70.0)  %


 


Seg Neutrophils #   (1.8-7.7)  K/mm3


 


PT   (12.2-14.9)  Sec.


 


INR   (0.87-1.13)  


 


APTT   (24.2-36.6)  Sec.


 


Sodium   (137-145)  mmol/L


 


Potassium   (3.6-5.0)  mmol/L


 


Chloride   ()  mmol/L


 


Carbon Dioxide   (22-30)  mmol/L


 


Anion Gap   mmol/L


 


BUN   (9-20)  mg/dL


 


Creatinine   (0.8-1.5)  mg/dL


 


Estimated GFR   ml/min


 


BUN/Creatinine Ratio   %


 


Glucose   ()  mg/dL


 


Calcium   (8.4-10.2)  mg/dL


 


Troponin T  0.055 H  (0.00-0.029)  ng/mL


 


Triglycerides   (2-149)  mg/dL


 


Cholesterol   ()  mg/dL


 


LDL Cholesterol Direct   ()  mg/dL


 


HDL Cholesterol   (40-59)  mg/dL


 


Cholesterol/HDL Ratio   %














- EKG Data


-: EKG Interpreted by Me


EKG shows normal: sinus rhythm, ST-T waves (no stemi)


Rate: normal





- Radiology Data


Radiology results: report reviewed





CHEST 1 VIEW INDICATION / CLINICAL INFORMATION: Chest Pain. COMPARISON: 

12/2/2019 FINDINGS: SUPPORT DEVICES: None. HEART / MEDIASTINUM: Moderately 

enlarged but stable. LUNGS / PLEURA: There is now extensive bilateral perihilar 

and lower lobe lung consolidation suggesting pulmonary edema. Mild venous 

congestion is also seen. No effusions are seen. No pneumoth orax. ADDITIONAL 

FINDINGS: No significant additional findings. IMPRESSION: 1 Significant interval

 worsening likely pulmonary edema. 





- Medical Decision Making





Patient has reproducible chest wall tenderness.  Low suspicion for CAD given 

negative stress tests earlier this month.  Chronic troponin elevation with 

unchanged upon repeat testing.  Patient is anticoagulated on Coumadin therefore 

low suspicious for pulmonary embolism.  Shortness of breath secondary to 

pulmonary edema and missing dialysis.  Case discussed with nephrologist who will

 arrange for dialysis in a.m.  Patient provided 0.5 mg of Dilaudid and 4 Zofran 

for pain.  Patient has a history of chronic A. fib and is currently in sinus 

rhythm on EKG.





- Differential Diagnosis


MI, unstable angina, pulmonary embolism, pulmonary edema, MSK pain


Critical Care Time: No


Critical care attestation.: 


If time is entered above; I have spent that time in minutes in the direct care 

of this critically ill patient, excluding procedure time.








ED Disposition


Clinical Impression: 


 Chest wall pain, End-stage renal disease needing dialysis, Pulmonary edema, 

Chronic atrial fibrillation, Anticoagulated on Coumadin





Disposition: DC-09 OP ADMIT IP TO THIS HOSP


Is pt being admited?: Yes


Condition: Stable


Referrals: 


PRIMARY CARE,MD [Primary Care Provider] - 3-5 Days


Time of Disposition: 04:08 (Dr Chavez/hosp)

## 2019-12-19 NOTE — HISTORY AND PHYSICAL REPORT
<LELO CAICEDO - Last Filed: 12/19/19 04:54>





History of Present Illness


Date of examination: 12/19/19


Date of admission: 


12/19/2019


Chief complaint: 





Missed HD , and CP


History of present illness: 





49-year-old -American male with history of ESRD on HD, hypertension, A. 

fib on anticoagulation who presents to [facility] ED with complaints of chest pain and

missed HD session.  Patient states that he's been experiencing intermittent 

chest pain for the past day.  He rates the pain 5/10.  There are no aggravating 

or relieving factors.  Patient complains of medicine Tuesdays (12/17) HD session

due to malfunction of AV fistula.  AV fistula was repaired but his next 

scheduled HD session at outpatient clinic is not until 12/19/19.  Patient states

he was unable to wait for clinic to open up to be dialyzed.  He states that his 

ankles are swollen and he is becoming SOB with exertion.  Review of medical 

records shows patient was admitted on 12/2/19 with similar complaints. Pt had 

thallium stress test during that admission which was negative.  Denies nausea, 

vomiting, diaphoresis, headache, fever, or compliance with medications.








Past History


Past Medical History: atrial fib (on Coumadin ), ESRD (T/Th/S), hypertension


Past Surgical History: Other (L AV Fistula)


Social history: lives with family, full code.  denies: smoking


Family history: no significant family history





Medications and Allergies


                                    Allergies











Allergy/AdvReac Type Severity Reaction Status Date / Time


 


No Known Allergies Allergy   Unverified 01/16/17 08:22











                                Home Medications











 Medication  Instructions  Recorded  Confirmed  Last Taken  Type


 


Amiodarone [Cordarone 200 MG TAB] 200 mg PO QDAY #30 tablet 12/09/19 12/19/19 

Unknown Rx


 


Epoetin Amadeo 10,000 Unit [Procrit] 10,000 unit IV QUITA PRN #1 vial 12/09/19 12/19/19 Unknown Rx


 


Minoxidil [Loniten] 2.5 mg PO QDAY #30 tablet 12/09/19 12/19/19 Unknown Rx


 


NIFEdipine XL [Procardia Xl] 90 mg PO QDAY #30 tablet 12/09/19 12/19/19 Unknown 

Rx


 


Pantoprazole [Protonix TAB] 40 mg PO QDAY #30 tablet 12/09/19 12/19/19 Unknown 

Rx


 


Sucralfate [Carafate] 1 gm PO ACHS #90 tablet 12/09/19 12/19/19 Unknown Rx


 


Warfarin [Coumadin] 7.5 mg PO QDAY #30 tablet 12/09/19 12/19/19 Unknown Rx


 


carvediloL [Coreg] 25 mg PO QDAY #30 tab 12/09/19 12/19/19 Unknown Rx


 


cloNIDine-TTS PATCH [Catapres-Tts 1 patch TD Q7D #4 patch 12/09/19 12/19/19 

Unknown Rx





0.3mg Patch]     


 


hydrALAZINE [Apresoline TAB] 100 mg PO TID #90 tab 12/09/19 12/19/19 Unknown Rx


 


Cinacalcet 30 mg PO DAILY 12/19/19 12/19/19 Unknown History


 


Furosemide 80 mg PO DAILY 12/19/19 12/19/19 Unknown History














Review of Systems


All systems: negative


Cardiovascular: chest pain, edema (ankle ), dyspnea on exertion


Respiratory: dyspnea on exertion





Exam





- Physical Exam


Narrative exam: 





Physical exam





General appearance: Present: No acute distress, alert and oriented 3,   

American adult male 





- EENT


Eyes: Present: PERRL, EOM intact


ENT: hearing intact, normal dentition





- Neck


Neck: Present: supple, normal ROM





- Respiratory


Respiratory effort: Non-labored


Respiratory: Clear throughout





- Cardiovascular


Heart rate: 90 (bpm)


Rhythm: Sinus rhythm


Heart Sounds: Present: S1 & S2.  Absent: rub, click





- Extremities


Extremities: no ischemia, pulses intact, ankle edema L>R





- Peripheral Assessment


Peripheral Pulses: within normal limits


 


- Abdominal


General gastrointestinal: soft, non-tender, normal bowel sounds





- Integumentary


Integumentary: Present: warm, dry





- Musculoskeletal


Musculoskeletal: Able to move all extremities





-Neurological


Neurological: CN II-XII intact





- Psychiatric


Psychiatric: cooperative





- Constitutional


Vitals: 


                                        











Temp Pulse Resp BP Pulse Ox


 


 98.2 F   90   17   143/83   95 


 


 12/19/19 02:57  12/19/19 04:15  12/19/19 04:15  12/19/19 04:15  12/19/19 04:21














Results





- Labs


CBC & Chem 7: 


                                 12/19/19 00:02





                                 12/19/19 00:02


Labs: 


                             Laboratory Last Values











WBC  7.8 K/mm3 (4.5-11.0)   12/19/19  00:02    


 


RBC  2.78 M/mm3 (3.65-5.03)  L  12/19/19  00:02    


 


Hgb  8.2 gm/dl (11.8-15.2)  L  12/19/19  00:02    


 


Hct  25.3 % (35.5-45.6)  L  12/19/19  00:02    


 


MCV  91 fl (84-94)   12/19/19  00:02    


 


MCH  30 pg (28-32)   12/19/19  00:02    


 


MCHC  33 % (32-34)   12/19/19  00:02    


 


RDW  19.2 % (13.2-15.2)  H  12/19/19  00:02    


 


Plt Count  223 K/mm3 (140-440)   12/19/19  00:02    


 


Lymph % (Auto)  15.1 % (13.4-35.0)   12/19/19  00:02    


 


Mono % (Auto)  8.6 % (0.0-7.3)  H  12/19/19  00:02    


 


Eos % (Auto)  10.9 % (0.0-4.3)  H  12/19/19  00:02    


 


Baso % (Auto)  1.4 % (0.0-1.8)   12/19/19  00:02    


 


Lymph #  1.2 K/mm3 (1.2-5.4)   12/19/19  00:02    


 


Mono #  0.7 K/mm3 (0.0-0.8)   12/19/19  00:02    


 


Eos #  0.8 K/mm3 (0.0-0.4)  H  12/19/19  00:02    


 


Baso #  0.1 K/mm3 (0.0-0.1)   12/19/19  00:02    


 


Seg Neutrophils %  64.0 % (40.0-70.0)   12/19/19  00:02    


 


Seg Neutrophils #  5.0 K/mm3 (1.8-7.7)   12/19/19  00:02    


 


PT  25.0 Sec. (12.2-14.9)  H  12/19/19  00:37    


 


INR  2.21  (0.87-1.13)  H  12/19/19  00:37    


 


APTT  40.4 Sec. (24.2-36.6)  H  12/19/19  00:37    


 


Sodium  137 mmol/L (137-145)   12/19/19  00:02    


 


Potassium  4.5 mmol/L (3.6-5.0)   12/19/19  00:02    


 


Chloride  101.5 mmol/L ()   12/19/19  00:02    


 


Carbon Dioxide  17 mmol/L (22-30)  L  12/19/19  00:02    


 


Anion Gap  23 mmol/L  12/19/19  00:02    


 


BUN  70 mg/dL (9-20)  H  12/19/19  00:02    


 


Creatinine  13.2 mg/dL (0.8-1.5)  H  12/19/19  00:02    


 


Estimated GFR  5 ml/min  12/19/19  00:02    


 


BUN/Creatinine Ratio  5 %  12/19/19  00:02    


 


Glucose  104 mg/dL ()  H  12/19/19  00:02    


 


Calcium  9.5 mg/dL (8.4-10.2)   12/19/19  00:02    


 


Troponin T  0.055 ng/mL (0.00-0.029)  H  12/19/19  02:59    


 


Triglycerides  130 mg/dL (2-149)   12/19/19  00:02    


 


Cholesterol  172 mg/dL ()   12/19/19  00:02    


 


LDL Cholesterol Direct  85 mg/dL ()   12/19/19  00:02    


 


HDL Cholesterol  70 mg/dL (40-59)  H  12/19/19  00:02    


 


Cholesterol/HDL Ratio  2.45 %  12/19/19  00:02    














- Imaging and Cardiology


Imaging and Cardiology: 





CXR:


FINDINGS:





SUPPORT DEVICES: None.


HEART / MEDIASTINUM: Moderately enlarged but stable.


LUNGS / PLEURA: There is now extensive bilateral perihilar and lower lobe lung 

consolidation


suggesting pulmonary edema. Mild venous congestion is also seen. No effusions 

are seen. No pneumothorax.





ADDITIONAL FINDINGS: No significant additional findings.





IMPRESSION:


1 Significant interval worsening likely pulmonary edema.





Assessment and Plan


Assessment and plan: 











49-year-old -American male with history of ESRD on HD, hypertension, A. 

fib on anticoagulation who presents to [facility] ED with complaints of chest pain and

missed HD session.  





Acute Atypical Chest Pain


-Initiate chest pain protocol


-Continuous telemetry monitoring


-Continue supportive care


-Pain mgmt


-Troponin negative0.055 x 2


-EKG unrevealing for acute ischemic abnormalities


-Negative thallium stress test on 12/2


-Cardiology consulted





ESRD on HD


-T/Th/S


-Last dialyzed 12/14/19


-Missed 12/17 HD session due to malfunction of HD access, which has been 

resolved


-CXR shows Significant interval worsening likely pulmonary edema


-Avoid nephrotoxic agents


-Renal dose all meds


-Nephrology consulted





Paroxysmal A. Fib


-Continue Coumadin and amiodarone





HTN


-Monitor BP


-Resume home hypertensive meds 





Chronic Anemia 


-Hemoglobin on admission 8.2


-No s/s of active bleeding


-Continue to monitor hemoglobin


-Transfuse as needed





DVT PPX


-On Heparin

















Advance Directives: No


VTE prophylaxis?: Mechanical


Plan of care discussed with patient/family: Yes





<EMMETT MARQUEZ - Last Filed: 12/19/19 06:03>





Medications and Allergies


Active Meds: 


Active Medications





Acetaminophen (Tylenol)  650 mg PO Q4H PRN


   PRN Reason: Pain MILD(1-3)/Fever >100.5/HA


Amiodarone HCl (Cordarone)  200 mg PO QDAY [facility]


Carvedilol (Coreg)  25 mg PO QDAY [facility]


Clonidine HCl (Catapres-Tts Patch)  0.3 mg TD Th [facility]


Diphenhydramine HCl (Benadryl)  25 mg IV Q6H PRN


   PRN Reason: Itching


Docusate Sodium (Colace)  100 mg PO BID [facility]


Hydralazine HCl (Apresoline)  100 mg PO TID [facility]


Minoxidil (Loniten)  2.5 mg PO QDAY [facility]


Nifedipine (Procardia Xl)  90 mg PO QDAY@0600 [facility]


Nitroglycerin (Nitrostat)  0.4 mg SL .Q5MIN PRN


   PRN Reason: Chest Pain


Ondansetron HCl (Zofran)  4 mg IV Q6H PRN


   PRN Reason: Nausea And Vomiting


Oxycodone/Acetaminophen (Percocet 5/325)  1 tab PO Q6H PRN


   PRN Reason: Pain, Moderate (4-6)


Pantoprazole Sodium (Protonix)  40 mg PO QDAY [facility]


Sodium Chloride (Sodium Chloride Flush Syringe 10 Ml)  10 ml IV BID [facility]


Sodium Chloride (Sodium Chloride Flush Syringe 10 Ml)  10 ml IV PRN PRN


   PRN Reason: LINE FLUSH


Sucralfate (Carafate)  1 gm PO ACHS [facility]











Exam





- Constitutional


Vitals: 


                                        











Temp Pulse Resp BP Pulse Ox


 


 98.2 F   90   16   149/82   94 


 


 12/19/19 02:57  12/19/19 05:24  12/19/19 05:24  12/19/19 05:15  12/19/19 05:24














Results





- Labs


CBC & Chem 7: 


                                 12/19/19 00:02





                                 12/19/19 00:02


Labs: 


                             Laboratory Last Values











WBC  7.8 K/mm3 (4.5-11.0)   12/19/19  00:02    


 


RBC  2.78 M/mm3 (3.65-5.03)  L  12/19/19  00:02    


 


Hgb  8.2 gm/dl (11.8-15.2)  L  12/19/19  00:02    


 


Hct  25.3 % (35.5-45.6)  L  12/19/19  00:02    


 


MCV  91 fl (84-94)   12/19/19  00:02    


 


MCH  30 pg (28-32)   12/19/19  00:02    


 


MCHC  33 % (32-34)   12/19/19  00:02    


 


RDW  19.2 % (13.2-15.2)  H  12/19/19  00:02    


 


Plt Count  223 K/mm3 (140-440)   12/19/19  00:02    


 


Lymph % (Auto)  15.1 % (13.4-35.0)   12/19/19  00:02    


 


Mono % (Auto)  8.6 % (0.0-7.3)  H  12/19/19  00:02    


 


Eos % (Auto)  10.9 % (0.0-4.3)  H  12/19/19  00:02    


 


Baso % (Auto)  1.4 % (0.0-1.8)   12/19/19  00:02    


 


Lymph #  1.2 K/mm3 (1.2-5.4)   12/19/19  00:02    


 


Mono #  0.7 K/mm3 (0.0-0.8)   12/19/19  00:02    


 


Eos #  0.8 K/mm3 (0.0-0.4)  H  12/19/19  00:02    


 


Baso #  0.1 K/mm3 (0.0-0.1)   12/19/19  00:02    


 


Seg Neutrophils %  64.0 % (40.0-70.0)   12/19/19  00:02    


 


Seg Neutrophils #  5.0 K/mm3 (1.8-7.7)   12/19/19  00:02    


 


PT  25.0 Sec. (12.2-14.9)  H  12/19/19  00:37    


 


INR  2.21  (0.87-1.13)  H  12/19/19  00:37    


 


APTT  40.4 Sec. (24.2-36.6)  H  12/19/19  00:37    


 


Sodium  137 mmol/L (137-145)   12/19/19  00:02    


 


Potassium  4.5 mmol/L (3.6-5.0)   12/19/19  00:02    


 


Chloride  101.5 mmol/L ()   12/19/19  00:02    


 


Carbon Dioxide  17 mmol/L (22-30)  L  12/19/19  00:02    


 


Anion Gap  23 mmol/L  12/19/19  00:02    


 


BUN  70 mg/dL (9-20)  H  12/19/19  00:02    


 


Creatinine  13.2 mg/dL (0.8-1.5)  H  12/19/19  00:02    


 


Estimated GFR  5 ml/min  12/19/19  00:02    


 


BUN/Creatinine Ratio  5 %  12/19/19  00:02    


 


Glucose  104 mg/dL ()  H  12/19/19  00:02    


 


Calcium  9.5 mg/dL (8.4-10.2)   12/19/19  00:02    


 


Troponin T  0.055 ng/mL (0.00-0.029)  H  12/19/19  02:59    


 


Triglycerides  130 mg/dL (2-149)   12/19/19  00:02    


 


Cholesterol  172 mg/dL ()   12/19/19  00:02    


 


LDL Cholesterol Direct  85 mg/dL ()   12/19/19  00:02    


 


HDL Cholesterol  70 mg/dL (40-59)  H  12/19/19  00:02    


 


Cholesterol/HDL Ratio  2.45 %  12/19/19  00:02    














Assessment and Plan


Assessment and plan: 


Patient seen and examined, with plan as stated above

## 2019-12-19 NOTE — CONSULTATION
History of Present Illness


Consult date: 12/19/19


Consult reason: chest pain


History of present illness: 





49-year old male with end-stage renal disease on hemodialysis, chronic anemia 

and hypertension. He also has paroxysmal atrial fibrillation on warfarin for 

anticoagulation. He presented with atypical chest pain, that reproducible with 

palpation and position changes. Patient denies unusual shortness of breath or 

lower extremity edema. He admits to one missed dialysis session this week. Of 

note, the patient was admitted to this hospital two weeks ago with chest pain. 

At that time he underwent a Lexiscan thallium stress test that showed normal 

myocardial perfusion, ejection fraction 52%.





Chest x-ray shows cardiomegaly with interstitial edema. INR is therapeutic at 

2.2. Cardiac enzymes shows a CK of 124 with a normal relative index of 2.3. 

Troponins mildly elevated likely in the setting of renal failure.  An ECG is 

sinus rhythm, no acute ischemic changes. 





Past History


Past Medical History: atrial fib (on Coumadin ), ESRD (T/Th/S), hypertension


Past Surgical History: Other (L AV Fistula)


Social history: lives with family, full code.  denies: smoking


Family history: no significant family history





Medications and Allergies


                                    Allergies











Allergy/AdvReac Type Severity Reaction Status Date / Time


 


No Known Allergies Allergy   Unverified 01/16/17 08:22











                                Home Medications











 Medication  Instructions  Recorded  Confirmed  Last Taken  Type


 


Amiodarone [Cordarone 200 MG TAB] 200 mg PO QDAY #30 tablet 12/09/19 12/19/19 

Unknown Rx


 


Epoetin Amadeo 10,000 Unit [Procrit] 10,000 unit IV QUITA PRN #1 vial 12/09/19 12/19/19 Unknown Rx


 


Minoxidil [Loniten] 2.5 mg PO QDAY #30 tablet 12/09/19 12/19/19 Unknown Rx


 


NIFEdipine XL [Procardia Xl] 90 mg PO QDAY #30 tablet 12/09/19 12/19/19 Unknown 

Rx


 


Pantoprazole [Protonix TAB] 40 mg PO QDAY #30 tablet 12/09/19 12/19/19 Unknown 

Rx


 


Sucralfate [Carafate] 1 gm PO ACHS #90 tablet 12/09/19 12/19/19 Unknown Rx


 


Warfarin [Coumadin] 7.5 mg PO QDAY #30 tablet 12/09/19 12/19/19 Unknown Rx


 


carvediloL [Coreg] 25 mg PO QDAY #30 tab 12/09/19 12/19/19 Unknown Rx


 


cloNIDine-TTS PATCH [Catapres-Tts 1 patch TD Q7D #4 patch 12/09/19 12/19/19 

Unknown Rx





0.3mg Patch]     


 


hydrALAZINE [Apresoline TAB] 100 mg PO TID #90 tab 12/09/19 12/19/19 Unknown Rx


 


Cinacalcet 30 mg PO DAILY 12/19/19 12/19/19 Unknown History


 


Furosemide 80 mg PO DAILY 12/19/19 12/19/19 Unknown History











Active Meds: 


Active Medications





Acetaminophen (Tylenol)  650 mg PO Q4H PRN


   PRN Reason: Pain MILD(1-3)/Fever >100.5/HA


Amiodarone HCl (Cordarone)  200 mg PO QDAY [facility]


Carvedilol (Coreg)  25 mg PO QDAY [facility]


Clonidine HCl (Catapres-Tts Patch)  0.3 mg TD Th [facility]


Diphenhydramine HCl (Benadryl)  25 mg IV Q6H PRN


   PRN Reason: Itching


Docusate Sodium (Colace)  100 mg PO BID [facility]


Epoetin Amadeo (Procrit)  10,000 unit IV QUITA PRN


   PRN Reason: hemodialysis


Hydralazine HCl (Apresoline)  100 mg PO TID [facility]


   Last Admin: 12/19/19 08:48 Dose:  100 mg


   Documented by: 


Sodium Chloride (Nacl 0.9%)  100 mls @ 999 mls/hr IV QUITA PRN


   PRN Reason: Hypotension


Minoxidil (Loniten)  2.5 mg PO QDAY [facility]


Nifedipine (Procardia Xl)  90 mg PO QDAY@0600 [facility]


   Last Admin: 12/19/19 07:48 Dose:  90 mg


   Documented by: 


Nitroglycerin (Nitrostat)  0.4 mg SL .Q5MIN PRN


   PRN Reason: Chest Pain


Ondansetron HCl (Zofran)  4 mg IV Q6H PRN


   PRN Reason: Nausea And Vomiting


Oxycodone/Acetaminophen (Percocet 5/325)  1 tab PO Q6H PRN


   PRN Reason: Pain, Moderate (4-6)


Pantoprazole Sodium (Protonix)  40 mg PO QDAY [facility]


Sodium Chloride (Sodium Chloride Flush Syringe 10 Ml)  10 ml IV BID [facility]


Sodium Chloride (Sodium Chloride Flush Syringe 10 Ml)  10 ml IV PRN PRN


   PRN Reason: LINE FLUSH


Sucralfate (Carafate)  1 gm PO ACHS [facility]


Warfarin Sodium (Coumadin)  7.5 mg PO DAILY@1700 [facility]











Physical Examination


                                   Vital Signs











Pulse Resp Pulse Ox


 


 82   19   96 


 


 12/19/19 02:31  12/19/19 02:31  12/19/19 02:31











General appearance: no acute distress


HEENT: Positive: PERRL


Neck: Positive: trachea midline


Cardiac: Positive: Reg Rate and Rhythm


Lungs: Positive: Decreased Breath Sounds


Neuro: Positive: Grossly Intact





Results





                                 12/19/19 00:02





                                 12/19/19 00:02


                                 Cardiac Enzymes











  12/19/19 Range/Units





  07:42 


 


CK-MB (CK-2)  2.9  (0.0-4.0)  ng/mL








                                   Coagulation











  12/19/19 Range/Units





  00:37 


 


PT  25.0 H  (12.2-14.9)  Sec.


 


INR  2.21 H  (0.87-1.13)  


 


APTT  40.4 H  (24.2-36.6)  Sec.








                                     Lipids











  12/19/19 Range/Units





  00:02 


 


Triglycerides  130  (2-149)  mg/dL


 


Cholesterol  172  ()  mg/dL


 


HDL Cholesterol  70 H  (40-59)  mg/dL


 


Cholesterol/HDL Ratio  2.45  %








                                       CBC











  12/19/19 Range/Units





  00:02 


 


WBC  7.8  (4.5-11.0)  K/mm3


 


RBC  2.78 L  (3.65-5.03)  M/mm3


 


Hgb  8.2 L  (11.8-15.2)  gm/dl


 


Hct  25.3 L  (35.5-45.6)  %


 


Plt Count  223  (140-440)  K/mm3


 


Lymph #  1.2  (1.2-5.4)  K/mm3


 


Mono #  0.7  (0.0-0.8)  K/mm3


 


Eos #  0.8 H  (0.0-0.4)  K/mm3


 


Baso #  0.1  (0.0-0.1)  K/mm3








                          Comprehensive Metabolic Panel











  12/19/19 Range/Units





  00:02 


 


Sodium  137  (137-145)  mmol/L


 


Potassium  4.5  (3.6-5.0)  mmol/L


 


Chloride  101.5  ()  mmol/L


 


Carbon Dioxide  17 L  (22-30)  mmol/L


 


BUN  70 H  (9-20)  mg/dL


 


Creatinine  13.2 H  (0.8-1.5)  mg/dL


 


Glucose  104 H  ()  mg/dL


 


Calcium  9.5  (8.4-10.2)  mg/dL














Assessment and Plan





Atypical chest pain, musculoskeletal


   normal myocardial perfusion by MPI 12/2/19


Paroxysmal afib


   on warfarin for anticoagulation


ESRD on dialysis


Hypertension


Chronic Anemia

## 2019-12-19 NOTE — XRAY REPORT
CHEST 1 VIEW 



INDICATION / CLINICAL INFORMATION:

Chest Pain. 



COMPARISON: 

12/2/2019



FINDINGS:



SUPPORT DEVICES: None.

HEART / MEDIASTINUM: Moderately enlarged but stable. 

LUNGS / PLEURA: There is now extensive bilateral perihilar and lower lobe lung consolidation suggesti
ng pulmonary edema. Mild venous congestion is also seen. No effusions are seen. No pneumothorax. 



ADDITIONAL FINDINGS: No significant additional findings.



IMPRESSION:

1 Significant interval worsening likely pulmonary edema.



Signer Name: Christian Dobson MD 

Signed: 12/19/2019 12:19 AM

 Workstation Name: [employer]

## 2019-12-20 VITALS — DIASTOLIC BLOOD PRESSURE: 82 MMHG | SYSTOLIC BLOOD PRESSURE: 146 MMHG

## 2019-12-20 LAB
BASOPHILS # (AUTO): 0 K/MM3 (ref 0–0.1)
BASOPHILS NFR BLD AUTO: 0.8 % (ref 0–1.8)
BUN SERPL-MCNC: 43 MG/DL (ref 9–20)
BUN/CREAT SERPL: 5 %
CALCIUM SERPL-MCNC: 8.8 MG/DL (ref 8.4–10.2)
EOSINOPHIL # BLD AUTO: 0.5 K/MM3 (ref 0–0.4)
EOSINOPHIL NFR BLD AUTO: 9.2 % (ref 0–4.3)
HCT VFR BLD CALC: 23.8 % (ref 35.5–45.6)
HEMOLYSIS INDEX: 0
HGB BLD-MCNC: 7.8 GM/DL (ref 11.8–15.2)
INR PPP: 2.37 (ref 0.87–1.13)
LYMPHOCYTES # BLD AUTO: 0.9 K/MM3 (ref 1.2–5.4)
LYMPHOCYTES NFR BLD AUTO: 16.5 % (ref 13.4–35)
MCHC RBC AUTO-ENTMCNC: 33 % (ref 32–34)
MCV RBC AUTO: 90 FL (ref 84–94)
MONOCYTES # (AUTO): 0.6 K/MM3 (ref 0–0.8)
MONOCYTES % (AUTO): 10.5 % (ref 0–7.3)
PLATELET # BLD: 188 K/MM3 (ref 140–440)
RBC # BLD AUTO: 2.66 M/MM3 (ref 3.65–5.03)

## 2019-12-20 RX ADMIN — HYDRALAZINE HYDROCHLORIDE SCH: 100 TABLET, FILM COATED ORAL at 14:00

## 2019-12-20 RX ADMIN — HYDRALAZINE HYDROCHLORIDE SCH MG: 100 TABLET, FILM COATED ORAL at 11:32

## 2019-12-20 RX ADMIN — NIFEDIPINE SCH MG: 90 TABLET, EXTENDED RELEASE ORAL at 05:03

## 2019-12-20 RX ADMIN — Medication SCH ML: at 11:34

## 2019-12-20 RX ADMIN — PANTOPRAZOLE SODIUM SCH MG: 40 TABLET, DELAYED RELEASE ORAL at 11:31

## 2019-12-20 RX ADMIN — SUCRALFATE SCH GM: 1 TABLET ORAL at 08:00

## 2019-12-20 RX ADMIN — MINOXIDIL SCH MG: 2.5 TABLET ORAL at 11:31

## 2019-12-20 RX ADMIN — DOCUSATE SODIUM SCH MG: 100 CAPSULE, LIQUID FILLED ORAL at 11:31

## 2019-12-20 RX ADMIN — SUCRALFATE SCH GM: 1 TABLET ORAL at 11:33

## 2019-12-20 NOTE — CONSULTATION
History of Present Illness


Consult date: 12/20/19


Requesting physician: DEENA JIMENES


Reason for consult: abnormal CXR/CT


History of present illness: 





50 y/o male admitted two days ago with dyspnea and chest pain.  CXR showed 

bilateral pulmonary edema in bat wing pattern.  At suggestion of Cardiology, 

pulmonary was consulted.  Repeat CXR today has significant improvement with 

diuresis only.  





Past History


Past Medical History: atrial fib (on Coumadin ), ESRD (T/Th/S), hypertension


Past Surgical History: Other (L AV Fistula)


Social history: lives with family, full code.  denies: smoking


Family history: no significant family history





Medications and Allergies


                                    Allergies











Allergy/AdvReac Type Severity Reaction Status Date / Time


 


No Known Allergies Allergy   Unverified 01/16/17 08:22











                                Home Medications











 Medication  Instructions  Recorded  Confirmed  Last Taken  Type


 


Amiodarone [Cordarone 200 MG TAB] 200 mg PO QDAY #30 tablet 12/09/19 12/19/19 

Unknown Rx


 


Epoetin Amadeo 10,000 Unit [Procrit] 10,000 unit IV QUITA PRN #1 vial 12/09/19 12/19/19 Unknown Rx


 


Minoxidil [Loniten] 2.5 mg PO QDAY #30 tablet 12/09/19 12/19/19 Unknown Rx


 


NIFEdipine XL [Procardia Xl] 90 mg PO QDAY #30 tablet 12/09/19 12/19/19 Unknown 

Rx


 


Pantoprazole [Protonix TAB] 40 mg PO QDAY #30 tablet 12/09/19 12/19/19 Unknown 

Rx


 


Sucralfate [Carafate] 1 gm PO ACHS #90 tablet 12/09/19 12/19/19 Unknown Rx


 


Warfarin [Coumadin] 7.5 mg PO QDAY #30 tablet 12/09/19 12/19/19 Unknown Rx


 


carvediloL [Coreg] 25 mg PO QDAY #30 tab 12/09/19 12/19/19 Unknown Rx


 


cloNIDine-TTS PATCH [Catapres-Tts 1 patch TD Q7D #4 patch 12/09/19 12/19/19 

Unknown Rx





0.3mg Patch]     


 


hydrALAZINE [Apresoline TAB] 100 mg PO TID #90 tab 12/09/19 12/19/19 Unknown Rx


 


Cinacalcet 30 mg PO DAILY 12/19/19 12/19/19 Unknown History


 


Furosemide 80 mg PO DAILY 12/19/19 12/19/19 Unknown History











Active Meds: 


Active Medications





Acetaminophen (Tylenol)  650 mg PO Q4H PRN


   PRN Reason: Pain MILD(1-3)/Fever >100.5/HA


   Last Admin: 12/20/19 08:03 Dose:  650 mg


   Documented by: 


Carvedilol (Coreg)  25 mg PO QDAY [facility]


   Last Admin: 12/20/19 11:32 Dose:  25 mg


   Documented by: 


Cinacalcet (Sensipar)  30 mg PO QDAY [facility]


   Last Admin: 12/20/19 11:40 Dose:  30 mg


   Documented by: 


Clonidine HCl (Catapres-Tts Patch)  0.3 mg TD Th [facility]


   Last Admin: 12/19/19 18:18 Dose:  Not Given


   Documented by: 


Diphenhydramine HCl (Benadryl)  25 mg IV Q6H PRN


   PRN Reason: Itching


Docusate Sodium (Colace)  100 mg PO BID [facility]


   Last Admin: 12/20/19 11:31 Dose:  100 mg


   Documented by: 


Epoetin Amadeo (Procrit)  10,000 unit IV QUITA PRN


   PRN Reason: hemodialysis


Furosemide (Lasix)  80 mg PO DAILY@0600 [facility]


   Last Admin: 12/20/19 11:40 Dose:  80 mg


   Documented by: 


Hydralazine HCl (Apresoline)  100 mg PO TID [facility]


   Last Admin: 12/20/19 11:32 Dose:  100 mg


   Documented by: 


Sodium Chloride (Nacl 0.9%)  100 mls @ 999 mls/hr IV [facility]ITA PRN


   PRN Reason: Hypotension


Minoxidil (Loniten)  2.5 mg PO QDAY [facility]


   Last Admin: 12/20/19 11:31 Dose:  2.5 mg


   Documented by: 


Nifedipine (Procardia Xl)  90 mg PO QDAY@0600 [facility]


   Last Admin: 12/20/19 05:03 Dose:  90 mg


   Documented by: 


Nitroglycerin (Nitrostat)  0.4 mg SL .Q5MIN PRN


   PRN Reason: Chest Pain


Ondansetron HCl (Zofran)  4 mg IV Q6H PRN


   PRN Reason: Nausea And Vomiting


Oxycodone/Acetaminophen (Percocet 5/325)  1 tab PO Q6H PRN


   PRN Reason: Pain, Moderate (4-6)


Pantoprazole Sodium (Protonix)  40 mg PO QDAY [facility]


   Last Admin: 12/20/19 11:31 Dose:  40 mg


   Documented by: 


Sodium Chloride (Sodium Chloride Flush Syringe 10 Ml)  10 ml IV BID [facility]


   Last Admin: 12/20/19 11:34 Dose:  10 ml


   Documented by: 


Sodium Chloride (Sodium Chloride Flush Syringe 10 Ml)  10 ml IV PRN PRN


   PRN Reason: LINE FLUSH


Sucralfate (Carafate)  1 gm PO ACHS [facility]


   Last Admin: 12/20/19 11:33 Dose:  1 gm


   Documented by: 


Warfarin Sodium (Coumadin)  7.5 mg PO DAILY@1700 [facility]


   Last Admin: 12/19/19 18:16 Dose:  7.5 mg


   Documented by: 











Physical Examination


Vital signs: 


                                   Vital Signs











Pulse Resp Pulse Ox


 


 82   19   96 


 


 12/19/19 02:31  12/19/19 02:31  12/19/19 02:31














Results





- Laboratory Findings


CBC and BMP: 


                                 12/20/19 05:14





                                 12/20/19 05:14


PT/INR, D-dimer











PT  26.4 Sec. (12.2-14.9)  H  12/20/19  05:14    


 


INR  2.37  (0.87-1.13)  H  12/20/19  05:14    








Abnormal lab findings: 


                                  Abnormal Labs











  12/19/19 12/19/19 12/19/19





  00:02 00:02 00:37


 


RBC  2.78 L  


 


Hgb  8.2 L  


 


Hct  25.3 L  


 


RDW  19.2 H  


 


Mono % (Auto)  8.6 H  


 


Eos % (Auto)  10.9 H  


 


Lymph #   


 


Eos #  0.8 H  


 


PT    25.0 H


 


INR    2.21 H


 


APTT    40.4 H


 


Chloride   


 


Carbon Dioxide   17 L 


 


BUN   70 H 


 


Creatinine   13.2 H 


 


Glucose   104 H 


 


Troponin T   0.055 H 


 


HDL Cholesterol   70 H 














  12/19/19 12/19/19 12/19/19





  02:59 05:35 07:42


 


RBC   


 


Hgb   


 


Hct   


 


RDW   


 


Mono % (Auto)   


 


Eos % (Auto)   


 


Lymph #   


 


Eos #   


 


PT   


 


INR   


 


APTT   


 


Chloride   


 


Carbon Dioxide   


 


BUN   


 


Creatinine   


 


Glucose   


 


Troponin T  0.055 H  0.054 H  0.059 H


 


HDL Cholesterol   














  12/19/19 12/20/19 12/20/19





  19:15 05:14 05:14


 


RBC   2.66 L 


 


Hgb   7.8 L 


 


Hct   23.8 L 


 


RDW   18.6 H 


 


Mono % (Auto)   10.5 H 


 


Eos % (Auto)   9.2 H 


 


Lymph #   0.9 L 


 


Eos #   0.5 H 


 


PT   


 


INR   


 


APTT   


 


Chloride    97.2 L


 


Carbon Dioxide   


 


BUN    43 H


 


Creatinine    9.1 H


 


Glucose   


 


Troponin T  0.071 H D  


 


HDL Cholesterol   














  12/20/19





  05:14


 


RBC 


 


Hgb 


 


Hct 


 


RDW 


 


Mono % (Auto) 


 


Eos % (Auto) 


 


Lymph # 


 


Eos # 


 


PT  26.4 H


 


INR  2.37 H


 


APTT 


 


Chloride 


 


Carbon Dioxide 


 


BUN 


 


Creatinine 


 


Glucose 


 


Troponin T 


 


HDL Cholesterol 














- Diagnostic Findings


Chest x-ray: image reviewed





Assessment and Plan





patient's cxr is markedly improved per report.  Cannot see imaging.  If this 

were not edema then there would have been no improvement with diuresis.  At this

point, will sign off.

## 2019-12-20 NOTE — PROGRESS NOTE
Hospitalist Physical





- Constitutional


Vitals: 


                                        











Temp Pulse Resp BP Pulse Ox


 


 98.3 F   98 H  18   159/93   88 


 


 12/20/19 12:45  12/20/19 12:45  12/20/19 12:45  12/20/19 12:45  12/20/19 12:45











General appearance: Present: no acute distress





Results





- Labs


CBC & Chem 7: 


                                 12/20/19 05:14





                                 12/20/19 05:14


Labs: 


                             Laboratory Last Values











WBC  5.6 K/mm3 (4.5-11.0)   12/20/19  05:14    


 


RBC  2.66 M/mm3 (3.65-5.03)  L  12/20/19  05:14    


 


Hgb  7.8 gm/dl (11.8-15.2)  L  12/20/19  05:14    


 


Hct  23.8 % (35.5-45.6)  L  12/20/19  05:14    


 


MCV  90 fl (84-94)   12/20/19  05:14    


 


MCH  29 pg (28-32)   12/20/19  05:14    


 


MCHC  33 % (32-34)   12/20/19  05:14    


 


RDW  18.6 % (13.2-15.2)  H  12/20/19  05:14    


 


Plt Count  188 K/mm3 (140-440)   12/20/19  05:14    


 


Lymph % (Auto)  16.5 % (13.4-35.0)   12/20/19  05:14    


 


Mono % (Auto)  10.5 % (0.0-7.3)  H  12/20/19  05:14    


 


Eos % (Auto)  9.2 % (0.0-4.3)  H  12/20/19  05:14    


 


Baso % (Auto)  0.8 % (0.0-1.8)   12/20/19  05:14    


 


Lymph #  0.9 K/mm3 (1.2-5.4)  L  12/20/19  05:14    


 


Mono #  0.6 K/mm3 (0.0-0.8)   12/20/19  05:14    


 


Eos #  0.5 K/mm3 (0.0-0.4)  H  12/20/19  05:14    


 


Baso #  0.0 K/mm3 (0.0-0.1)   12/20/19  05:14    


 


Seg Neutrophils %  63.0 % (40.0-70.0)   12/20/19  05:14    


 


Seg Neutrophils #  3.6 K/mm3 (1.8-7.7)   12/20/19  05:14    


 


PT  26.4 Sec. (12.2-14.9)  H  12/20/19  05:14    


 


INR  2.37  (0.87-1.13)  H  12/20/19  05:14    


 


APTT  40.4 Sec. (24.2-36.6)  H  12/19/19  00:37    


 


Sodium  137 mmol/L (137-145)   12/20/19  05:14    


 


Potassium  4.6 mmol/L (3.6-5.0)   12/20/19  05:14    


 


Chloride  97.2 mmol/L ()  L  12/20/19  05:14    


 


Carbon Dioxide  23 mmol/L (22-30)   12/20/19  05:14    


 


Anion Gap  21 mmol/L  12/20/19  05:14    


 


BUN  43 mg/dL (9-20)  H  12/20/19  05:14    


 


Creatinine  9.1 mg/dL (0.8-1.5)  H  12/20/19  05:14    


 


Estimated GFR  7 ml/min  12/20/19  05:14    


 


BUN/Creatinine Ratio  5 %  12/20/19  05:14    


 


Glucose  95 mg/dL ()   12/20/19  05:14    


 


Calcium  8.8 mg/dL (8.4-10.2)   12/20/19  05:14    


 


Total Creatine Kinase  115 units/L ()   12/19/19  19:15    


 


CK-MB (CK-2)  2.9 ng/mL (0.0-4.0)   12/19/19  19:15    


 


CK-MB (CK-2) Rel Index  2.5  (0-4)   12/19/19  19:15    


 


Troponin T  0.071 ng/mL (0.00-0.029)  H D 12/19/19  19:15    


 


Triglycerides  130 mg/dL (2-149)   12/19/19  00:02    


 


Cholesterol  172 mg/dL ()   12/19/19  00:02    


 


LDL Cholesterol Direct  85 mg/dL ()   12/19/19  00:02    


 


HDL Cholesterol  70 mg/dL (40-59)  H  12/19/19  00:02    


 


Cholesterol/HDL Ratio  2.45 %  12/19/19  00:02    














Active Medications





- Current Medications


Current Medications: 














Generic Name Dose Route Start Last Admin





  Trade Name Freq  PRN Reason Stop Dose Admin


 


Acetaminophen  650 mg  12/19/19 04:24  12/20/19 08:03





  Tylenol  PO   650 mg





  Q4H PRN   Administration





  Pain MILD(1-3)/Fever >100.5/HA  


 


Carvedilol  25 mg  12/19/19 10:00  12/20/19 11:32





  Coreg  PO   25 mg





  QDAY [facility]   Administration


 


Cinacalcet  30 mg  12/20/19 11:00  12/20/19 11:40





  Sensipar  PO   30 mg





  QDAY [facility]   Administration


 


Clonidine HCl  0.3 mg  12/19/19 08:00  12/19/19 18:18





  Catapres-Tts Patch  TD   Not Given





  Th [facility]  


 


Diphenhydramine HCl  25 mg  12/19/19 04:27 





  Benadryl  IV  





  Q6H PRN  





  Itching  


 


Docusate Sodium  100 mg  12/19/19 10:00  12/20/19 11:31





  Colace  PO   100 mg





  BID [facility]   Administration


 


Epoetin Amadeo  10,000 unit  12/19/19 06:54 





  Procrit  IV  





  QUITA PRN  





  hemodialysis  


 


Furosemide  80 mg  12/20/19 11:00  12/20/19 11:40





  Lasix  PO   80 mg





  DAILY@0600 [facility]   Administration


 


Hydralazine HCl  100 mg  12/19/19 08:00  12/20/19 11:32





  Apresoline  PO   100 mg





  TID [facility]   Administration


 


Sodium Chloride  100 mls @ 999 mls/hr  12/19/19 06:53 





  Nacl 0.9%  IV  





  QUITA PRN  





  Hypotension  


 


Minoxidil  2.5 mg  12/19/19 10:00  12/20/19 11:31





  Loniten  PO   2.5 mg





  QDAY [facility]   Administration


 


Nifedipine  90 mg  12/19/19 06:00  12/20/19 05:03





  Procardia Xl  PO   90 mg





  QDAY@0600 [facility]   Administration


 


Nitroglycerin  0.4 mg  12/19/19 04:27 





  Nitrostat  SL  





  .Q5MIN PRN  





  Chest Pain  


 


Ondansetron HCl  4 mg  12/19/19 04:24 





  Zofran  IV  





  Q6H PRN  





  Nausea And Vomiting  


 


Oxycodone/Acetaminophen  1 tab  12/19/19 04:24 





  Percocet 5/325  PO  





  Q6H PRN  





  Pain, Moderate (4-6)  


 


Pantoprazole Sodium  40 mg  12/19/19 10:00  12/20/19 11:31





  Protonix  PO   40 mg





  QDAY [facility]   Administration


 


Sodium Chloride  10 ml  12/19/19 10:00  12/20/19 11:34





  Sodium Chloride Flush Syringe 10 Ml  IV   10 ml





  BID [facility]   Administration


 


Sodium Chloride  10 ml  12/19/19 04:24 





  Sodium Chloride Flush Syringe 10 Ml  IV  





  PRN PRN  





  LINE FLUSH  


 


Sucralfate  1 gm  12/19/19 07:30  12/20/19 11:33





  Carafate  PO   1 gm





  [facility]   Administration


 


Warfarin Sodium  7.5 mg  12/19/19 17:00  12/19/19 18:16





  Coumadin  PO   7.5 mg





  DAILY@1700 [facility]   Administration














Nutrition/Malnutrition Assess





- Dietary Evaluation


Nutrition/Malnutrition Findings: 


                                        





Nutrition Notes                                            Start:  12/20/19 

11:12


Freq:                                                      Status: Active       




Protocol:                                                                       




 Document     12/20/19 11:12  CT  (Rec: 12/20/19 11:21  CT  [id number])


 Co-Sign      12/20/19 11:12  LP


 Nutrition Notes


     Need for Assessment generated from:         Education


     Initial or Follow up                        Assessment


     Current Diagnosis                           Hypertension


     Other Pertinent Diagnosis                   ESRD on HD, afib, chronic


                                                 anemia


     Current Diet                                Renal


     Labs/Tests                                  BUN 43


                                                 Cr 9.1


     Pertinent Medications                       Coumadin


     Height                                      5 ft 9 in


     Weight                                      61.235 kg


     Usual Body Weight                           61.235 kg


     Ideal Body Weight (kg)                      72.72


     BMI                                         19.9


     Intake Prior to Admission                   Fair


     Weight Status                               Appropriate


     Subjective/Other Information                Coumadin education screen.  Pt


                                                 stated UBW is 135 lbs and he


                                                 has noticed wt change d/t


                                                 fluid accumulation and HD but


                                                 did not know how many pounds.


                                                 Pt stated he was eating fair


                                                 PTA d/t having no appetite at


                                                 times.  Pt stated he has had


                                                 previous education for


                                                 coumadin and vitamin k


                                                 interactions, stating that he


                                                 is well versed on the topic.


                                                 Handout was left with pt.  Pt


                                                 ate 100% of his dinner and had


                                                 2 cereals for breakfast. He


                                                 had requested only having


                                                 cereal for breakfast.  Pt


                                                 stated he received Nepro at


                                                 his previous dialysis center


                                                 and would like it again.


     Burn                                        Absent


     Trauma                                      Absent


     GI Symptoms                                 None


     Current % PO                                Good (%)


     Minimum of two criteria                     No


     Energy Intake (non-severe)                  <75% Estimated Energy


                                                 Requirement >7 days


     #1


      Nutrition Diagnosis                        Inadequate oral intake


      Etiology                                   poor appetite


      As Evidenced by Signs and Symptoms         pt stating eating fair PTA


     Is patient on ventilator?                   No


     Is Patient Ambulatory and/or Out of Bed     Yes


     REE-([facility]-ambulatory/OOB) [     1895.049


      [id number]]                               


     Calculation Used for Recommendations        [facility]


     Additional Notes                            Protein needs: >73 g/kg/day (>


                                                 1.2 g/kg/day)


                                                 Fluid needs: 6890-6081 ml/day


                                                 or per MD


 Nutrition Intervention


     Change Diet Order:                          Continue Renal


     Add Supplement/Snack (indicate name/kcal    Add Nepro daily vanilla


      /protein )                                 


     Provides kCal:                              425


     Provides Protein (gm)                       20


     Teaching Recipient                          Patient


     Learning Readiness                          Good


     Teaching Methods                            Discussion,Handout


     Response to Teaching                        Verbalize understanding


     Education Handouts Provided                 Coumadin and vitamin K


     Barriers to Learning                        No Barriers


     RD phone number provided                    Yes


     Patient aware of follow up options          Yes


     Goal #1                                     Meet >75% of energy and


                                                 protein needs


     Goal #2                                     wt maintenance


     Anticipated Discharge Needs:                Renal with ONS PRN


     Follow-Up By:                               12/23/19


     Additional Comments                         Follow up for PO/ONS intakes

## 2019-12-20 NOTE — PROGRESS NOTE
Assessment and Plan





Atypical chest pain, musculoskeletal


   normal myocardial perfusion by MPI 12/2/19


Shortness of breath


   cxr this presentation shows a persistent cardiomegaly, with worse perihilar 

infiltrates, and possible right lower lobe consolidation. 


   pt admits to one missed dialysis session this week


Paroxysmal afib


   currently in sinus rhythm; on beta blockers for suppression


   on warfarin for anticoagulation


ESRD on dialysis


Hypertension


Chronic Anemia








Recommendations:


Pulmonary evaluation for persistent diffuse pulmonary opacities. The appearance 

is not consistent with cardiogenic pulmonary edema. 


Echocardiogram for left ventricular function and valvular function assessment.





Subjective


Date of service: 12/20/19


Interval history: 





Patient reports his breathing improved after dialysis. He denies chest pain.





Objective


                                   Vital Signs











  Temp Pulse Resp Resp BP Pulse Ox


 


 12/20/19 08:03    20   


 


 12/20/19 00:00   105 H    


 


 12/19/19 18:10   104 H    190/100 


 


 12/19/19 18:00   100 H    


 


 12/19/19 16:00  98.2 F  102 H  20   187/99 


 


 12/19/19 15:30   107 H    192/108 


 


 12/19/19 15:15   107 H    185/104 


 


 12/19/19 15:00   105 H    192/106 


 


 12/19/19 14:45   105 H    182/105 


 


 12/19/19 14:30   104 H    188/106 


 


 12/19/19 14:15   102 H    187/106 


 


 12/19/19 14:00   102 H    194/106 


 


 12/19/19 13:45   190 H    


 


 12/19/19 13:30   98 H    191/109 


 


 12/19/19 13:15   98 H    186/102 


 


 12/19/19 13:00   98 H    197/109 


 


 12/19/19 12:45   97 H    195/111 


 


 12/19/19 12:30   95 H    194/106 


 


 12/19/19 12:21  97.5 F L  100 H  20   188/97  87


 


 12/19/19 12:20  97 F L  99 H  20   208/104 


 


 12/19/19 10:00    20  20   96














- Physical Examination


General: No Apparent Distress


HEENT: Positive: PERRL


Neck: Positive: trachea midline.  Negative: Masses


Cardiac: Positive: Reg Rate and Rhythm


Lungs: Positive: Decreased Breath Sounds


Neuro: Positive: Grossly Intact


Extremities: Present: +1 Edema





- Labs and Meds


                                 Cardiac Enzymes











  12/19/19 Range/Units





  19:15 


 


CK-MB (CK-2)  2.9  (0.0-4.0)  ng/mL








                                   Coagulation











  12/20/19 Range/Units





  05:14 


 


PT  26.4 H  (12.2-14.9)  Sec.


 


INR  2.37 H  (0.87-1.13)  








                                       CBC











  12/20/19 Range/Units





  05:14 


 


WBC  5.6  (4.5-11.0)  K/mm3


 


RBC  2.66 L  (3.65-5.03)  M/mm3


 


Hgb  7.8 L  (11.8-15.2)  gm/dl


 


Hct  23.8 L  (35.5-45.6)  %


 


Plt Count  188  (140-440)  K/mm3


 


Lymph #  0.9 L  (1.2-5.4)  K/mm3


 


Mono #  0.6  (0.0-0.8)  K/mm3


 


Eos #  0.5 H  (0.0-0.4)  K/mm3


 


Baso #  0.0  (0.0-0.1)  K/mm3








                          Comprehensive Metabolic Panel











  12/20/19 Range/Units





  05:14 


 


Sodium  137  (137-145)  mmol/L


 


Potassium  4.6  (3.6-5.0)  mmol/L


 


Chloride  97.2 L  ()  mmol/L


 


Carbon Dioxide  23  (22-30)  mmol/L


 


BUN  43 H  (9-20)  mg/dL


 


Creatinine  9.1 H  (0.8-1.5)  mg/dL


 


Glucose  95  ()  mg/dL


 


Calcium  8.8  (8.4-10.2)  mg/dL

## 2019-12-20 NOTE — XRAY REPORT
CHEST 1 VIEW 



INDICATION: 

pulmonary opacities.



COMPARISON: 

12/18/2018.



FINDINGS:

Support devices: None.



Heart: Stable cardiomegaly. 

Lungs/Pleura: Marked improvement in bilateral opacities greatest at the perihilar region. Mild residu
al remains. 



Additional findings: None.



IMPRESSION: Marked interval improvement. Asymmetric edema is favored.



Signer Name: Zachary Burt MD 

Signed: 12/20/2019 11:40 AM

 Workstation Name: [id number]

## 2019-12-20 NOTE — PROGRESS NOTE
Assessment and Plan





# ESRD: s/p HD yesterday for shortness of breath.  Continue HD Tu/Th/Sa or prn 

based on labs.  Due tomorrow, no indication for HD today


- avoid nephrotoxins


- daily labs


- renal diet


- renally dose meds





# Anemia: JANELLE with HD prn, hemoglobin 7.8





# HTN: BP high but improved.  UF as tolerated with HD. Continue home meds post 

HD





# Acidosis: HD as above





# Secondary Hyperparathyroidism: continue home binders





Thank you for this consult.





Subjective


Date of service: 12/20/19


Interval history: 





No acute events noted.  Feeling well today.  No issues with HD noted yesterday. 

No dyspnea, chest pain noted.  Normal appetite.  Ready to go home.





Objective





- Exam


Narrative Exam: 





General appearance: well-developed, well-nourished, appears stated age


EENT: PERRL, mucous membranes moist


Neck: Present: neck supple, trachea midline.  Absent: JVD/HJR, Masses


Respiratory: Clear to Ascultation


Heart: regular, S1S2


Gastrointestinal: Present: normoactive bowel sounds.  Absent: tenderness


Integumentary: no rash, warm and dry


Neurologic: no focal deficit, no asterixis, alert and oriented x3


Psychiatric: mood/affect appropriate





- Vital Signs


Vital signs: 


                               Vital Signs - 12hr











  12/20/19 12/20/19 12/20/19





  00:00 00:01 05:28


 


Temperature  98.2 F 98.1 F


 


Pulse Rate 105 H 108 H 101 H


 


Respiratory  18 18





Rate   


 


Blood Pressure  155/94 155/87


 


O2 Sat by Pulse  88 92





Oximetry   














  12/20/19 12/20/19 12/20/19





  08:03 09:01 09:03


 


Temperature  98.0 F 


 


Pulse Rate  100 H 


 


Respiratory 20 18 20





Rate   


 


Blood Pressure  153/86 


 


O2 Sat by Pulse  89 





Oximetry   














  12/20/19





  11:32


 


Temperature 


 


Pulse Rate 100 H


 


Respiratory 





Rate 


 


Blood Pressure 156/86


 


O2 Sat by Pulse 





Oximetry 














- Lab





                                 12/20/19 05:14





                                 12/20/19 05:14


                             Most recent lab results











Calcium  8.8 mg/dL (8.4-10.2)   12/20/19  05:14    














Medications & Allergies





- Medications


Allergies/Adverse Reactions: 


                                    Allergies





No Known Allergies Allergy (Unverified 01/16/17 08:22)


   








Home Medications: 


                                Home Medications











 Medication  Instructions  Recorded  Confirmed  Last Taken  Type


 


Amiodarone [Cordarone 200 MG TAB] 200 mg PO QDAY #30 tablet 12/09/19 12/19/19 

Unknown Rx


 


Epoetin Amadeo 10,000 Unit [Procrit] 10,000 unit IV QUITA PRN #1 vial 12/09/19 12/19/19 Unknown Rx


 


Minoxidil [Loniten] 2.5 mg PO QDAY #30 tablet 12/09/19 12/19/19 Unknown Rx


 


NIFEdipine XL [Procardia Xl] 90 mg PO QDAY #30 tablet 12/09/19 12/19/19 Unknown 

Rx


 


Pantoprazole [Protonix TAB] 40 mg PO QDAY #30 tablet 12/09/19 12/19/19 Unknown 

Rx


 


Sucralfate [Carafate] 1 gm PO ACHS #90 tablet 12/09/19 12/19/19 Unknown Rx


 


Warfarin [Coumadin] 7.5 mg PO QDAY #30 tablet 12/09/19 12/19/19 Unknown Rx


 


carvediloL [Coreg] 25 mg PO QDAY #30 tab 12/09/19 12/19/19 Unknown Rx


 


cloNIDine-TTS PATCH [Catapres-Tts 1 patch TD Q7D #4 patch 12/09/19 12/19/19 

Unknown Rx





0.3mg Patch]     


 


hydrALAZINE [Apresoline TAB] 100 mg PO TID #90 tab 12/09/19 12/19/19 Unknown Rx


 


Cinacalcet 30 mg PO DAILY 12/19/19 12/19/19 Unknown History


 


Furosemide 80 mg PO DAILY 12/19/19 12/19/19 Unknown History











Active Medications: 














Generic Name Dose Route Start Last Admin





  Trade Name Freq  PRN Reason Stop Dose Admin


 


Acetaminophen  650 mg  12/19/19 04:24  12/20/19 08:03





  Tylenol  PO   650 mg





  Q4H PRN   Administration





  Pain MILD(1-3)/Fever >100.5/HA  


 


Carvedilol  25 mg  12/19/19 10:00  12/20/19 11:32





  Coreg  PO   25 mg





  QDAY ARRON   Administration


 


Cinacalcet  30 mg  12/20/19 11:00  12/20/19 11:40





  Sensipar  PO   30 mg





  QDAY ARRON   Administration


 


Clonidine HCl  0.3 mg  12/19/19 08:00  12/19/19 18:18





  Catapres-Tts Patch  TD   Not Given





  Th ARRON  


 


Diphenhydramine HCl  25 mg  12/19/19 04:27 





  Benadryl  IV  





  Q6H PRN  





  Itching  


 


Docusate Sodium  100 mg  12/19/19 10:00  12/20/19 11:31





  Colace  PO   100 mg





  BID ARRON   Administration


 


Epoetin Amadeo  10,000 unit  12/19/19 06:54 





  Procrit  IV  





  QUITA PRN  





  hemodialysis  


 


Furosemide  80 mg  12/20/19 11:00  12/20/19 11:40





  Lasix  PO   80 mg





  DAILY@0600 ARRON   Administration


 


Hydralazine HCl  100 mg  12/19/19 08:00  12/20/19 11:32





  Apresoline  PO   100 mg





  TID ARRON   Administration


 


Sodium Chloride  100 mls @ 999 mls/hr  12/19/19 06:53 





  Nacl 0.9%  IV  





  QUITA PRN  





  Hypotension  


 


Minoxidil  2.5 mg  12/19/19 10:00  12/20/19 11:31





  Loniten  PO   2.5 mg





  QDAY ARRON   Administration


 


Nifedipine  90 mg  12/19/19 06:00  12/20/19 05:03





  Procardia Xl  PO   90 mg





  QDAY@0600 [facility]   Administration


 


Nitroglycerin  0.4 mg  12/19/19 04:27 





  Nitrostat  SL  





  .Q5MIN PRN  





  Chest Pain  


 


Ondansetron HCl  4 mg  12/19/19 04:24 





  Zofran  IV  





  Q6H PRN  





  Nausea And Vomiting  


 


Oxycodone/Acetaminophen  1 tab  12/19/19 04:24 





  Percocet 5/325  PO  





  Q6H PRN  





  Pain, Moderate (4-6)  


 


Pantoprazole Sodium  40 mg  12/19/19 10:00  12/20/19 11:31





  Protonix  PO   40 mg





  QDAY ARRON   Administration


 


Sodium Chloride  10 ml  12/19/19 10:00  12/20/19 11:34





  Sodium Chloride Flush Syringe 10 Ml  IV   10 ml





  BID ARRON   Administration


 


Sodium Chloride  10 ml  12/19/19 04:24 





  Sodium Chloride Flush Syringe 10 Ml  IV  





  PRN PRN  





  LINE FLUSH  


 


Sucralfate  1 gm  12/19/19 07:30  12/20/19 11:33





  Carafate  PO   1 gm





  ACHS ARRON   Administration


 


Warfarin Sodium  7.5 mg  12/19/19 17:00  12/19/19 18:16





  Coumadin  PO   7.5 mg





  DAILY@1700 ARRON   Administration

## 2019-12-20 NOTE — DISCHARGE SUMMARY
Providers





- Providers


Date of Admission: 


12/19/19 04:24





Date of discharge: 12/20/19


Attending physician: 


DEENA JIMENES





                                        





12/19/19 03:51


Consult to Physician [CONS] Urgent 


   Comment: Dr. Sharma spoke with Dr. Pino @ [phone]


   Consulting Provider: ELISHA STEVE


   Physician Instructions: 


   Reason For Exam: esrd pulm edema, needing dialysis











Primary care physician: 


PRIMARY CARE MD








Hospitalization


Condition: Fair


Hospital course: 


Patient is 49-year-old -American male with history of ESRD on HD, 

hypertension, A. fib on anticoagulation who presented to [facility] ED with complaints

 of chest pain and missed HD session.  Patient states that he's been 

experiencing intermittent chest pain for the past day.  He rates the pain 5/10. 

 There are no aggravating or relieving factors.  Patient missed  HD session due 

to malfunction of AV fistula.  AV fistula was repaired but his next scheduled HD

 session at outpatient clinic is not until 12/19/19.  Patient states he was 

unable to wait for clinic to open up to be dialyzed.  He states that his ankles 

are swollen and he is becoming SOB with exertion.  Review of medical records 

shows patient was admitted on 12/2/19 with similar complaints. Pt had thallium 

stress test during that admission which was negative. He was admitted, evaluated

 by Cardiology and Pulmonology. Cardiology stated chest pain is non cardiac. 

Dialysis was arranged and done. Afterwards he felt better, chest pain was 

resolved so discharged home.











Disposition: DC-01 TO HOME OR SELFCARE





- Discharge Diagnoses


(1) Chest pain due to GERD


Status: Acute   





(2) Pulmonary edema


Status: Acute   





(3) Fluid overload


Status: Acute   





(4) Chronic atrial fibrillation


Status: Acute   





(5) End-stage renal disease needing dialysis


Status: Acute   





(6) Hypertensive urgency


Status: Acute   





Core Measure Documentation





- Palliative Care


Palliative Care/ Comfort Measures: Not Applicable





- Core Measures


Any of the following diagnoses?: none





Exam





- Constitutional


Vitals: 


                                        











Temp Pulse Resp BP Pulse Ox


 


 98.3 F   98 H  18   159/93   88 


 


 12/20/19 12:45  12/20/19 12:45  12/20/19 12:45  12/20/19 12:45  12/20/19 12:45














Plan


Activity: advance as tolerated


Diet: low fat, low cholesterol, low salt, renal


Plan of Treatment: 


1.Follow up with PCP in 1 week


2.Check INR in 1 week at PCP office


3.Routine hemodialysis as scheduled


Follow up with: 


PRIMARY CARE,MD [Primary Care Provider] - 3-5 Days


Forms:  Warfarin Discharge Instruction

## 2020-01-04 ENCOUNTER — HOSPITAL ENCOUNTER (OUTPATIENT)
Dept: HOSPITAL 5 - ED | Age: 50
Setting detail: OBSERVATION
LOS: 1 days | Discharge: HOME | End: 2020-01-05
Attending: INTERNAL MEDICINE | Admitting: INTERNAL MEDICINE
Payer: MEDICARE

## 2020-01-04 DIAGNOSIS — I48.91: ICD-10-CM

## 2020-01-04 DIAGNOSIS — I12.0: ICD-10-CM

## 2020-01-04 DIAGNOSIS — Z79.01: ICD-10-CM

## 2020-01-04 DIAGNOSIS — D62: Primary | ICD-10-CM

## 2020-01-04 DIAGNOSIS — N18.6: ICD-10-CM

## 2020-01-04 DIAGNOSIS — Z99.2: ICD-10-CM

## 2020-01-04 DIAGNOSIS — R79.1: ICD-10-CM

## 2020-01-04 LAB
ALBUMIN SERPL-MCNC: 4 G/DL (ref 3.9–5)
ALT SERPL-CCNC: 41 UNITS/L (ref 7–56)
APTT BLD: 43.6 SEC. (ref 24.2–36.6)
BASOPHILS # (AUTO): 0.1 K/MM3 (ref 0–0.1)
BASOPHILS NFR BLD AUTO: 2.9 % (ref 0–1.8)
BUN SERPL-MCNC: 25 MG/DL (ref 9–20)
BUN/CREAT SERPL: 6 %
CALCIUM SERPL-MCNC: 9 MG/DL (ref 8.4–10.2)
EOSINOPHIL # BLD AUTO: 0.6 K/MM3 (ref 0–0.4)
EOSINOPHIL NFR BLD AUTO: 11.2 % (ref 0–4.3)
HCT VFR BLD CALC: 19.7 % (ref 35.5–45.6)
HEMOLYSIS INDEX: 5
HGB BLD-MCNC: 6.7 GM/DL (ref 11.8–15.2)
INR PPP: 4.32 (ref 0.87–1.13)
LYMPHOCYTES # BLD AUTO: 0.9 K/MM3 (ref 1.2–5.4)
LYMPHOCYTES NFR BLD AUTO: 18.1 % (ref 13.4–35)
MCHC RBC AUTO-ENTMCNC: 34 % (ref 32–34)
MCV RBC AUTO: 90 FL (ref 84–94)
MONOCYTES # (AUTO): 0.5 K/MM3 (ref 0–0.8)
MONOCYTES % (AUTO): 9.2 % (ref 0–7.3)
PLATELET # BLD: 263 K/MM3 (ref 140–440)
RBC # BLD AUTO: 2.18 M/MM3 (ref 3.65–5.03)

## 2020-01-04 PROCEDURE — 85730 THROMBOPLASTIN TIME PARTIAL: CPT

## 2020-01-04 PROCEDURE — 86850 RBC ANTIBODY SCREEN: CPT

## 2020-01-04 PROCEDURE — 85610 PROTHROMBIN TIME: CPT

## 2020-01-04 PROCEDURE — 80053 COMPREHEN METABOLIC PANEL: CPT

## 2020-01-04 PROCEDURE — 99291 CRITICAL CARE FIRST HOUR: CPT

## 2020-01-04 PROCEDURE — 36415 COLL VENOUS BLD VENIPUNCTURE: CPT

## 2020-01-04 PROCEDURE — 87116 MYCOBACTERIA CULTURE: CPT

## 2020-01-04 PROCEDURE — P9016 RBC LEUKOCYTES REDUCED: HCPCS

## 2020-01-04 PROCEDURE — G0378 HOSPITAL OBSERVATION PER HR: HCPCS

## 2020-01-04 PROCEDURE — 36430 TRANSFUSION BLD/BLD COMPNT: CPT

## 2020-01-04 PROCEDURE — 86920 COMPATIBILITY TEST SPIN: CPT

## 2020-01-04 PROCEDURE — 86900 BLOOD TYPING SEROLOGIC ABO: CPT

## 2020-01-04 PROCEDURE — 86901 BLOOD TYPING SEROLOGIC RH(D): CPT

## 2020-01-04 PROCEDURE — 85025 COMPLETE CBC W/AUTO DIFF WBC: CPT

## 2020-01-04 PROCEDURE — 80048 BASIC METABOLIC PNL TOTAL CA: CPT

## 2020-01-04 PROCEDURE — 82271 OCCULT BLOOD OTHER SOURCES: CPT

## 2020-01-04 NOTE — HISTORY AND PHYSICAL REPORT
History of Present Illness


Date of admission: 


01/04/20 22:12





History of present illness: 


49-year-old man with a history of end-stage renal disease on dialysis Tuesday, 

Thursday, Saturday, hypertension, A. fib on Coumadin comes emergency room for 

evaluation of bleeding.  Patient states that after dialysis his fistula started 

bleeding, they got that under control at dialysis and wrapped his arm.  On his 

way home he had a few drops of blood from his nose, bleeding from the left side 

of his mouth.  He has had no further bleeding since he has been here in the 

emergency room


Review Of  Systems:


Constitutional: no weight loss,  chills, fever


Ears, eyes, nose, mouth and throat: no nasal congestion, no nasal discharge, no 

sinus pressure, blurry vision, diplopia


Neck: No neck pain or rigidity.


Cardiovascular: No  palpitations, chest pain


Respiratory: No  cough, shortness of breath


Gastrointestinal: No hematochezia, abdominal pain


Genitourinary : no dysuria, frequency , hematuria


Musculoskeletal: no muscle ache , joint pain


Integumentary: no rash, no pruritis


Neurological: no parathesias, focal weakness


Endocrine: no cold or heat intolerance, no polyuria or polydipsia


Hematologic/Lymphatic: no easy bruising, no easy bleeding, no gland swelling


Allergic/Immunologic: no urticaria, no angioedema.





PAST MEDICAL HISTORY:hypertension,, A. fib, end-stage renal disease





PAST SURGICAL HISTORY: AV fistula





FAMILY HISTORY:hypertension, diabetes





SOCIAL HISTORY: no tobacco, no  drugs,  alcohol








Medications and Allergies


                                    Allergies











Allergy/AdvReac Type Severity Reaction Status Date / Time


 


No Known Allergies Allergy   Unverified 01/16/17 08:22











                                Home Medications











 Medication  Instructions  Recorded  Confirmed  Last Taken  Type


 


Amiodarone [Cordarone 200 MG TAB] 200 mg PO QDAY 01/04/20 01/04/20 Unknown 

History


 


Cinacalcet [Sensipar] 30 mg PO QDAY 01/04/20 01/04/20 Unknown History


 


Furosemide [Lasix TAB] 80 mg PO QDAY 01/04/20 01/04/20 Unknown History


 


Minoxidil [Loniten] 2.5 mg PO QDAY 01/04/20 01/04/20 Unknown History


 


NIFEdipine [Nifedipine ER] 90 mg PO QDAY 01/04/20 01/04/20 Unknown History


 


Pantoprazole [Protonix] 40 mg PO QDAY 01/04/20 01/04/20 Unknown History


 


Warfarin Sodium 7.5 mg PO QDAY 01/04/20 01/04/20 Unknown History


 


carvediloL [Coreg] 25 mg PO QDAY 01/04/20 01/04/20 Unknown History


 


cloNIDine-TTS PATCH [Catapres-Tts 1 patch TD Q7D 01/04/20 01/04/20 Unknown 

History





0.3mg Patch]     


 


hydrALAZINE [Apresoline TAB] 100 mg PO TID 01/04/20 01/04/20 Unknown History














Exam





- Physical Exam


Narrative exam: 


Gen. appearance: Patient lying in bed, no apparent distress


HEENT: Normocephalic, atraumatic, pupils equally round and reactive to light, 

extraocular movement intact, and no sclericterus,. No JVD or thyromegaly or 

nodule,neck supple, no carotid bruit ,mucous membranes moist, no exudate or 

erythema


Heart: S1, S2, regular rate and rhythm


Lungs: Clear to auscultation bilaterally, breathing comfortable


Abdomen: Positive bowel sounds, nontender, nondistended, no organomegaly


Extremity: No edema, cyanosis, clubbing


Skin: No rash, nodules, warm, dry


Neuro: Oriented 3, cranial nerves II-12 intact, speech is fluent, motor and 

sensory intact











- Constitutional


Vitals: 


                                        











Temp Pulse Resp BP Pulse Ox


 


 98.4 F   84   12   156/86   100 


 


 01/04/20 17:27  01/04/20 21:13  01/04/20 21:13  01/04/20 21:13  01/04/20 21:13














Results





- Labs


CBC & Chem 7: 


                                 01/04/20 18:41





                                 01/04/20 18:41


Labs: 


                              Abnormal lab results











  01/04/20 01/04/20 01/04/20 Range/Units





  18:41 18:41 18:41 


 


RBC  2.18 L    (3.65-5.03)  M/mm3


 


Hgb  6.7 L    (11.8-15.2)  gm/dl


 


Hct  19.7 L*    (35.5-45.6)  %


 


RDW  20.1 H    (13.2-15.2)  %


 


Mono % (Auto)  9.2 H    (0.0-7.3)  %


 


Eos % (Auto)  11.2 H    (0.0-4.3)  %


 


Baso % (Auto)  2.9 H    (0.0-1.8)  %


 


Lymph #  0.9 L    (1.2-5.4)  K/mm3


 


Eos #  0.6 H    (0.0-0.4)  K/mm3


 


PT   42.5 H   (12.2-14.9)  Sec.


 


INR   4.32 H   (0.87-1.13)  


 


APTT   43.6 H   (24.2-36.6)  Sec.


 


Chloride    96.6 L  ()  mmol/L


 


BUN    25 H  (9-20)  mg/dL


 


Creatinine    4.2 H  (0.8-1.5)  mg/dL


 


Glucose    105 H  ()  mg/dL


 


Alkaline Phosphatase    155 H  ()  units/L


 


Crossmatch     














  01/04/20 Range/Units





  22:00 


 


RBC   (3.65-5.03)  M/mm3


 


Hgb   (11.8-15.2)  gm/dl


 


Hct   (35.5-45.6)  %


 


RDW   (13.2-15.2)  %


 


Mono % (Auto)   (0.0-7.3)  %


 


Eos % (Auto)   (0.0-4.3)  %


 


Baso % (Auto)   (0.0-1.8)  %


 


Lymph #   (1.2-5.4)  K/mm3


 


Eos #   (0.0-0.4)  K/mm3


 


PT   (12.2-14.9)  Sec.


 


INR   (0.87-1.13)  


 


APTT   (24.2-36.6)  Sec.


 


Chloride   ()  mmol/L


 


BUN   (9-20)  mg/dL


 


Creatinine   (0.8-1.5)  mg/dL


 


Glucose   ()  mg/dL


 


Alkaline Phosphatase   ()  units/L


 


Crossmatch  See Detail  














Assessment and Plan


Assessment


Bleedinf from fistula


Consult vascular, bleeding stable





Acute blood loss anemia


Transfuse 1 unit of blood


No further bleeding noted


Monitor PT/INR





End-stage renal disease on dialysis


Consult renal





A. fib on Coumadin


INR is supratherapeutic


Hold further Coumadin, monitor PT/INR


DVT prophylaxis

## 2020-01-04 NOTE — EMERGENCY DEPARTMENT REPORT
ED General Adult HPI





- General


Chief complaint: Medical Clearance


Stated complaint: BLEEDING OUT OF MOUTH


Time Seen by Provider: 01/04/20 17:25


Source: patient, EMS


Mode of arrival: Ambulatory


Limitations: No Limitations





- History of Present Illness


Initial comments: 





Patient is a 49-year-old American male with a past history of atrial 

fibrillation on Coumadin as well as end-stage renal disease who is presenting 

with resolved epistaxis.  The patient states after dialysis he had a prolonged 

periods of time where his dialysis AV graft was bleeding.  Pressure was held.  I

walking home patient states that he had some trickling of blood from the nose.  

Patient states he very coughed up a large amount of blood.  This is resolved as 

well.  Patient came to the hospital seeking treatment Y he was bleeding.  He 

denies any chest pain shortness of breath fevers chills nausea vomiting at this 

time.  Patient is no longer having any bleeding per mouth or nose.  He denies 

melena.  Patient states several weeks ago he did have some abdominal pain but 

this resolved as well.





- Related Data


                                Home Medications











 Medication  Instructions  Recorded  Confirmed  Last Taken


 


Cinacalcet 30 mg PO DAILY 12/19/19 12/19/19 Unknown


 


Furosemide 80 mg PO DAILY 12/19/19 12/19/19 Unknown








                                  Previous Rx's











 Medication  Instructions  Recorded  Last Taken  Type


 


Amiodarone [Cordarone 200 MG TAB] 200 mg PO QDAY #30 tablet 12/09/19 Unknown Rx


 


Epoetin Amadeo 10,000 Unit [Procrit] 10,000 unit IV QUITA PRN #1 vial 12/09/19 

Unknown Rx


 


Minoxidil [Loniten] 2.5 mg PO QDAY #30 tablet 12/09/19 Unknown Rx


 


NIFEdipine XL [Procardia Xl] 90 mg PO QDAY #30 tablet 12/09/19 Unknown Rx


 


Pantoprazole [Protonix TAB] 40 mg PO QDAY #30 tablet 12/09/19 Unknown Rx


 


Sucralfate [Carafate] 1 gm PO ACHS #90 tablet 12/09/19 Unknown Rx


 


Warfarin [Coumadin] 7.5 mg PO QDAY #30 tablet 12/09/19 Unknown Rx


 


carvediloL [Coreg] 25 mg PO QDAY #30 tab 12/09/19 Unknown Rx


 


cloNIDine-TTS PATCH [Catapres-Tts 1 patch TD Q7D #4 patch 12/09/19 Unknown Rx





0.3mg Patch]    


 


hydrALAZINE [Apresoline TAB] 100 mg PO TID #90 tab 12/09/19 Unknown Rx











                                    Allergies











Allergy/AdvReac Type Severity Reaction Status Date / Time


 


No Known Allergies Allergy   Unverified 01/16/17 08:22














ED Review of Systems


ROS: 


Stated complaint: BLEEDING OUT OF MOUTH


Other details as noted in HPI





Comment: All other systems reviewed and negative





ED Past Medical Hx





- Past Medical History


Hx Hypertension: Yes (Negative stress test [id number] per pt)


Hx Heart Attack/AMI: No


Hx Renal Disease: Yes (HD TThS. Last HD [id number])





- Surgical History


Additional Surgical History: Left A/V Graft





- Social History


Smoking Status: Never Smoker


Substance Use Type: None





- Medications


Home Medications: 


                                Home Medications











 Medication  Instructions  Recorded  Confirmed  Last Taken  Type


 


Amiodarone [Cordarone 200 MG TAB] 200 mg PO QDAY #30 tablet 12/09/19 12/19/19 

Unknown Rx


 


Epoetin Amadeo 10,000 Unit [Procrit] 10,000 unit IV QUITA PRN #1 vial 12/09/19 12/19/19 Unknown Rx


 


Minoxidil [Loniten] 2.5 mg PO QDAY #30 tablet 12/09/19 12/19/19 Unknown Rx


 


NIFEdipine XL [Procardia Xl] 90 mg PO QDAY #30 tablet 12/09/19 12/19/19 Unknown 

Rx


 


Pantoprazole [Protonix TAB] 40 mg PO QDAY #30 tablet 12/09/19 12/19/19 Unknown 

Rx


 


Sucralfate [Carafate] 1 gm PO ACHS #90 tablet 12/09/19 12/19/19 Unknown Rx


 


Warfarin [Coumadin] 7.5 mg PO QDAY #30 tablet 12/09/19 12/19/19 Unknown Rx


 


carvediloL [Coreg] 25 mg PO QDAY #30 tab 12/09/19 12/19/19 Unknown Rx


 


cloNIDine-TTS PATCH [Catapres-Tts 1 patch TD Q7D #4 patch 12/09/19 12/19/19 Un

known Rx





0.3mg Patch]     


 


hydrALAZINE [Apresoline TAB] 100 mg PO TID #90 tab 12/09/19 12/19/19 Unknown Rx


 


Cinacalcet 30 mg PO DAILY 12/19/19 12/19/19 Unknown History


 


Furosemide 80 mg PO DAILY 12/19/19 12/19/19 Unknown History














ED Physical Exam





- General


Limitations: No Limitations


General appearance: alert, in no apparent distress





- Head


Head exam: Present: atraumatic, normocephalic





- Eye


Eye exam: Present: normal appearance





- ENT


ENT exam: Present: normal orophraynx, mucous membranes moist, other (patient 

bilateral turbinates show some very mild hyperemia but no active bleeding.  

There is no dried blood in the nares.  Patient's saliva is not blood tinged.  

There is no bleeding at the teeth.)





- Neck


Neck exam: Present: normal inspection





- Respiratory


Respiratory exam: Present: normal lung sounds bilaterally.  Absent: respiratory 

distress, wheezes, rales, rhonchi





- Cardiovascular


Cardiovascular Exam: Present: regular rate, normal rhythm.  Absent: systolic 

murmur, diastolic murmur, rubs, gallop





- GI/Abdominal


GI/Abdominal exam: Present: soft, normal bowel sounds.  Absent: distended, 

tenderness, guarding





- Rectal


Rectal exam: Present: deferred, heme (-) stool





- Extremities Exam


Extremities exam: Present: normal inspection





- Back Exam


Back exam: Present: normal inspection





- Neurological Exam


Neurological exam: Present: alert, oriented X3





- Psychiatric


Psychiatric exam: Present: normal affect, normal mood





- Skin


Skin exam: Present: warm, dry, intact, normal color.  Absent: rash





ED Course





                                   Vital Signs











  01/04/20 01/04/20





  17:27 21:13


 


Temperature 98.4 F 


 


Pulse Rate 85 84


 


Respiratory 16 12





Rate  


 


Blood Pressure  156/86


 


Blood Pressure 134/77 





[Right]  


 


O2 Sat by Pulse 100 100





Oximetry  














ED Medical Decision Making





- Lab Data


Result diagrams: 


                                 01/04/20 18:41





                                 01/04/20 18:41








                                   Lab Results











  01/04/20 01/04/20 01/04/20 Range/Units





  18:41 18:41 18:41 


 


WBC  5.2    (4.5-11.0)  K/mm3


 


RBC  2.18 L    (3.65-5.03)  M/mm3


 


Hgb  6.7 L    (11.8-15.2)  gm/dl


 


Hct  19.7 L*    (35.5-45.6)  %


 


MCV  90    (84-94)  fl


 


MCH  31    (28-32)  pg


 


MCHC  34    (32-34)  %


 


RDW  20.1 H    (13.2-15.2)  %


 


Plt Count  263    (140-440)  K/mm3


 


Lymph % (Auto)  18.1    (13.4-35.0)  %


 


Mono % (Auto)  9.2 H    (0.0-7.3)  %


 


Eos % (Auto)  11.2 H    (0.0-4.3)  %


 


Baso % (Auto)  2.9 H    (0.0-1.8)  %


 


Lymph #  0.9 L    (1.2-5.4)  K/mm3


 


Mono #  0.5    (0.0-0.8)  K/mm3


 


Eos #  0.6 H    (0.0-0.4)  K/mm3


 


Baso #  0.1    (0.0-0.1)  K/mm3


 


Seg Neutrophils %  58.6    (40.0-70.0)  %


 


Seg Neutrophils #  3.0    (1.8-7.7)  K/mm3


 


PT   42.5 H   (12.2-14.9)  Sec.


 


INR   4.32 H   (0.87-1.13)  


 


APTT   43.6 H   (24.2-36.6)  Sec.


 


Sodium    137  (137-145)  mmol/L


 


Potassium    4.2  (3.6-5.0)  mmol/L


 


Chloride    96.6 L  ()  mmol/L


 


Carbon Dioxide    26  (22-30)  mmol/L


 


Anion Gap    19  mmol/L


 


BUN    25 H  (9-20)  mg/dL


 


Creatinine    4.2 H  (0.8-1.5)  mg/dL


 


Estimated GFR    18  ml/min


 


BUN/Creatinine Ratio    6  %


 


Glucose    105 H  ()  mg/dL


 


Calcium    9.0  (8.4-10.2)  mg/dL


 


Total Bilirubin    0.40  (0.1-1.2)  mg/dL


 


AST    34  (5-40)  units/L


 


ALT    41  (7-56)  units/L


 


Alkaline Phosphatase    155 H  ()  units/L


 


Total Protein    6.7  (6.3-8.2)  g/dL


 


Albumin    4.0  (3.9-5)  g/dL


 


Albumin/Globulin Ratio    1.5  %














- Medical Decision Making





Patient with a supratherapeutic INR.  This is likely cause of the patient's 

prolonged bleeding of his AV graft as well as the episode of epistaxis.  Patient

 is not actively bleeding at this time.  The patient will be admitted for 

observation to monitor the patient's bleeding.  Also monitor the patient's INR 

returning back to therapeutic range.  Patient's hemoglobin is lower than normal 

for this patient patient will be given 1 unit of packed red blood cells.


Critical Care Time: Yes (30)


Critical care attestation.: 


If time is entered above; I have spent that time in minutes in the direct care 

of this critically ill patient, excluding procedure time.








ED Disposition


Clinical Impression: 


 End-stage renal disease on hemodialysis, Severe anemia, Supratherapeutic INR, 

Anticoagulated on Coumadin





Disposition: DC-09 OP ADMIT IP TO THIS HOSP


Is pt being admited?: Yes


Does the pt Need Aspirin: No


Condition: Stable


Time of Disposition: 22:11

## 2020-01-04 NOTE — EVENT NOTE
ED Screening Note


Date of service: 01/04/20


Time: 17:26


ED Screening Note: 


Pt complains of mouth and oral bleeding x today post dialysis 


denies pain


on coumadin 





This initial assessment/diagnostic orders/clinical plan/treatment(s) is/are 

subject to change based on patients health status, clinical progression and re-

assessment by fellow clinical providers in the ED. Further treatment and workup 

at subsequent clinical providers discretion. Patient/guardian urged not to elope

from the ED as their condition may be serious if not clinically assessed and 

managed. 





Initial orders include: 


labs

## 2020-01-05 VITALS — SYSTOLIC BLOOD PRESSURE: 159 MMHG | DIASTOLIC BLOOD PRESSURE: 85 MMHG

## 2020-01-05 LAB
APTT BLD: 45.1 SEC. (ref 24.2–36.6)
BASOPHILS # (AUTO): 0.1 K/MM3 (ref 0–0.1)
BASOPHILS NFR BLD AUTO: 2 % (ref 0–1.8)
BUN SERPL-MCNC: 41 MG/DL (ref 9–20)
BUN/CREAT SERPL: 7 %
CALCIUM SERPL-MCNC: 9 MG/DL (ref 8.4–10.2)
EOSINOPHIL # BLD AUTO: 0.6 K/MM3 (ref 0–0.4)
EOSINOPHIL NFR BLD AUTO: 12.1 % (ref 0–4.3)
HCT VFR BLD CALC: 22.2 % (ref 35.5–45.6)
HEMOLYSIS INDEX: 3
HGB BLD-MCNC: 7.3 GM/DL (ref 11.8–15.2)
INR PPP: 4.16 (ref 0.87–1.13)
LYMPHOCYTES # BLD AUTO: 1 K/MM3 (ref 1.2–5.4)
LYMPHOCYTES NFR BLD AUTO: 20.6 % (ref 13.4–35)
MCHC RBC AUTO-ENTMCNC: 33 % (ref 32–34)
MCV RBC AUTO: 90 FL (ref 84–94)
MONOCYTES # (AUTO): 0.7 K/MM3 (ref 0–0.8)
MONOCYTES % (AUTO): 14 % (ref 0–7.3)
PLATELET # BLD: 256 K/MM3 (ref 140–440)
RBC # BLD AUTO: 2.47 M/MM3 (ref 3.65–5.03)

## 2020-01-05 NOTE — CONSULTATION
History of Present Illness





- Reason for Consult


Consult date: 01/05/20


Bleeding from Left Arm Arteriovenous Fistula


Requesting physician: EMMETT MARQUEZ





- History of Present Illness





The patient is a 49-year-old male with a history of end-stage renal disease who 

was on hemodialysis through a left arm arteriovenous fistula.  He is on Coumadin

for atrial fibrillation with a supratherapeutic INR greater than 4.  He went to 

hemodialysis on Saturday and underwent dialysis without complications.  He 

states after dialysis he had a prolonged bleeding was initially controlled with 

manual compression.  While he was walking home he became bleeding from his nose 

as well as his fistula which prompted him to present to the emergency 

department.  Upon presentation to the emergency department he was no longer 

bleeding from his nose or his fistula but was found to have the supratherapeutic

INR of 4.25.  He states that he recently moved back to [place] from [place] and

followed up with his vascular surgeon Dr. Elie Piper and had a fistulogram with 

angioplasty performed in his office approximately 2 weeks ago.  He denied having

any prolonged bleeding prior to assistant time but followed up because he had 

not had his fistula evaluated since leaving [place].  He denies any additional 

complications.





Past History


Past Medical History: atrial fib, dialysis, ESRD, heart failure, hypertension, 

stroke


Past Surgical History: Other (left arm AVF with multiple interventions)





Medications and Allergies


                                    Allergies











Allergy/AdvReac Type Severity Reaction Status Date / Time


 


No Known Allergies Allergy   Unverified 01/16/17 08:22











                                Home Medications











 Medication  Instructions  Recorded  Confirmed  Last Taken  Type


 


Amiodarone [Cordarone 200 MG TAB] 200 mg PO QDAY 01/04/20 01/04/20 Unknown 

History


 


Cinacalcet [Sensipar] 30 mg PO QDAY 01/04/20 01/04/20 Unknown History


 


Furosemide [Lasix TAB] 80 mg PO QDAY 01/04/20 01/04/20 Unknown History


 


Minoxidil [Loniten] 2.5 mg PO QDAY 01/04/20 01/04/20 Unknown History


 


NIFEdipine [Nifedipine ER] 90 mg PO QDAY 01/04/20 01/04/20 Unknown History


 


Pantoprazole [Protonix] 40 mg PO QDAY 01/04/20 01/04/20 Unknown History


 


Warfarin Sodium 7.5 mg PO QDAY 01/04/20 01/04/20 Unknown History


 


carvediloL [Coreg] 25 mg PO QDAY 01/04/20 01/04/20 Unknown History


 


cloNIDine-TTS PATCH [Catapres-Tts 1 patch TD Q7D 01/04/20 01/04/20 Unknown 

History





0.3mg Patch]     


 


hydrALAZINE [Apresoline TAB] 100 mg PO TID 01/04/20 01/04/20 Unknown History











Active Meds: 


Active Medications





Acetaminophen (Tylenol)  650 mg PO Q4H PRN


   PRN Reason: Pain MILD(1-3)/Fever >100.5/HA


Ondansetron HCl (Zofran)  4 mg IV Q8H PRN


   PRN Reason: Nausea And Vomiting


Sodium Chloride (Sodium Chloride Flush Syringe 10 Ml)  10 ml IV BID ARRON


   Last Admin: 01/05/20 09:44 Dose:  10 ml


   Documented by: 


Sodium Chloride (Sodium Chloride Flush Syringe 10 Ml)  10 ml IV PRN PRN


   PRN Reason: LINE FLUSH











Review of Systems


All systems: negative





Exam





- Constitutional


Vitals: 


                                        











Temp Pulse Resp BP Pulse Ox


 


 98.0 F   85   19   159/85   98 


 


 01/05/20 10:42  01/05/20 10:42  01/05/20 10:42  01/05/20 10:42  01/05/20 10:42











General appearance: Present: no acute distress





- Neck


Neck: Present: supple





- Respiratory


Respiratory effort: normal





- Cardiovascular


Rhythm: irregularly irregular





- Extremities


Extremities: No edema, abnormal (Left arm AVF with 2 moderately sized 

pseudoanuerysms without ulcerations, palpable thrill without pulsatility)





Results





- Labs


CBC & Chem 7: 


                                 01/05/20 07:19





                                 01/05/20 07:19


Labs: 


                              Abnormal lab results











  01/04/20 01/04/20 01/04/20 Range/Units





  18:41 18:41 18:41 


 


RBC  2.18 L    (3.65-5.03)  M/mm3


 


Hgb  6.7 L    (11.8-15.2)  gm/dl


 


Hct  19.7 L*    (35.5-45.6)  %


 


RDW  20.1 H    (13.2-15.2)  %


 


Mono % (Auto)  9.2 H    (0.0-7.3)  %


 


Eos % (Auto)  11.2 H    (0.0-4.3)  %


 


Baso % (Auto)  2.9 H    (0.0-1.8)  %


 


Lymph #  0.9 L    (1.2-5.4)  K/mm3


 


Eos #  0.6 H    (0.0-0.4)  K/mm3


 


PT   42.5 H   (12.2-14.9)  Sec.


 


INR   4.32 H   (0.87-1.13)  


 


APTT   43.6 H   (24.2-36.6)  Sec.


 


Sodium     (137-145)  mmol/L


 


Chloride    96.6 L  ()  mmol/L


 


BUN    25 H  (9-20)  mg/dL


 


Creatinine    4.2 H  (0.8-1.5)  mg/dL


 


Glucose    105 H  ()  mg/dL


 


Alkaline Phosphatase    155 H  ()  units/L


 


Crossmatch     














  01/04/20 01/05/20 01/05/20 Range/Units





  22:00 07:19 07:19 


 


RBC   2.47 L   (3.65-5.03)  M/mm3


 


Hgb   7.3 L   (11.8-15.2)  gm/dl


 


Hct   22.2 L   (35.5-45.6)  %


 


RDW   18.0 H   (13.2-15.2)  %


 


Mono % (Auto)   14.0 H   (0.0-7.3)  %


 


Eos % (Auto)   12.1 H   (0.0-4.3)  %


 


Baso % (Auto)   2.0 H   (0.0-1.8)  %


 


Lymph #   1.0 L   (1.2-5.4)  K/mm3


 


Eos #   0.6 H   (0.0-0.4)  K/mm3


 


PT    41.2 H  (12.2-14.9)  Sec.


 


INR    4.16 H  (0.87-1.13)  


 


APTT    45.1 H  (24.2-36.6)  Sec.


 


Sodium     (137-145)  mmol/L


 


Chloride     ()  mmol/L


 


BUN     (9-20)  mg/dL


 


Creatinine     (0.8-1.5)  mg/dL


 


Glucose     ()  mg/dL


 


Alkaline Phosphatase     ()  units/L


 


Crossmatch  See Detail    














  01/05/20 Range/Units





  07:19 


 


RBC   (3.65-5.03)  M/mm3


 


Hgb   (11.8-15.2)  gm/dl


 


Hct   (35.5-45.6)  %


 


RDW   (13.2-15.2)  %


 


Mono % (Auto)   (0.0-7.3)  %


 


Eos % (Auto)   (0.0-4.3)  %


 


Baso % (Auto)   (0.0-1.8)  %


 


Lymph #   (1.2-5.4)  K/mm3


 


Eos #   (0.0-0.4)  K/mm3


 


PT   (12.2-14.9)  Sec.


 


INR   (0.87-1.13)  


 


APTT   (24.2-36.6)  Sec.


 


Sodium  134 L  (137-145)  mmol/L


 


Chloride  94.6 L  ()  mmol/L


 


BUN  41 H  (9-20)  mg/dL


 


Creatinine  5.7 H  (0.8-1.5)  mg/dL


 


Glucose   ()  mg/dL


 


Alkaline Phosphatase   ()  units/L


 


Crossmatch   














Assessment and Plan





The patient is a 49-year-old male who presented with bleeding from his left arm 

AV fistula secondary to a supratherapeutic INR.  He recently had intervention 

from his vascular surgeon on his left arm arteriovenous fistula.  He has no 

evidence of venous outflow obstruction.  There is no need for vascular surgery 

intervention at this time.

## 2020-01-05 NOTE — DISCHARGE SUMMARY
Providers





- Providers


Date of Admission: 


01/04/20 22:12





Date of discharge: 01/05/20


Attending physician: 


BEAN MAZARIEGOS





                                        





01/04/20 22:40


Consult to Physician [CONS] Routine 


   Comment: 


   Consulting Provider: TAWANNA BRUNNER


   Physician Instructions: 


   Reason For Exam: hd





01/05/20 06:17


Consult to Physician [CONS] Routine 


   Comment: 


   Consulting Provider: MARY BLANKENSHIP


   Physician Instructions: 


   Reason For Exam: bleeding from fistula











Primary care physician: 


[facility], [place]








Hospitalization


Condition: Stable


Hospital course: 


Patient is a 49-year-old man with a history of end-stage renal disease on 

dialysis TTS, hypertension and A. fib on Coumadin comes emergency room for 

evaluation of bleeding from left arm AVF due to supratherapeutic INR





ESRD HD AVF malfunction/Bleeding


due to supratherapeutic INR


Consult vascular, input noted


bleeding stopped





Acute blood loss anemia


Transfuse 1 unit of blood


No further bleeding noted


Monitor PT/INR





End-stage renal disease on dialysis


Consult renal





A. fib on Coumadin


INR is supratherapeutic


Hold further Coumadin, monitor PT/INR


DVT prophylaxis, already protected


Disposition: DC-01 TO HOME OR SELFCARE


Time spent for discharge: 35 minutes





Core Measure Documentation





- Palliative Care


Palliative Care/ Comfort Measures: Not Applicable





- Core Measures


Any of the following diagnoses?: none





- VTE Discharge Requirements


Deep Vein Thrombosis/Pulmonary Embolism Present on Admission: No


Has pt received <5 days of overlap therapy or INR<2.0: No


Anticoagulant overlap therapy prescribed at discharge: No


Contraindication No Overlap Therapy order at DC: Not Indicated





Exam





- Physical Exam


Narrative exam: 


Gen: WDWN, NAD, Awake, Alert, Orientated 


HEENT: NCAT, EOMI, PERRL, OP Clear 


Neck: supple, no adenopathy, no thyromegaly, no JVD 


CVS/Heart: RRR, normal S1S2, pulses present bilaterally 


Chest/Lungs: CTA B, Symmetrical chest expansion, good air entry bilaterally 


GI/Abdomen: soft, NTND, good bowel sounds, no guarding or rebound 


/Bladder: no suprapubic tenderness, no CVA or paraspinal tenderness 


Extermity/Skin: no c/c/e, no obvious rash 


MSK: FROM x 4 








- Constitutional


Vitals: 


                                        











Temp Pulse Resp BP Pulse Ox


 


 98.0 F   85   19   159/85   98 


 


 01/05/20 10:42  01/05/20 10:42  01/05/20 10:42  01/05/20 10:42  01/05/20 10:42














Plan


Additional Instructions: DO NOT Warfarin.  keep holding Warfarin until you see 

[facility] doctor


Follow [facility]: 


[facility] [[facility] - 3 Days

## 2020-01-05 NOTE — PROGRESS NOTE
Assessment and Plan


Assessment and plan: 


Patient is a 49-year-old man with a history of end-stage renal disease on 

dialysis TTS, hypertension and A. fib on Coumadin comes emergency room for 

evaluation of bleeding from left arm AVF due to supratherapeutic INR





ESRD HD AVF malfunction/Bleeding


due to supratherapeutic INR


Consult vascular, input noted


bleeding stopped





Acute blood loss anemia


Transfuse 1 unit of blood


No further bleeding noted


Monitor PT/INR





End-stage renal disease on dialysis


Consult renal





A. fib on Coumadin


INR is supratherapeutic


Hold further Coumadin, monitor PT/INR


DVT prophylaxis, already protected





History


Interval history: 


Patient was seen and examined. Follow-up on current diagnosis. Overnight 

uneventful as no events directly reported to me. Patient denies any chest pain, 

shortness breath, nausea/vomiting or severe headaches. Imaging, nursing note, 

chart, labs and old chart reviewed. Discussed with patient. 








Gen: WDWN, NAD, Awake, Alert, Orientated 


HEENT: NCAT, EOMI, PERRL, OP Clear 


Neck: supple, no adenopathy, no thyromegaly, no JVD 


CVS/Heart: RRR, normal S1S2, pulses present bilaterally 


Chest/Lungs: CTA B, Symmetrical chest expansion, good air entry bilaterally 


GI/Abdomen: soft, NTND, good bowel sounds, no guarding or rebound 


/Bladder: no suprapubic tenderness, no CVA or paraspinal tenderness 


Extermity/Skin: no c/c/e, no obvious rash 


MSK: FROM x 4 


Neuro: CN 2-12 grossly intact, no new focal deficits 


Psych: calm 











Hospitalist Physical





- Constitutional


Vitals: 


                                        











Temp Pulse Resp BP Pulse Ox


 


 98.0 F   85   19   159/85   98 


 


 01/05/20 10:42  01/05/20 10:42  01/05/20 10:42  01/05/20 10:42  01/05/20 10:42











General appearance: Present: no acute distress





Results





- Labs


CBC & Chem 7: 


                                 01/05/20 07:19





                                 01/05/20 07:19


Labs: 


                             Laboratory Last Values











WBC  4.8 K/mm3 (4.5-11.0)   01/05/20  07:19    


 


RBC  2.47 M/mm3 (3.65-5.03)  L  01/05/20  07:19    


 


Hgb  7.3 gm/dl (11.8-15.2)  L  01/05/20  07:19    


 


Hct  22.2 % (35.5-45.6)  L  01/05/20  07:19    


 


MCV  90 fl (84-94)   01/05/20  07:19    


 


MCH  29 pg (28-32)   01/05/20  07:19    


 


MCHC  33 % (32-34)   01/05/20  07:19    


 


RDW  18.0 % (13.2-15.2)  H  01/05/20  07:19    


 


Plt Count  256 K/mm3 (140-440)   01/05/20  07:19    


 


Lymph % (Auto)  20.6 % (13.4-35.0)   01/05/20  07:19    


 


Mono % (Auto)  14.0 % (0.0-7.3)  H  01/05/20  07:19    


 


Eos % (Auto)  12.1 % (0.0-4.3)  H  01/05/20  07:19    


 


Baso % (Auto)  2.0 % (0.0-1.8)  H  01/05/20  07:19    


 


Lymph #  1.0 K/mm3 (1.2-5.4)  L  01/05/20  07:19    


 


Mono #  0.7 K/mm3 (0.0-0.8)   01/05/20  07:19    


 


Eos #  0.6 K/mm3 (0.0-0.4)  H  01/05/20  07:19    


 


Baso #  0.1 K/mm3 (0.0-0.1)   01/05/20  07:19    


 


Seg Neutrophils %  51.3 % (40.0-70.0)   01/05/20  07:19    


 


Seg Neutrophils #  2.4 K/mm3 (1.8-7.7)   01/05/20  07:19    


 


PT  41.2 Sec. (12.2-14.9)  H  01/05/20  07:19    


 


INR  4.16  (0.87-1.13)  H  01/05/20  07:19    


 


APTT  45.1 Sec. (24.2-36.6)  H  01/05/20  07:19    


 


Sodium  134 mmol/L (137-145)  L  01/05/20  07:19    


 


Potassium  4.2 mmol/L (3.6-5.0)   01/05/20  07:19    


 


Chloride  94.6 mmol/L ()  L  01/05/20  07:19    


 


Carbon Dioxide  24 mmol/L (22-30)   01/05/20  07:19    


 


Anion Gap  20 mmol/L  01/05/20  07:19    


 


BUN  41 mg/dL (9-20)  H  01/05/20  07:19    


 


Creatinine  5.7 mg/dL (0.8-1.5)  H  01/05/20  07:19    


 


Estimated GFR  13 ml/min  01/05/20  07:19    


 


BUN/Creatinine Ratio  7 %  01/05/20  07:19    


 


Glucose  86 mg/dL ()   01/05/20  07:19    


 


Calcium  9.0 mg/dL (8.4-10.2)   01/05/20  07:19    


 


Total Bilirubin  0.40 mg/dL (0.1-1.2)   01/04/20  18:41    


 


AST  34 units/L (5-40)   01/04/20  18:41    


 


ALT  41 units/L (7-56)   01/04/20  18:41    


 


Alkaline Phosphatase  155 units/L ()  H  01/04/20  18:41    


 


Total Protein  6.7 g/dL (6.3-8.2)   01/04/20  18:41    


 


Albumin  4.0 g/dL (3.9-5)   01/04/20  18:41    


 


Albumin/Globulin Ratio  1.5 %  01/04/20  18:41    


 


Blood Type  O POSITIVE   01/04/20  22:00    


 


Antibody Screen  Negative   01/04/20  22:00    


 


Crossmatch  See Detail   01/04/20  22:00    














Active Medications





- Current Medications


Current Medications: 














Generic Name Dose Route Start Last Admin





  Trade Name Freq  PRN Reason Stop Dose Admin


 


Acetaminophen  650 mg  01/04/20 22:40 





  Tylenol  PO  





  Q4H PRN  





  Pain MILD(1-3)/Fever >100.5/HA  


 


Ondansetron HCl  4 mg  01/04/20 22:40 





  Zofran  IV  





  Q8H PRN  





  Nausea And Vomiting  


 


Sodium Chloride  10 ml  01/05/20 10:00  01/05/20 09:44





  Sodium Chloride Flush Syringe 10 Ml  IV   10 ml





  BID ARRON   Administration


 


Sodium Chloride  10 ml  01/04/20 22:40 





  Sodium Chloride Flush Syringe 10 Ml  IV  





  PRN PRN  





  LINE FLUSH

## 2020-02-25 ENCOUNTER — HOSPITAL ENCOUNTER (OUTPATIENT)
Dept: HOSPITAL 5 - ED | Age: 50
Setting detail: OBSERVATION
LOS: 1 days | Discharge: HOME | End: 2020-02-26
Attending: INTERNAL MEDICINE | Admitting: INTERNAL MEDICINE
Payer: MEDICARE

## 2020-02-25 DIAGNOSIS — I16.0: Primary | ICD-10-CM

## 2020-02-25 DIAGNOSIS — M54.5: ICD-10-CM

## 2020-02-25 DIAGNOSIS — I12.0: ICD-10-CM

## 2020-02-25 DIAGNOSIS — Z99.2: ICD-10-CM

## 2020-02-25 DIAGNOSIS — J81.0: ICD-10-CM

## 2020-02-25 DIAGNOSIS — J81.1: ICD-10-CM

## 2020-02-25 DIAGNOSIS — N18.6: ICD-10-CM

## 2020-02-25 DIAGNOSIS — E83.52: ICD-10-CM

## 2020-02-25 LAB
BASOPHILS # (AUTO): 0.1 K/MM3 (ref 0–0.1)
BASOPHILS NFR BLD AUTO: 1.1 % (ref 0–1.8)
BUN SERPL-MCNC: 55 MG/DL (ref 9–20)
BUN/CREAT SERPL: 5 %
CALCIUM SERPL-MCNC: 12.2 MG/DL (ref 8.4–10.2)
EOSINOPHIL # BLD AUTO: 0.6 K/MM3 (ref 0–0.4)
EOSINOPHIL NFR BLD AUTO: 6.4 % (ref 0–4.3)
HCT VFR BLD CALC: 25.9 % (ref 35.5–45.6)
HEMOLYSIS INDEX: 9
HGB BLD-MCNC: 8.6 GM/DL (ref 11.8–15.2)
LYMPHOCYTES # BLD AUTO: 1 K/MM3 (ref 1.2–5.4)
LYMPHOCYTES NFR BLD AUTO: 11.5 % (ref 13.4–35)
MCHC RBC AUTO-ENTMCNC: 33 % (ref 32–34)
MCV RBC AUTO: 90 FL (ref 84–94)
MONOCYTES # (AUTO): 0.9 K/MM3 (ref 0–0.8)
MONOCYTES % (AUTO): 9.6 % (ref 0–7.3)
PLATELET # BLD: 262 K/MM3 (ref 140–440)
RBC # BLD AUTO: 2.88 M/MM3 (ref 3.65–5.03)

## 2020-02-25 PROCEDURE — 80048 BASIC METABOLIC PNL TOTAL CA: CPT

## 2020-02-25 PROCEDURE — 96376 TX/PRO/DX INJ SAME DRUG ADON: CPT

## 2020-02-25 PROCEDURE — 93010 ELECTROCARDIOGRAM REPORT: CPT

## 2020-02-25 PROCEDURE — 84165 PROTEIN E-PHORESIS SERUM: CPT

## 2020-02-25 PROCEDURE — 82330 ASSAY OF CALCIUM: CPT

## 2020-02-25 PROCEDURE — 83970 ASSAY OF PARATHORMONE: CPT

## 2020-02-25 PROCEDURE — 96374 THER/PROPH/DIAG INJ IV PUSH: CPT

## 2020-02-25 PROCEDURE — 80074 ACUTE HEPATITIS PANEL: CPT

## 2020-02-25 PROCEDURE — 71046 X-RAY EXAM CHEST 2 VIEWS: CPT

## 2020-02-25 PROCEDURE — 36415 COLL VENOUS BLD VENIPUNCTURE: CPT

## 2020-02-25 PROCEDURE — 93005 ELECTROCARDIOGRAM TRACING: CPT

## 2020-02-25 PROCEDURE — G0257 UNSCHED DIALYSIS ESRD PT HOS: HCPCS

## 2020-02-25 PROCEDURE — 74176 CT ABD & PELVIS W/O CONTRAST: CPT

## 2020-02-25 PROCEDURE — 86334 IMMUNOFIX E-PHORESIS SERUM: CPT

## 2020-02-25 PROCEDURE — 99285 EMERGENCY DEPT VISIT HI MDM: CPT

## 2020-02-25 PROCEDURE — 85025 COMPLETE CBC W/AUTO DIFF WBC: CPT

## 2020-02-25 PROCEDURE — G0378 HOSPITAL OBSERVATION PER HR: HCPCS

## 2020-02-25 PROCEDURE — 72100 X-RAY EXAM L-S SPINE 2/3 VWS: CPT

## 2020-02-25 PROCEDURE — 96372 THER/PROPH/DIAG INJ SC/IM: CPT

## 2020-02-25 RX ADMIN — FUROSEMIDE SCH MG: 40 TABLET ORAL at 13:18

## 2020-02-25 RX ADMIN — NIFEDIPINE SCH MG: 90 TABLET, EXTENDED RELEASE ORAL at 12:30

## 2020-02-25 RX ADMIN — PANTOPRAZOLE SODIUM SCH MG: 40 TABLET, DELAYED RELEASE ORAL at 12:30

## 2020-02-25 RX ADMIN — Medication SCH ML: at 22:03

## 2020-02-25 RX ADMIN — HYDRALAZINE HYDROCHLORIDE PRN MG: 20 INJECTION INTRAMUSCULAR; INTRAVENOUS at 10:59

## 2020-02-25 RX ADMIN — HYDRALAZINE HYDROCHLORIDE SCH MG: 100 TABLET, FILM COATED ORAL at 20:25

## 2020-02-25 RX ADMIN — HYDRALAZINE HYDROCHLORIDE PRN MG: 20 INJECTION INTRAMUSCULAR; INTRAVENOUS at 16:22

## 2020-02-25 RX ADMIN — HEPARIN SODIUM SCH UNIT: 5000 INJECTION, SOLUTION INTRAVENOUS; SUBCUTANEOUS at 22:01

## 2020-02-25 RX ADMIN — HYDRALAZINE HYDROCHLORIDE SCH MG: 100 TABLET, FILM COATED ORAL at 13:15

## 2020-02-25 NOTE — EVENT NOTE
Date: 02/25/20








Nephrology: Dr. Naranjo





Patient is a 49-year-old gentleman, on Coumadin therapy, end-stage renal disease

on dialysis, presenting to the ER with nontraumatic paralumbar back pain for the

past few days.  There is no abdominal pain.





Patient sitting on his side.  Check basic laboratory studies, x-ray the chest, 

EKG, noncontrast CT scan of the abdomen pelvis to exclude AAA, and 

retroperitoneal hematoma.


                                   Vital Signs











  02/25/20





  04:25


 


Temperature 98.6 F


 


Pulse Rate 85


 


Blood Pressure 161/86


 


O2 Sat by Pulse 98





Oximetry

## 2020-02-25 NOTE — CONSULTATION
History of Present Illness





- Reason for Consult


Consult date: 02/25/20


end stage renal disease





- History of Present Illness





Mr. Barker is a 48yo with ESRD on HD TTS who presented to the ED with complaint

of back pain.  He reports that back pain began on Friday following transfusion 

of pRBC.  He denies associated symptoms.  Presently, patient denies back pain.  

He denies chest pain, SOB.  He denies abdominal pain, nausea/vomiting.





Past History


Past Medical History: atrial fib, anemia, ESRD, hypertension


Past Surgical History: Other (AVF creation)


Social history: no significant social history


Family history: no significant family history





Medications and Allergies


                                    Allergies











Allergy/AdvReac Type Severity Reaction Status Date / Time


 


No Known Allergies Allergy   Verified 02/25/20 04:29











                                Home Medications











 Medication  Instructions  Recorded  Confirmed  Last Taken  Type


 


Cinacalcet [Sensipar] 30 mg PO QDAY 01/04/20 02/25/20 Unknown History


 


Furosemide [Lasix TAB] 80 mg PO QDAY 01/04/20 02/25/20 Unknown History


 


NIFEdipine [Nifedipine ER] 90 mg PO QDAY 01/04/20 02/25/20 Unknown History


 


Pantoprazole [Protonix TAB] 40 mg PO QDAY 01/04/20 02/25/20 Unknown History


 


carvediloL [Coreg] 25 mg PO QDAY 01/04/20 02/25/20 Unknown History


 


hydrALAZINE [Apresoline TAB] 100 mg PO TID 01/04/20 02/25/20 Unknown History


 


cloNIDine [Catapres] 0.1 mg PO BID #60 tablet 02/26/20  Unknown Rx











Active Meds: 


Active Medications





Epoetin Amadeo (Procrit)  10,000 unit IV QUITA PRN


   PRN Reason: hemodialysis


Sodium Chloride (Nacl 0.9%)  100 mls @ 999 mls/hr IV QUITA PRN


   PRN Reason: Hypotension











Review of Systems


All systems: negative





Exam





- Vital Signs


Vital signs: 


                                   Vital Signs











Temp Pulse BP Pulse Ox


 


 98.6 F   85   161/86   98 


 


 02/25/20 04:25  02/25/20 04:25  02/25/20 04:25  02/25/20 04:25














- General Appearance


General appearance: well-developed, well-nourished


EENT: ATNC


Respiratory: Clear to Ascultation


Heart: regular, S1S2


Gastrointestinal: Present: normal.  Absent: tenderness, distended


Integumentary: no rash, warm and dry


Neurologic: no focal deficit, alert and oriented x3


Psychiatric: cooperative





Results





- Lab Results





                                 02/25/20 04:59





                                 02/25/20 04:59


                             Most recent lab results











Calcium  12.2 mg/dL (8.4-10.2)  H*  02/25/20  04:59    














Assessment and Plan


Impression:


* End stage renal disease


* Back pain


* Anemia - recent hx of transfusion


* Hypercalcemia


* Atrial fibrillation on chronic anticoagulation


* Hypertension


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


* Medical noncompliance





Plan:


* Continue HD TTS schedule - HD today


* UF as tolerated


* Unclear etiology of hypercalcemia work up ordered - iCa,PTH; will order SIFE, 

  SPEP in setting of anemia and back pain


* Adjust Ca bath with dialysis


* Continue antiHTN medications


* Epogen TIW prn


* Renal diet


* Dose medications for renal function

## 2020-02-25 NOTE — XRAY REPORT
CHEST PA AND LATERAL VIEWS



INDICATION: 

royal.



COMPARISON: 

12/20/2019



FINDINGS:



Support devices: None.



Heart: Enlarged, unchanged 



Lungs/Pleura: There is bilateral pulmonary edema. No pleural effusion.  







IMPRESSION:

1. Cardiomegaly with pulmonary edema.



Signer Name: Ty Aguilar MD 

Signed: 2/25/2020 5:13 AM

 Workstation Name: [id number]

## 2020-02-25 NOTE — CAT SCAN REPORT
CT ABDOMEN AND PELVIS WITHOUT IV CONTRAST



INDICATION:

lower back pain with roayl.



COMPARISON:

CT 12/2/2019



TECHNIQUE:

All CT scans at this facility use dose modulation, automated exposure control, iterative reconstructi
on or weight based dosing, when appropriate, to reduce radiation dose to as low as reasonably achieva
ble.



FINDINGS:



Lung Bases: There is significant cardiomegaly. Bilateral pulmonary edema is noted.



Skeletal System: No acute abnormality.



ABDOMEN:

Liver: No significant abnormality.

Gallbladder: There is mild gallbladder wall edema.  

Bile Ducts: No significant abnormality.

Pancreas: No significant abnormality.

Spleen: No significant abnormality.

Adrenals: No significant abnormality.

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 There is diffuse cortical
 thinning and numerous cysts involving both kidneys. No hydronephrosis.

Upper GI tract: No significant abnormality.

Lymph Nodes: No significant adenopathy.

Aorta: No significant abnormality. 

Additional Findings: No significant abnormality.



PELVIS:

Colon: No acute abnormality.

Urinary Bladder and Distal Ureters: No significant abnormality.

Appendix: Not visualized.  

Lymph Nodes: No significant adenopathy.

Additional Findings: There is trace pelvic ascites. This is similar to the prior.





IMPRESSION:

1.  Within the limitations of non contrast technique, no acute process in the abdomen or pelvis.

2.  Cardiomegaly with pulmonary edema.

3. Incidental findings as above.



Signer Name: yT Aguilar MD 

Signed: 2/25/2020 5:21 AM

 Workstation Name: [id number]

## 2020-02-25 NOTE — EMERGENCY DEPARTMENT REPORT
HPI





- General


Chief Complaint: Back Pain/Injury


Time Seen by Provider: 02/25/20 06:02





- HPI


HPI: 





49-year-old -American male presents to the emergency department with a 

complaint of a 4 to 5-day history of some low back pain since he received a bloo

d transfusion on Friday.  Pain started once he returned home from the 

transfusion.  He denies any recent fall, injury or obvious inciting event.  He 

denies any problems with bowel movements, numbness or paresthesias or any 

neurological deficits.  The patient has not taken anything for his symptoms p

rior to presentation.  He has end-stage renal disease on hemodialysis on 

Tuesday/Thursday/Saturday and did have dialysis done on Saturday.  He also has a

past medical history of hypertension, glaucoma, atrial fibrillation.  His 

nephrologist is Dr. Naranjo.





ED Past Medical Hx





- Past Medical History


Previous Medical History?: Yes


Hx Hypertension: Yes


Hx Heart Attack/AMI: No


Hx Renal Disease: Yes (HD TThS. Last HD)


Additional medical history: Glaucoma.  afib





- Surgical History


Past Surgical History?: Yes


Additional Surgical History: Left A/V Graft





- Social History


Smoking Status: Never Smoker


Substance Use Type: None





- Medications


Home Medications: 


                                Home Medications











 Medication  Instructions  Recorded  Confirmed  Last Taken  Type


 


Cinacalcet [Sensipar] 30 mg PO QDAY 01/04/20 02/25/20 Unknown History


 


Furosemide [Lasix TAB] 80 mg PO QDAY 01/04/20 02/25/20 Unknown History


 


NIFEdipine [Nifedipine ER] 90 mg PO QDAY 01/04/20 02/25/20 Unknown History


 


Pantoprazole [Protonix TAB] 40 mg PO QDAY 01/04/20 02/25/20 Unknown History


 


carvediloL [Coreg] 25 mg PO QDAY 01/04/20 02/25/20 Unknown History


 


hydrALAZINE [Apresoline TAB] 100 mg PO TID 01/04/20 02/25/20 Unknown History














ED Review of Systems


ROS: 


Stated complaint: BACK PAIN


Other details as noted in HPI





Comment: All other systems reviewed and negative


Constitutional: denies: chills, fever


Eyes: denies: eye pain, vision change


ENT: denies: ear pain, throat pain


Respiratory: cough, shortness of breath


Cardiovascular: denies: chest pain, palpitations


Gastrointestinal: denies: abdominal pain, vomiting


Genitourinary: denies: dysuria, discharge


Musculoskeletal: back pain.  denies: arthralgia


Skin: denies: rash, lesions


Neurological: denies: headache, weakness





Physical Exam





- Physical Exam


Vital Signs: 


                                   Vital Signs











  02/25/20 02/25/20 02/25/20





  04:25 05:27 05:36


 


Temperature 98.6 F 98.6 F 


 


Pulse Rate 85 85 


 


Respiratory  20 20





Rate   


 


Blood Pressure 161/86  


 


Blood Pressure  159/85 





[Right]   


 


O2 Sat by Pulse 98 95 95





Oximetry   











Physical Exam: 





GENERAL: The patient is well-developed well-nourished.


HENT: Normocephalic.  Atraumatic.    Patient has moist mucous membranes.


EYES: Extraocular motions are intact.  


NECK: Supple. Trachea is midline.


CHEST/LUNGS: Coarse breath sounds throughout the chest.  No tachypnea or 

accessory muscle use.  There is no respiratory distress noted.


HEART/CARDIOVASCULAR: Regular.  There is no tachycardia.  There is no murmur.


ABDOMEN: Abdomen is soft, nontender.  Patient has normal bowel sounds.  There is

 no abdominal distention.


SKIN: Skin is warm and dry. 


NEURO: The patient is awake, alert, and oriented.  The patient is cooperative.  

The patient has no focal neurologic deficits.  Normal speech.


MUSCULOSKELETAL: There is no tenderness or deformity.  There is no evidence of 

acute injury.


BACK: No midline thoracic or lumbar tenderness to palpation, step-off or 

deformity.  There is some low lumbar bilateral paraspinal tenderness to 

palpation.





ED Course


                                   Vital Signs











  02/25/20 02/25/20 02/25/20





  04:25 05:27 05:36


 


Temperature 98.6 F 98.6 F 


 


Pulse Rate 85 85 


 


Respiratory  20 20





Rate   


 


Blood Pressure 161/86  


 


Blood Pressure  159/85 





[Right]   


 


O2 Sat by Pulse 98 95 95





Oximetry   














- Consultations


Consultation #1: 


Earlier in the day I spoke with the nephrologist on-call for [facility], 

Doctor Stuart, who is aware of the patient's presentation and my plan for 

admission and she will place dialysis orders.


02/25/20 13:35








ED Medical Decision Making





- Lab Data


Result diagrams: 


                                 02/25/20 04:59





                                 02/25/20 04:59





- EKG Data


-: EKG Interpreted by Me


EKG shows normal: sinus rhythm, axis, intervals (Mild prolongation of [place] 

interval), QRS complexes (LVH), ST-T waves


Rate: normal





- EKG Data


When compared to previous EKG there are: no significant change


Interpretation: unchanged when compared t (12/18/19)





- Radiology Data


Radiology results: report reviewed, image reviewed


interpreted by me: 





Chest x-ray shows some cardiomegaly and pulmonary vascular congestion with mild 

interstitial edema.





CT ABDOMEN AND PELVIS WITHOUT IV CONTRAST INDICATION: lower back pain with royal. 

COMPARISON: CT 12/2/2019 TECHNIQUE: All CT scans at this facility use dose 

modulation, automated exposure control, iterative reconstruction or weight based

 dosing, when appropriate, to reduce radiation dose to as low as reasonably 

achievable. FINDINGS: Lung Bases: There is significant cardiomegaly. Bilateral 

pulmonary edema is noted. Skeletal System: No acute abnormality. ABDOMEN: Liver:

 No significant abnormality. Gallbladder: There is mild gallbladder wall edema. 

Bile Ducts: No significant abnormality. Pancreas: No significant abnormality. 

Spleen: No significant abnormality. Adrenals: No significant abnormality. Ki

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 There 

is diffuse cortical thinning and numerous cysts involving both kidneys. No 

hydronephrosis. Upper GI tract: No significant abnormality. Lymph Nodes: No 

significant adenopathy. Aorta: No significant abnormality. Additional Findings: 

No significant abnormality. PELVIS: Colon: No acute abnormality. Urinary Bladder

 and Distal Ureters: No significant abnormality. Appendix: Not visualized. Lymph

 Nodes: No significant adenopathy. Additional Findings: There is trace pelvic 

ascites. This is similar to the prior. IMPRESSION: 1. Within the limitations of 

non contrast technique, no acute process in the abdomen or pelvis. 2. C

ardiomegaly with pulmonary edema. 3. Incidental findings as above. Signer Name: 

Ty Aguilar MD 





- Medical Decision Making





This patient presented with the complaint of some low back pain that is been 

going on for the past 4 to 5 days.  He has some reproducible lower lumbar 

bilateral paraspinal tenderness but there is no midline thoracic or lumbar 

tenderness to palpation, step-off or deformity.  There was no trauma or injury, 

and along with the fact that there is no midline tenderness, I did not feel that

 any imaging was necessary of his back or spine at this time.  During my initial

 examination, the patient was sleeping in the room but was found to have a low 

pulse ox.  This is most likely secondary to sleep apnea.  The patient was easy 

to arouse but still has some hypoxia present on room air and was seen between 89

 to 92%.  The patient was placed on nasal cannula and given a DuoNeb treatment. 

 His chest x-ray shows some pulmonary vascular congestion.  For these reasons 

the patient will be admitted to the hospital for further evaluation and treatm

ent and was accepted for admission by the hospitalist service.  Nephrology was 

contacted and consulted.





- Differential Diagnosis


CHF, volume overload, pneumonia, sleep apnea


Critical Care Time: No


Critical care attestation.: 


If time is entered above; I have spent that time in minutes in the direct care 

of this critically ill patient, excluding procedure time.








ED Disposition


Clinical Impression: 


 ESRD needing dialysis, Hypercalcemia





Pulmonary edema


Qualifiers:


 Chronicity: acute Qualified Code(s): J81.0 - Acute pulmonary edema





Hypertension


Qualifiers:


 Hypertension type: essential hypertension Qualified Code(s): I10 - Essential 

(primary) hypertension





Disposition: DC-09 OP ADMIT IP TO THIS HOSP


Is pt being admited?: Yes


Condition: Fair


Time of Disposition: 07:29

## 2020-02-25 NOTE — HISTORY AND PHYSICAL REPORT
History of Present Illness


Date of examination: 02/25/20


Date of admission: 


02/25/20 07:30





Chief complaint: 


low back pain





History of present illness: 


Patient is 48 yo with ESRd on dialysis,presented with low back pain, found to 

have acute pulm edema, hypertensive urgency








Past History


Past Medical History: ESRD, hypertension





Medications and Allergies


                                    Allergies











Allergy/AdvReac Type Severity Reaction Status Date / Time


 


No Known Allergies Allergy   Verified 02/25/20 04:29











                                Home Medications











 Medication  Instructions  Recorded  Confirmed  Last Taken  Type


 


Cinacalcet [Sensipar] 30 mg PO QDAY 01/04/20 02/25/20 Unknown History


 


Furosemide [Lasix TAB] 80 mg PO QDAY 01/04/20 02/25/20 Unknown History


 


NIFEdipine [Nifedipine ER] 90 mg PO QDAY 01/04/20 02/25/20 Unknown History


 


Pantoprazole [Protonix TAB] 40 mg PO QDAY 01/04/20 02/25/20 Unknown History


 


carvediloL [Coreg] 25 mg PO QDAY 01/04/20 02/25/20 Unknown History


 


hydrALAZINE [Apresoline TAB] 100 mg PO TID 01/04/20 02/25/20 Unknown History











Active Meds: 


Active Medications





Epoetin Amadeo (Procrit)  10,000 unit IV QUITA PRN


   PRN Reason: hemodialysis


Hydralazine HCl (Apresoline)  20 mg IV Q4HR PRN


   PRN Reason: SBP>160  or  DBP>110


   Last Admin: 02/25/20 10:59 Dose:  20 mg


   Documented by: 


Sodium Chloride (Nacl 0.9%)  100 mls @ 999 mls/hr IV QUITA PRN


   PRN Reason: Hypotension











Exam





- Physical Exam


Narrative exam: 


GEN: Not in acute distress, sitting up in chair


HEENT: Normocephalic, atraumatic, 


Neck: supple, No JVD


Lungs: Clear to auscultation ,no wheeze,


heart;S1 and S2 reg, no murmurs, rubs or gallop


Abd:soft, non tender , non distended,  normal bowel sounds


Ext: No edema, no clubbing, no cyanosis


Neuro: Awake,alert, oriented X 3, no focal neurological signs











- Constitutional


Vitals: 


                                        











Temp Pulse Resp BP Pulse Ox


 


 97.7 F   86   20   209/120   97 


 


 02/25/20 10:04  02/25/20 10:04  02/25/20 10:04  02/25/20 10:04  02/25/20 10:04














Results





- Labs


CBC & Chem 7: 


                                 02/25/20 04:59





                                 02/25/20 04:59


Labs: 


                              Abnormal lab results











  02/25/20 02/25/20 02/25/20 Range/Units





  04:59 04:59 09:59 


 


RBC  2.88 L    (3.65-5.03)  M/mm3


 


Hgb  8.6 L    (11.8-15.2)  gm/dl


 


Hct  25.9 L    (35.5-45.6)  %


 


RDW  18.0 H    (13.2-15.2)  %


 


Lymph % (Auto)  11.5 L    (13.4-35.0)  %


 


Mono % (Auto)  9.6 H    (0.0-7.3)  %


 


Eos % (Auto)  6.4 H    (0.0-4.3)  %


 


Lymph #  1.0 L    (1.2-5.4)  K/mm3


 


Mono #  0.9 H    (0.0-0.8)  K/mm3


 


Eos #  0.6 H    (0.0-0.4)  K/mm3


 


Seg Neutrophils %  71.4 H    (40.0-70.0)  %


 


Chloride   95.4 L   ()  mmol/L


 


BUN   55 H   (9-20)  mg/dL


 


Creatinine   11.4 H   (0.8-1.5)  mg/dL


 


Glucose   102 H   ()  mg/dL


 


Calcium   12.2 H*   (8.4-10.2)  mg/dL


 


PTH Intact    69.20 H  (15-65)  pg/mL














Assessment and Plan


Hypertensive urgency


place on observation





Fluid overload/Acute pulm edema


For dialysis today





ESRD on dialysis





back pain

## 2020-02-26 VITALS — SYSTOLIC BLOOD PRESSURE: 159 MMHG | DIASTOLIC BLOOD PRESSURE: 83 MMHG

## 2020-02-26 RX ADMIN — HEPARIN SODIUM SCH UNIT: 5000 INJECTION, SOLUTION INTRAVENOUS; SUBCUTANEOUS at 09:58

## 2020-02-26 RX ADMIN — HYDRALAZINE HYDROCHLORIDE SCH MG: 100 TABLET, FILM COATED ORAL at 13:36

## 2020-02-26 RX ADMIN — NIFEDIPINE SCH MG: 90 TABLET, EXTENDED RELEASE ORAL at 09:57

## 2020-02-26 RX ADMIN — HYDRALAZINE HYDROCHLORIDE PRN MG: 20 INJECTION INTRAMUSCULAR; INTRAVENOUS at 06:43

## 2020-02-26 RX ADMIN — FUROSEMIDE SCH MG: 40 TABLET ORAL at 09:57

## 2020-02-26 RX ADMIN — Medication SCH ML: at 09:58

## 2020-02-26 RX ADMIN — HYDRALAZINE HYDROCHLORIDE SCH MG: 100 TABLET, FILM COATED ORAL at 09:58

## 2020-02-26 RX ADMIN — PANTOPRAZOLE SODIUM SCH MG: 40 TABLET, DELAYED RELEASE ORAL at 09:57

## 2020-02-26 RX ADMIN — HYDRALAZINE HYDROCHLORIDE PRN MG: 20 INJECTION INTRAMUSCULAR; INTRAVENOUS at 12:54

## 2020-02-26 NOTE — DISCHARGE SUMMARY
Providers





- Providers


Date of Admission: 


02/25/20 07:30





Date of discharge: 02/26/20


Attending physician: 


DEENA JIMENES





                                        





02/25/20 07:27


Consult to Physician [CONS] Routine 


   Comment: 


   Consulting Provider: ELISHA STEVE


   Physician Instructions: 


   Reason For Exam: dialysis











[facility] 


[facility]








Hospitalization


Condition: Fair


Disposition: DC-01 TO HOME OR SELFCARE





Core Measure Documentation





- Palliative Care


Palliative Care/ Comfort Measures: Not Applicable





- Core Measures


Any of the following diagnoses?: none





Exam





- Constitutional


Vitals: 


                                        











Temp Pulse Resp BP Pulse Ox


 


 99.1 F   91 H  18   152/86   95 


 


 02/26/20 12:06  02/26/20 13:35  02/26/20 12:06  02/26/20 14:18  02/26/20 12:06














Plan


Activity: advance as tolerated


Diet: low fat, low cholesterol, low salt, renal


Plan of Treatment: 


1.Follow up with PCP in 1 week.


2.Continue routine hemodialysis as scheduled


Follow up with: 


[facility], MD [Primary Care Provider] - 3-5 Days

## 2020-02-26 NOTE — PROGRESS NOTE
Assessment and Plan


Impression:


* End stage renal disease


* Back pain


* Anemia


* Hypercalcemia


* Atrial fibrillation on chronic anticoagulation


* Hypertension


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


* Secondary hyperparathyroidism


* Medical noncompliance





Plan:


* Patient is s/p hemodialysis yesterday 


* Continue HD TTS schedule - no acute need for HD today


* UF as tolerated


* Hypercalcemia work up ordered - CÉSAR ULLOA


* Adjust Ca bath with dialysis


* Continue antiHTN medications


* Epogen TIW prn


* Renal diet


* Dose medications for renal function











Subjective


Date of service: 02/26/20


Interval history: 





Patient reports that pain has resolved.  Wants to go home.  States that he has 

plans to go out of town tomorrow.





Objective





- Vital Signs


Vital signs: 


                               Vital Signs - 12hr











  02/26/20 02/26/20 02/26/20





  06:43 08:39 09:58


 


Temperature   


 


Pulse Rate   74


 


Respiratory   





Rate   


 


Blood Pressure 170/81  205/117


 


Blood Pressure   





[Right]   


 


O2 Sat by Pulse  99 





Oximetry   














  02/26/20 02/26/20 02/26/20





  12:06 12:54 13:35


 


Temperature 99.1 F  


 


Pulse Rate 82 82 91 H


 


Respiratory 18  





Rate   


 


Blood Pressure 198/103 198/103 


 


Blood Pressure   149/75





[Right]   


 


O2 Sat by Pulse 95  





Oximetry   














  02/26/20 02/26/20





  14:18 15:45


 


Temperature  


 


Pulse Rate  92 H


 


Respiratory  





Rate  


 


Blood Pressure 152/86 152/86


 


Blood Pressure  





[Right]  


 


O2 Sat by Pulse  





Oximetry  














- General Appearance


General appearance: well-developed, well-nourished


EENT: ATNC, mucous membranes moist


Respiratory: Present: Clear to Ascultation


Cardiology: regular, normal heart rate, S1S2


Gastrointestinal: normal, no tenderness, no distended


Integumentary: no rash, warm and dry


Neurologic: no focal deficit, alert and oriented x3


Musculoskeletal: other (no edema)


Psychiatric: cooperative





- Lab





                                 02/25/20 04:59





                                 02/25/20 04:59


                             Most recent lab results











Calcium  12.2 mg/dL (8.4-10.2)  H*  02/25/20  04:59    














Medications & Allergies





- Medications


Allergies/Adverse Reactions: 


                                    Allergies





No Known Allergies Allergy (Verified 02/25/20 04:29)


   








Home Medications: 


                                Home Medications











 Medication  Instructions  Recorded  Confirmed  Last Taken  Type


 


Cinacalcet [Sensipar] 30 mg PO QDAY 01/04/20 02/25/20 Unknown History


 


Furosemide [Lasix TAB] 80 mg PO QDAY 01/04/20 02/25/20 Unknown History


 


NIFEdipine [Nifedipine ER] 90 mg PO QDAY 01/04/20 02/25/20 Unknown History


 


Pantoprazole [Protonix TAB] 40 mg PO QDAY 01/04/20 02/25/20 Unknown History


 


carvediloL [Coreg] 25 mg PO QDAY 01/04/20 02/25/20 Unknown History


 


hydrALAZINE [Apresoline TAB] 100 mg PO TID 01/04/20 02/25/20 Unknown History


 


cloNIDine [Catapres] 0.1 mg PO BID #60 tablet 02/26/20  Unknown Rx











Active Medications: 














Generic Name Dose Route Start Last Admin





  Trade Name Freq  PRN Reason Stop Dose Admin


 


Acetaminophen  650 mg  02/25/20 16:42 





  Tylenol  PO  





  Q4H PRN  





  Pain MILD(1-3)/Fever >100.5/HA  


 


Albuterol  2.5 mg  02/25/20 16:42 





  Proventil  IH  





  Q4HRT PRN  





  Shortness Of Breath  


 


Carvedilol  25 mg  02/25/20 12:00  02/26/20 09:58





  Coreg  PO   25 mg





  QDAY ARRON   Administration


 


Epoetin Amadeo  10,000 unit  02/25/20 09:26 





  Procrit  IV  





  QUITA PRN  





  hemodialysis  


 


Furosemide  80 mg  02/25/20 12:00  02/26/20 09:57





  Lasix  PO   80 mg





  DAILY ARRON   Administration


 


Heparin Sodium (Porcine)  5,000 unit  02/25/20 22:00  02/26/20 09:58





  Heparin  SUB-Q   5,000 unit





  Q12HR ARRON   Administration


 


Hydralazine HCl  20 mg  02/25/20 10:40  02/26/20 12:54





  Apresoline  IV   20 mg





  Q4HR PRN   Administration





  SBP>160  or  DBP>110  


 


Hydralazine HCl  100 mg  02/25/20 14:00  02/26/20 13:36





  Apresoline  PO   100 mg





  TID ARRON   Administration


 


Sodium Chloride  100 mls @ 999 mls/hr  02/25/20 09:26 





  Nacl 0.9%  IV  





  QUITA PRN  





  Hypotension  


 


Morphine Sulfate  2 mg  02/25/20 16:42 





  Morphine  IV  





  Q4H PRN  





  Pain, Moderate (4-6)  


 


Nifedipine  90 mg  02/25/20 12:00  02/26/20 09:57





  Procardia Xl  PO   90 mg





  QDAY ARRON   Administration


 


Ondansetron HCl  4 mg  02/25/20 16:42 





  Zofran  IV  





  Q8H PRN  





  Nausea And Vomiting  


 


Pantoprazole Sodium  40 mg  02/25/20 12:00  02/26/20 09:57





  Protonix  PO   40 mg





  QDAY ARRON   Administration


 


Sodium Chloride  10 ml  02/25/20 22:00  02/26/20 09:58





  Sodium Chloride Flush Syringe 10 Ml  IV   10 ml





  BID ARRON   Administration


 


Sodium Chloride  10 ml  02/25/20 16:42 





  Sodium Chloride Flush Syringe 10 Ml  IV  





  PRN PRN  





  LINE FLUSH

## 2020-02-26 NOTE — XRAY REPORT
LUMBOSACRAL SPINE 3 VIEWS



INDICATION / CLINICAL INFORMATION:

Low back pain.



COMPARISON:

None available.

 

FINDINGS:



BONES / JOINT(S): No acute fracture or subluxation. No significant arthritis.

SOFT TISSUES: There are marked atherosclerotic calcifications involving the abdominal aorta with a ma
ximal AP measurement of approximately 3.2 cm.



ADDITIONAL FINDINGS: None.



IMPRESSION:

1. No significant osseous abnormality.

2. Marked atherosclerosis with ectasia/mild AAA of the infrarenal abdominal aorta.



Signer Name: Omkar Echevarria MD 

Signed: 2/26/2020 9:15 AM

 Workstation Name: [id number]

## 2020-03-01 LAB
ALBUMIN SERPL-MCNC: 3.6 G/DL (ref 3.8–4.8)
GAMMA GLOB SERPL ELPH-MCNC: 0.9 G/DL (ref 0.8–1.7)

## 2020-03-19 ENCOUNTER — HOSPITAL ENCOUNTER (OUTPATIENT)
Dept: HOSPITAL 5 - ED | Age: 50
Setting detail: OBSERVATION
LOS: 1 days | Discharge: HOME | End: 2020-03-20
Attending: INTERNAL MEDICINE | Admitting: INTERNAL MEDICINE
Payer: MEDICARE

## 2020-03-19 DIAGNOSIS — I12.0: Primary | ICD-10-CM

## 2020-03-19 DIAGNOSIS — E43: ICD-10-CM

## 2020-03-19 DIAGNOSIS — E87.2: ICD-10-CM

## 2020-03-19 DIAGNOSIS — I48.21: ICD-10-CM

## 2020-03-19 DIAGNOSIS — Z79.01: ICD-10-CM

## 2020-03-19 DIAGNOSIS — Z99.2: ICD-10-CM

## 2020-03-19 DIAGNOSIS — N17.9: ICD-10-CM

## 2020-03-19 DIAGNOSIS — Z79.899: ICD-10-CM

## 2020-03-19 DIAGNOSIS — Z91.19: ICD-10-CM

## 2020-03-19 DIAGNOSIS — N18.6: ICD-10-CM

## 2020-03-19 DIAGNOSIS — D63.1: ICD-10-CM

## 2020-03-19 DIAGNOSIS — E87.5: ICD-10-CM

## 2020-03-19 LAB
ALBUMIN SERPL-MCNC: 3.7 G/DL (ref 3.9–5)
ALT SERPL-CCNC: 30 UNITS/L (ref 7–56)
APTT BLD: 27.1 SEC. (ref 24.2–36.6)
BASOPHILS # (AUTO): 0.1 K/MM3 (ref 0–0.1)
BASOPHILS NFR BLD AUTO: 1 % (ref 0–1.8)
BILIRUB DIRECT SERPL-MCNC: < 0.2 MG/DL (ref 0–0.2)
BUN SERPL-MCNC: 113 MG/DL (ref 9–20)
BUN/CREAT SERPL: 7 %
CALCIUM SERPL-MCNC: 10.9 MG/DL (ref 8.4–10.2)
EOSINOPHIL # BLD AUTO: 0.2 K/MM3 (ref 0–0.4)
EOSINOPHIL NFR BLD AUTO: 3.9 % (ref 0–4.3)
HCT VFR BLD CALC: 23.3 % (ref 35.5–45.6)
HEMOLYSIS INDEX: 13
HGB BLD-MCNC: 7.4 GM/DL (ref 11.8–15.2)
INR PPP: 1.08 (ref 0.87–1.13)
LYMPHOCYTES # BLD AUTO: 0.8 K/MM3 (ref 1.2–5.4)
LYMPHOCYTES NFR BLD AUTO: 13.6 % (ref 13.4–35)
MCHC RBC AUTO-ENTMCNC: 32 % (ref 32–34)
MCV RBC AUTO: 93 FL (ref 84–94)
MONOCYTES # (AUTO): 0.5 K/MM3 (ref 0–0.8)
MONOCYTES % (AUTO): 8.3 % (ref 0–7.3)
PLATELET # BLD: 226 K/MM3 (ref 140–440)
RBC # BLD AUTO: 2.52 M/MM3 (ref 3.65–5.03)

## 2020-03-19 PROCEDURE — 87116 MYCOBACTERIA CULTURE: CPT

## 2020-03-19 PROCEDURE — 96374 THER/PROPH/DIAG INJ IV PUSH: CPT

## 2020-03-19 PROCEDURE — 80048 BASIC METABOLIC PNL TOTAL CA: CPT

## 2020-03-19 PROCEDURE — 96372 THER/PROPH/DIAG INJ SC/IM: CPT

## 2020-03-19 PROCEDURE — 93005 ELECTROCARDIOGRAM TRACING: CPT

## 2020-03-19 PROCEDURE — 93010 ELECTROCARDIOGRAM REPORT: CPT

## 2020-03-19 PROCEDURE — 80076 HEPATIC FUNCTION PANEL: CPT

## 2020-03-19 PROCEDURE — 71045 X-RAY EXAM CHEST 1 VIEW: CPT

## 2020-03-19 PROCEDURE — G0378 HOSPITAL OBSERVATION PER HR: HCPCS

## 2020-03-19 PROCEDURE — 36415 COLL VENOUS BLD VENIPUNCTURE: CPT

## 2020-03-19 PROCEDURE — 80053 COMPREHEN METABOLIC PANEL: CPT

## 2020-03-19 PROCEDURE — 85025 COMPLETE CBC W/AUTO DIFF WBC: CPT

## 2020-03-19 PROCEDURE — 85730 THROMBOPLASTIN TIME PARTIAL: CPT

## 2020-03-19 PROCEDURE — 99291 CRITICAL CARE FIRST HOUR: CPT

## 2020-03-19 PROCEDURE — 85610 PROTHROMBIN TIME: CPT

## 2020-03-19 PROCEDURE — G0257 UNSCHED DIALYSIS ESRD PT HOS: HCPCS

## 2020-03-19 PROCEDURE — 80074 ACUTE HEPATITIS PANEL: CPT

## 2020-03-19 RX ADMIN — HYDRALAZINE HYDROCHLORIDE SCH MG: 100 TABLET, FILM COATED ORAL at 23:06

## 2020-03-19 RX ADMIN — Medication SCH ML: at 23:08

## 2020-03-19 RX ADMIN — HYDRALAZINE HYDROCHLORIDE SCH: 100 TABLET, FILM COATED ORAL at 15:00

## 2020-03-19 NOTE — XRAY REPORT
CHEST 1 VIEW 



INDICATION / CLINICAL INFORMATION:

MAIN: Dyspnea; PT MISSED 2 DIALYSIS TREATMENT THIS WEEK AND IS NOW IN PAIN.



COMPARISON: 

2/25/2020



FINDINGS:



SUPPORT DEVICES: None.

HEART / MEDIASTINUM: Cardiomegaly 

LUNGS / PLEURA: Diffuse interstitial pulmonary edema No pneumothorax. 



ADDITIONAL FINDINGS: No significant additional findings.



IMPRESSION:

Cardiomegaly with diffuse interstitial pulmonary edema



Signer Name: Gunner Doshi MD FACR 

Signed: 3/19/2020 10:22 AM

Workstation Name: [email or web address]

## 2020-03-19 NOTE — EMERGENCY DEPARTMENT REPORT
ED Shortness of Breath HPI





- General


Chief Complaint: Dyspnea/Respdistress


Stated Complaint: BODYACHES,MISSED DIALYSIS


Time Seen by Provider: 03/19/20 09:17


Source: EMS


Mode of arrival: Ambulatory


Limitations: Other





- History of Present Illness


Initial Comments: 





Patient is 49 years old male with history of end-stage renal disease on 

hemodialysis and hypertension.  Patient presented to the ER complaining of 

shortness of breath for the last 5 days.  Patient stated that he missed 2 of his

dialysis session.  Patient denied any fever, chills, cough, chest pain, 

abdominal pain, nausea or vomiting.


MD Complaint: shortness of breath


-: days(s) (5)





- Related Data


                                Home Medications











 Medication  Instructions  Recorded  Confirmed  Last Taken


 


Cinacalcet [Sensipar] 30 mg PO QDAY 01/04/20 03/19/20 03/19/20 04:00


 


Furosemide [Lasix TAB] 80 mg PO QDAY 01/04/20 03/19/20 03/19/20 04:00


 


NIFEdipine [Nifedipine ER] 90 mg PO QDAY 01/04/20 03/19/20 03/19/20 04:00


 


Pantoprazole [Protonix TAB] 40 mg PO QDAY 01/04/20 03/19/20 03/19/20 04:00


 


carvediloL [Coreg] 25 mg PO QDAY 01/04/20 03/19/20 03/19/20 04:00


 


hydrALAZINE [Apresoline TAB] 100 mg PO TID 01/04/20 03/19/20 03/19/20 04:00








                                  Previous Rx's











 Medication  Instructions  Recorded  Last Taken  Type


 


cloNIDine [Catapres] 0.1 mg PO BID #60 tablet 02/26/20 03/19/20 04:00 Rx











                                    Allergies











Allergy/AdvReac Type Severity Reaction Status Date / Time


 


No Known Allergies Allergy   Verified 03/19/20 08:50














ED Review of Systems


ROS: 


Stated complaint: BODYACHES,MISSED DIALYSIS


Other details as noted in HPI





Comment: All other systems reviewed and negative


Constitutional: denies: chills, fever


Respiratory: orthopnea, shortness of breath, SOB with exertion, SOB at rest.  

denies: cough, wheezing


Cardiovascular: denies: chest pain, palpitations


Gastrointestinal: denies: abdominal pain, nausea, vomiting


Musculoskeletal: denies: back pain





ED Past Medical Hx





- Past Medical History


Hx Hypertension: Yes


Hx Heart Attack/AMI: No


Hx Renal Disease: Yes (HD TThS. Last HD)


Additional medical history: Glaucoma.  afib





- Surgical History


Additional Surgical History: Left A/V Graft





- Social History


Smoking Status: Never Smoker


Substance Use Type: None





- Medications


Home Medications: 


                                Home Medications











 Medication  Instructions  Recorded  Confirmed  Last Taken  Type


 


Cinacalcet [Sensipar] 30 mg PO QDAY 01/04/20 03/19/20 03/19/20 04:00 History


 


Furosemide [Lasix TAB] 80 mg PO QDAY 01/04/20 03/19/20 03/19/20 04:00 History


 


NIFEdipine [Nifedipine ER] 90 mg PO QDAY 01/04/20 03/19/20 03/19/20 04:00 

History


 


Pantoprazole [Protonix TAB] 40 mg PO QDAY 01/04/20 03/19/20 03/19/20 04:00 

History


 


carvediloL [Coreg] 25 mg PO QDAY 01/04/20 03/19/20 03/19/20 04:00 History


 


hydrALAZINE [Apresoline TAB] 100 mg PO TID 01/04/20 03/19/20 03/19/20 04:00 

History


 


cloNIDine [Catapres] 0.1 mg PO BID #60 tablet 02/26/20 03/19/20 03/19/20 04:00 

Rx














ED Physical Exam





- General


Limitations: No Limitations, Other


General appearance: alert, in no apparent distress





- Head


Head exam: Present: atraumatic





- Eye


Eye exam: Present: normal appearance, PERRL





- ENT


ENT exam: Present: normal exam, normal orophraynx, mucous membranes moist





- Neck


Neck exam: Present: normal inspection.  Absent: tenderness, meningismus





- Respiratory


Respiratory exam: Present: rales.  Absent: respiratory distress, wheezes, 

rhonchi, accessory muscle use, decreased breath sounds, prolonged expiratory





- Cardiovascular


Cardiovascular Exam: Present: regular rate, normal rhythm, normal heart sounds





- GI/Abdominal


GI/Abdominal exam: Present: soft, normal bowel sounds.  Absent: distended, 

tenderness, guarding, rebound, rigid, organomegaly, mass, bruit, pulsatile mass,

 hernia





- Extremities Exam


Extremities exam: Present: normal inspection, full ROM, normal capillary refill.

  Absent: pedal edema, calf tenderness





- Back Exam


Back exam: Present: normal inspection, full ROM.  Absent: CVA tenderness (R), 

CVA tenderness (L)





- Neurological Exam


Neurological exam: Present: alert, oriented X3, CN II-XII intact





- Psychiatric


Psychiatric exam: Present: normal mood





- Skin


Skin exam: Present: warm, intact, normal color





ED Course


                                   Vital Signs











  03/19/20 03/19/20 03/19/20





  08:38 08:50 10:03


 


Temperature  97.5 F L 


 


Pulse Rate  77 90


 


Respiratory  22 18





Rate   


 


Blood Pressure  140/85 


 


Blood Pressure   142/76





[Left]   


 


O2 Sat by Pulse 100 100 100





Oximetry   














  03/19/20 03/19/20





  11:32 12:14


 


Temperature  97.5 F L


 


Pulse Rate 80 92 H


 


Respiratory 16 20





Rate  


 


Blood Pressure  


 


Blood Pressure 151/74 145/76





[Left]  


 


O2 Sat by Pulse 95 94





Oximetry  














ED Medical Decision Making





- Lab Data


Result diagrams: 


                                 03/19/20 10:03





                                 03/19/20 10:03





- EKG Data


-: EKG Interpreted by Me


EKG shows normal: sinus rhythm


Rate: normal





- EKG Data


Interpretation: no acute changes





- Radiology Data


Radiology results: report reviewed





- Medical Decision Making





Patient is 49 years old male with history of end-stage renal disease on 

hemodialysis and hypertension.  Patient presented to the ER complaining of 

shortness of breath for the last 5 days.  Patient stated that he missed 2 of his

 dialysis session.  Patient denied any fever, chills, cough, chest pain, 

abdominal pain, nausea or vomiting.





Patient potassium is 5.7.  Patient given dextrose 50 and insulin 5.  Chest x-ray

 showed pulmonary edema.  I discussed the patient with Cindi, nurse 

practitioner with Dr. Stuart and she stated that she is putting dialysis 

order for the patient.





Patient discussed with Dr. Brown who agreed to admit the patient to medical 

service for further management.





My interaction with this patient is limited to less than 10 minutes at a time 

and was wearing my surgical mask and goggles.


Critical Care Time: Yes


Critical care time in (mins) excluding proc time.: 30


Critical care attestation.: 


If time is entered above; I have spent that time in minutes in the direct care 

of this critically ill patient, excluding procedure time.








ED Disposition


Clinical Impression: 


 Shortness of breath, Acute hyperkalemia, End-stage renal disease needing dialys

is





Disposition: DC-09 OP ADMIT IP TO THIS HOSP


Is pt being admited?: Yes


Condition: Stable

## 2020-03-19 NOTE — CONSULTATION
History of Present Illness





- Reason for Consult


Consult date: 03/19/20


end stage renal disease





- History of Present Illness





"I didn't go to dialysis and it caught up with me"





Mr. Barker is a 50yo male with ESRD on HD who presented to the ED with SOB.  He

reports that he missed two dialysis treatments.  He denies fever, chills, cough,

chest pain.





Past History


Past Medical History: atrial fib, ESRD, hypertension, other (See HPI)


Past Surgical History: Other (Left upper extremity AV fistula)


Social history: single.  denies: smoking, alcohol abuse, prescription drug abuse


Family history: hypertension





Medications and Allergies


                                    Allergies











Allergy/AdvReac Type Severity Reaction Status Date / Time


 


No Known Allergies Allergy   Verified 03/19/20 08:50











                                Home Medications











 Medication  Instructions  Recorded  Confirmed  Last Taken  Type


 


Cinacalcet [Sensipar] 30 mg PO QDAY 01/04/20 03/19/20 03/19/20 04:00 History


 


Furosemide [Lasix TAB] 80 mg PO QDAY 01/04/20 03/19/20 03/19/20 04:00 History


 


NIFEdipine [Nifedipine ER] 90 mg PO QDAY 01/04/20 03/19/20 03/19/20 04:00 

History


 


Pantoprazole [Protonix TAB] 40 mg PO QDAY 01/04/20 03/19/20 03/19/20 04:00 

History


 


carvediloL [Coreg] 25 mg PO QDAY 01/04/20 03/19/20 03/19/20 04:00 History


 


hydrALAZINE [Apresoline TAB] 100 mg PO TID 01/04/20 03/19/20 03/19/20 04:00 

History


 


cloNIDine [Catapres] 0.1 mg PO BID #60 tablet 02/26/20 03/19/20 03/19/20 04:00 

Rx











Active Meds: 


Active Medications





Acetaminophen (Tylenol)  650 mg PO Q4H PRN


   PRN Reason: Pain MILD(1-3)/Fever >100.5/HA


Albuterol (Proventil)  2.5 mg IH Q4HRT PRN


   PRN Reason: Shortness Of Breath


Carvedilol (Coreg)  25 mg PO QDAY ARRON


Cinacalcet (Sensipar)  30 mg PO QDAY ARRON


Clonidine HCl (Catapres)  0.1 mg PO BID ARRON


Furosemide (Lasix)  80 mg PO QDAY ARRON


Hydralazine HCl (Apresoline)  100 mg PO TID ARRON


Sodium Chloride (Nacl 0.9%)  100 mls @ 999 mls/hr IV QUITA PRN


   PRN Reason: Hypotension


Nifedipine (Procardia Xl)  90 mg PO QDAY ARRON


Ondansetron HCl (Zofran)  4 mg IV Q8H PRN


   PRN Reason: Nausea And Vomiting


Pantoprazole Sodium (Protonix)  40 mg PO QDAY ARRON


Sodium Chloride (Sodium Chloride Flush Syringe 10 Ml)  10 ml IV BID ARRON


Sodium Chloride (Sodium Chloride Flush Syringe 10 Ml)  10 ml IV PRN PRN


   PRN Reason: LINE FLUSH











Review of Systems


All systems: negative





Exam





- Vital Signs


Vital signs: 


                                   Vital Signs











Pulse Ox


 


 100 


 


 03/19/20 08:38














- General Appearance


General appearance: well-developed, well-nourished


EENT: ATNC


Respiratory: Decreased Breath Sounds


Heart: regular, S1S2


Gastrointestinal: Present: normal.  Absent: tenderness, distended


Integumentary: no rash, warm and dry


Neurologic: no focal deficit


Psychiatric: cooperative





Results





- Lab Results





                                 03/19/20 10:03





                                 03/19/20 10:03


                             Most recent lab results











Calcium  10.9 mg/dL (8.4-10.2)  H  03/19/20  10:03    














Assessment and Plan





Impression:


* End stage renal disease on HD


* Pulmonary edema


* Uremia


* Metabolic acidosis


* Atrial fibrillation on chronic anticoagulation


* Hypertension


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


* Medical noncompliance





Plan:


* Hemodialysis today. Continue TTS schedule


* UF as tolerated


* Epogen TIW prn


* Renal diet


* Dose medications for renal function

## 2020-03-19 NOTE — HISTORY AND PHYSICAL REPORT
History of Present Illness


Chief complaint: 





I cannot breathe and I need dialysis


History of present illness: 


50 YO Male with HTN, Atrial Fib on therapeutic anticoagulation, ESRD on hd 

(T,R,Sa), Severe Malnutrition presents to ED for evaluation.  Patient states 

that he has experienced progressive shortness of breath over the past 5 days 

with persistently worsening symptoms over the same timeframe.  Patient 

acknowledges noncompliance with his outpatient dialysis and that he has missed 

his past 2 dialysis sessions.  EMS notified and upon arrival the patient was 

found to be in respiratory distress and placed on supplemental oxygen and 

transported to [facility] for further care and evaluation.  Patient seen and evaluated 

in the emergency department.  Lab and imaging studies reviewed.  Patient found 

to have end-stage renal disease in need of urgent dialysis, fluid overload, 

metabolic acidosis, and accelerated hypertension.  Patient placed in observation

status and admitted to medical floor for further evaluation and care due to 

increased risk of decompensation.  Nephrology team consulted in ED.  Patient 

denies fever, chills, chest pain, palpitations, productive cough, unintentional 

weight loss, muscle aches, joint pain, skin rash, rhinorrhea, trauma, or known 

ill contacts.  Prior admission on 2/25/2020 reviewed.  All medication listed at 

time of admission has been reconciled.











Past History


Past Medical History: atrial fib, ESRD, hypertension, other (See HPI)


Past Surgical History: Other (Left upper extremity AV fistula)


Social history: single.  denies: smoking, alcohol abuse, prescription drug abuse


Family history: hypertension





Medications and Allergies


                                    Allergies











Allergy/AdvReac Type Severity Reaction Status Date / Time


 


No Known Allergies Allergy   Verified 03/19/20 08:50











                                Home Medications











 Medication  Instructions  Recorded  Confirmed  Last Taken  Type


 


Cinacalcet [Sensipar] 30 mg PO QDAY 01/04/20 03/19/20 03/19/20 04:00 History


 


Furosemide [Lasix TAB] 80 mg PO QDAY 01/04/20 03/19/20 03/19/20 04:00 History


 


NIFEdipine [Nifedipine ER] 90 mg PO QDAY 01/04/20 03/19/20 03/19/20 04:00 Histor

y


 


Pantoprazole [Protonix TAB] 40 mg PO QDAY 01/04/20 03/19/20 03/19/20 04:00 

History


 


carvediloL [Coreg] 25 mg PO QDAY 01/04/20 03/19/20 03/19/20 04:00 History


 


hydrALAZINE [Apresoline TAB] 100 mg PO TID 01/04/20 03/19/20 03/19/20 04:00 

History


 


cloNIDine [Catapres] 0.1 mg PO BID #60 tablet 02/26/20 03/19/20 03/19/20 04:00 

Rx














Review of Systems


Constitutional: weight gain, no weight loss, no fever, no chills, no sweats


Ears, nose, mouth and throat: no ear pain, no ear discharge, no tinnitis, no 

nose pain, no nasal congestion


Cardiovascular: no chest pain, no orthopnea, no palpitations, no rapid/irregular

 heart beat, no syncope


Respiratory: shortness of breath, no cough, no cough with sputum, no excessive 

sputum, no hemoptysis


Gastrointestinal: no nausea, no vomiting, no diarrhea, no constipation


Genitourinary Male: no hematuria, no flank pain, no discharge, no urinary 

hesitancy, no nocturia


Rectal: no pain, no incontinence, no bleeding


Musculoskeletal: no neck stiffness, no neck pain, no shooting arm pain, no arm 

numbness/tingling, no low back pain, no shooting leg pain


Integumentary: no rash, no pruritis, no wounds, no jaundice, no boils


Neurological: no head injury, no paralysis, no weakness, no parathesias, no 

numbness, no tremors


Psychiatric: no anxiety, no memory loss, no change in sleep habits, no sleep 

disturbances, no insomnia, no change in libido, no suicidal ideation


Endocrine: no cold intolerance, no excessive thirst, no polyuria, no nocturia


Hematologic/Lymphatic: no easy bruising, no lymphadenopathy


Allergic/Immunologic: no urticaria, no allergic rhinitis, no wheezing, no 

anaphylaxis, no angioedema





Exam





- Constitutional


Vitals: 


                                        











Temp Pulse Resp BP Pulse Ox


 


 97.5 F L  80   16   151/74   95 


 


 03/19/20 08:50  03/19/20 11:32  03/19/20 11:32  03/19/20 11:32  03/19/20 11:32











General appearance: Present: mild distress, cachectic





- EENT


Eyes: Present: PERRL


ENT: hearing intact, clear oral mucosa





- Neck


Neck: Present: supple, normal ROM





- Respiratory


Respiratory effort: normal


Respiratory: bilateral: diminished, rhonchi





- Cardiovascular


Heart Sounds: Present: S1 & S2.  Absent: rub, click





- Extremities


Extremities: pulses symmetrical, No edema


Peripheral Pulses: within normal limits





- Abdominal


General gastrointestinal: Present: soft, non-tender, non-distended, normal bowel

 sounds


Male genitourinary: Present: normal





- Integumentary


Integumentary: Present: clear, warm, dry





- Musculoskeletal


Musculoskeletal: gait normal, strength equal bilaterally





- Psychiatric


Psychiatric: appropriate mood/affect, intact judgment & insight





- Neurologic


Neurologic: CNII-XII intact, moves all extremities





Results





- Labs


CBC & Chem 7: 


                                 03/19/20 10:03





                                 03/19/20 10:03


Labs: 


                              Abnormal lab results











  03/19/20 03/19/20 Range/Units





  10:03 10:03 


 


RBC  2.52 L   (3.65-5.03)  M/mm3


 


Hgb  7.4 L   (11.8-15.2)  gm/dl


 


Hct  23.3 L   (35.5-45.6)  %


 


RDW  19.3 H   (13.2-15.2)  %


 


Mono % (Auto)  8.3 H   (0.0-7.3)  %


 


Lymph #  0.8 L   (1.2-5.4)  K/mm3


 


Seg Neutrophils %  73.2 H   (40.0-70.0)  %


 


Sodium   133 L  (137-145)  mmol/L


 


Potassium   5.7 H  (3.6-5.0)  mmol/L


 


Chloride   93.7 L  ()  mmol/L


 


Carbon Dioxide   18 L  (22-30)  mmol/L


 


BUN   113 H  (9-20)  mg/dL


 


Creatinine   16.5 H  (0.8-1.5)  mg/dL


 


Glucose   115 H  ()  mg/dL


 


Calcium   10.9 H  (8.4-10.2)  mg/dL


 


Alkaline Phosphatase   134 H  ()  units/L


 


Albumin   3.7 L  (3.9-5)  g/dL














Assessment and Plan





- Patient Problems


(1) End stage renal disease


Current Visit: Yes   Status: Acute   


Plan to address problem: 


Nephrology team consult placed in ED, dialysis as per renal team, strict I/O, 

monitor urine output every shift, daily weight, avoid nephrotoxic agents.








(2) Metabolic acidosis


Current Visit: Yes   Status: Acute   


Plan to address problem: 


Dialysis as per renal team, BMP, repeat BMP in a.m., supportive care.








(3) Atrial fibrillation


Current Visit: Yes   Status: Acute   


Qualifiers: 


   Atrial fibrillation type: permanent   Qualified Code(s): I48.21 - Permanent 

atrial fibrillation   


Plan to address problem: 


Continue therapeutic anticoagulation, continue rate control with beta-blocker 

therapy, supportive care.








(4) Accelerated hypertension


Current Visit: Yes   Status: Acute   


Plan to address problem: 


Monitor blood pressure every shift, continue prehospital medication, continue 

medical management.








(5) Severe malnutrition


Current Visit: Yes   Status: Acute   


Plan to address problem: 


Encourage increased protein intake, dietary supplementation.








(6) DVT prophylaxis


Current Visit: Yes   Status: Acute   


Plan to address problem: 


SCD to bilateral lower extremities while in bed, patient is ambulatory.

## 2020-03-20 VITALS — SYSTOLIC BLOOD PRESSURE: 140 MMHG | DIASTOLIC BLOOD PRESSURE: 84 MMHG

## 2020-03-20 LAB
ALBUMIN SERPL-MCNC: 3.6 G/DL (ref 3.9–5)
ALT SERPL-CCNC: 24 UNITS/L (ref 7–56)
BASOPHILS # (AUTO): 0 K/MM3 (ref 0–0.1)
BASOPHILS NFR BLD AUTO: 1 % (ref 0–1.8)
BUN SERPL-MCNC: 45 MG/DL (ref 9–20)
BUN/CREAT SERPL: 5 %
CALCIUM SERPL-MCNC: 9 MG/DL (ref 8.4–10.2)
EOSINOPHIL # BLD AUTO: 0.2 K/MM3 (ref 0–0.4)
EOSINOPHIL NFR BLD AUTO: 5.9 % (ref 0–4.3)
HCT VFR BLD CALC: 22.4 % (ref 35.5–45.6)
HEMOLYSIS INDEX: 10
HGB BLD-MCNC: 7.3 GM/DL (ref 11.8–15.2)
LYMPHOCYTES # BLD AUTO: 0.7 K/MM3 (ref 1.2–5.4)
LYMPHOCYTES NFR BLD AUTO: 16.5 % (ref 13.4–35)
MCHC RBC AUTO-ENTMCNC: 33 % (ref 32–34)
MCV RBC AUTO: 91 FL (ref 84–94)
MONOCYTES # (AUTO): 0.5 K/MM3 (ref 0–0.8)
MONOCYTES % (AUTO): 11.4 % (ref 0–7.3)
PLATELET # BLD: 230 K/MM3 (ref 140–440)
RBC # BLD AUTO: 2.47 M/MM3 (ref 3.65–5.03)

## 2020-03-20 RX ADMIN — HYDRALAZINE HYDROCHLORIDE SCH MG: 100 TABLET, FILM COATED ORAL at 10:16

## 2020-03-20 RX ADMIN — Medication SCH ML: at 10:17

## 2020-03-20 NOTE — PROGRESS NOTE
Assessment and Plan





Impression:


* End stage renal disease on HD


* Pulmonary edema


* Uremia


* Metabolic acidosis


* Atrial fibrillation on chronic anticoagulation


* Hypertension


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


* Medical noncompliance





Plan:


* Continue hemodialysis TTS schedule and plan for hd today


* UF as tolerated


* Epogen TIW prn


* amend bp meds


* Renal diet


* Dose medications for renal function


* bp is too elevated to dc home





Subjective


Date of service: 03/20/20


Principal diagnosis: esrd


Interval history: 





resting in bed








Objective





- Exam


Narrative Exam: 





General appearance: well-developed, well-nourished


EENT: ATNC


Respiratory: Decreased Breath Sounds


Heart: regular, S1S2


Gastrointestinal: Present: normal.  Absent: tenderness, distended


Integumentary: no rash, warm and dry


Neurologic: no focal deficit


Psychiatric: cooperative














- Vital Signs


Vital signs: 


                               Vital Signs - 12hr











  03/20/20 03/20/20 03/20/20





  00:57 05:25 10:16


 


Temperature  98.2 F 


 


Pulse Rate  99 H 90


 


Respiratory  20 





Rate   


 


Blood Pressure  179/102 216/115


 


O2 Sat by Pulse 97 99 





Oximetry   














  03/20/20





  10:17


 


Temperature 


 


Pulse Rate 90


 


Respiratory 





Rate 


 


Blood Pressure 216/115


 


O2 Sat by Pulse 





Oximetry 














- Lab





                                 03/20/20 05:15





                                 03/20/20 05:15


                             Most recent lab results











Calcium  9.0 mg/dL (8.4-10.2)  D 03/20/20  05:15    














Medications & Allergies





- Medications


Allergies/Adverse Reactions: 


                                    Allergies





No Known Allergies Allergy (Verified 03/19/20 08:50)


   








Home Medications: 


                                Home Medications











 Medication  Instructions  Recorded  Confirmed  Last Taken  Type


 


Cinacalcet [Sensipar] 30 mg PO QDAY #30 03/20/20  Unknown Rx


 


Epoetin Amadeo 20,000 Unit [Procrit] 20,000 unit SUB-Q Sa  vial 03/20/20  Unknown 

Rx


 


Epoetin Amadeo 20,000 Unit [Procrit] 20,000 unit SUB-Q Th  vial 03/20/20  Unknown 

Rx


 


Epoetin Amadeo 20,000 Unit [Procrit] 20,000 unit SUB-Q Tu  vial 03/20/20  Unknown 

Rx


 


Furosemide [Lasix TAB] 80 mg PO QDAY  tablet 03/20/20  Unknown Rx


 


Furosemide [Lasix TAB] 80 mg PO QDAY #30 03/20/20  Unknown Rx


 


NIFEdipine [Nifedipine ER] 90 mg PO QDAY #30 03/20/20  Unknown Rx


 


Pantoprazole [Protonix TAB] 40 mg PO QDAY #30 03/20/20  Unknown Rx


 


carvediloL [Coreg] 25 mg PO QDAY #30 03/20/20  Unknown Rx


 


cloNIDine [Catapres] 0.1 mg PO BID #60 tablet 03/20/20  Unknown Rx


 


hydrALAZINE [Apresoline TAB] 100 mg PO TID #90 tab 03/20/20  Unknown Rx











Active Medications: 














Generic Name Dose Route Start Last Admin





  Trade Name Freq  PRN Reason Stop Dose Admin


 


Acetaminophen  650 mg  03/19/20 11:51  03/19/20 16:45





  Tylenol  PO   650 mg





  Q4H PRN   Administration





  Pain MILD(1-3)/Fever >100.5/HA  


 


Albuterol  2.5 mg  03/19/20 11:51 





  Proventil  IH  





  Q4HRT PRN  





  Shortness Of Breath  


 


Carvedilol  25 mg  03/20/20 10:00  03/20/20 10:17





  Coreg  PO   25 mg





  QDAY ARRON   Administration


 


Cinacalcet  30 mg  03/20/20 10:00  03/20/20 10:16





  Sensipar  PO   30 mg





  QDAY ARRON   Administration


 


Clonidine HCl  0.1 mg  03/19/20 22:00  03/20/20 10:16





  Catapres  PO   0.1 mg





  BID ARRON   Administration


 


Epoetin Amadeo  20,000 unit  03/21/20 10:00 





  Procrit  SUB-Q  





  Sa ARRON  


 


Epoetin Amadeo  20,000 unit  03/24/20 10:00 





  Procrit  SUB-Q  





  Tu ARRON  


 


Epoetin Amadeo  20,000 unit  03/19/20 22:00  03/20/20 05:43





  Procrit  SUB-Q   20,000 unit





  Th ARRON   Administration


 


Furosemide  80 mg  03/20/20 10:00  03/20/20 10:16





  Lasix  PO   80 mg





  QDAY ARRON   Administration


 


Hydralazine HCl  100 mg  03/19/20 14:00  03/20/20 10:16





  Apresoline  PO   100 mg





  TID ARRON   Administration


 


Sodium Chloride  100 mls @ 999 mls/hr  03/19/20 13:41 





  Nacl 0.9%  IV  





  QUITA PRN  





  Hypotension  


 


Nifedipine  90 mg  03/20/20 10:00  03/20/20 10:16





  Procardia Xl  PO   90 mg





  QDAY ARRON   Administration


 


Ondansetron HCl  4 mg  03/19/20 11:51 





  Zofran  IV  





  Q8H PRN  





  Nausea And Vomiting  


 


Pantoprazole Sodium  40 mg  03/20/20 10:00  03/20/20 10:23





  Protonix  PO   40 mg





  QDAY ARRON   Administration


 


Sodium Chloride  10 ml  03/19/20 22:00  03/20/20 10:17





  Sodium Chloride Flush Syringe 10 Ml  IV   10 ml





  BID ARRON   Administration


 


Sodium Chloride  10 ml  03/19/20 11:51 





  Sodium Chloride Flush Syringe 10 Ml  IV  





  PRN PRN  





  LINE FLUSH

## 2020-03-20 NOTE — DISCHARGE SUMMARY
Providers





- Providers


Date of Admission: 


03/19/20 11:51





Date of discharge: 03/20/20


Attending physician: 


PAVAN CASEY





                                        





03/19/20 11:40


Consult to Physician [CONS] Stat 


   Comment: 


   Consulting Provider: ELISHA STEVE


   Physician Instructions: 


   Reason For Exam: End-stage renal disease needing dialysis











Primary care physician: 


PRIMARY CARE MD








Hospitalization


Reason for admission: Missed hemodialysis


Condition: Stable


Hospital course: 





49-year-old male with ESRD on hemodialysis, hypertension, atrial fibrillation on

 therapeutic anticoagulation and severe protein calorie malnutrition who 

presented through the emergency department with complaints of dyspnea.  Patient 

reported that he had missed 2 hemodialysis sessions prior to admission.  Patient

 denied cough, chest pain or fever.  Chest x-ray revealed evidence of pulmonary 

edema/volume overload.  Patient was also noted on laboratory to have uremia and 

metabolic acidosis.  Patient was admitted with diagnosis of acute pulmonary 

edema/volume overload, metabolic acidosis and anemia of chronic kidney disease. 

 The patient was seen by nephrology and underwent urgent hemodialysis.  Patient 

has significant improvement in his symptoms and return back to his baseline.  

Therefore, patient is felt to receive maximal hospital benefit and will be 

discharged home.  Dedicated discharge time 32 minutes.


Disposition: DC-01 TO HOME OR SELFCARE


Time spent for discharge: 32





- Discharge Diagnoses


(1) Accelerated hypertension


Status: Acute   





(2) Atrial fibrillation


Status: Acute   


Qualifiers: 


   Atrial fibrillation type: permanent   Qualified Code(s): I48.21 - Permanent 

atrial fibrillation   





(3) End-stage renal disease needing dialysis


Status: Acute   





(4) Metabolic acidosis


Status: Acute   





(5) Acute on chronic renal failure


Status: Acute   





Core Measure Documentation





- Palliative Care


Palliative Care/ Comfort Measures: Not Applicable





- Core Measures


Any of the following diagnoses?: none





Exam





- Constitutional


Vitals: 


                                        











Temp Pulse Resp BP Pulse Ox


 


 98.2 F   90   20   216/115   99 


 


 03/20/20 05:25  03/20/20 10:17  03/20/20 05:25  03/20/20 10:17  03/20/20 05:25











General appearance: Present: no acute distress, well-nourished





- EENT


Eyes: Present: PERRL


ENT: hearing intact, clear oral mucosa





- Neck


Neck: Present: supple, normal ROM





- Respiratory


Respiratory effort: normal


Respiratory: bilateral: CTA





- Cardiovascular


Heart Sounds: Present: S1 & S2.  Absent: rub, click





- Extremities


Extremities: pulses symmetrical, No edema


Peripheral Pulses: within normal limits





- Abdominal


General gastrointestinal: Present: soft, non-tender, non-distended, normal bowel

 sounds


Male genitourinary: Present: normal





- Integumentary


Integumentary: Present: clear, warm, dry





- Musculoskeletal


Musculoskeletal: gait normal, strength equal bilaterally





- Psychiatric


Psychiatric: appropriate mood/affect, intact judgment & insight





- Neurologic


Neurologic: CNII-XII intact, moves all extremities





Plan


Activity: advance as tolerated


Weight Bearing Status: Weight Bear as Tolerated


Diet: renal


Follow up with: 


PRIMARY CARE,MD [Primary Care Provider] - 3-5 Days


ELISHA STEVE MD [Staff Physician] - 7 Days


Prescriptions: 


hydrALAZINE [Apresoline TAB] 100 mg PO TID #90 tab


cloNIDine [Catapres] 0.1 mg PO BID #60 tablet


carvediloL [Coreg] 25 mg PO QDAY #30


Furosemide [Lasix TAB] 80 mg PO QDAY #30


NIFEdipine [Nifedipine ER] 90 mg PO QDAY #30


Pantoprazole [Protonix TAB] 40 mg PO QDAY #30


Cinacalcet [Sensipar] 30 mg PO QDAY #30

## 2021-08-11 ENCOUNTER — HOSPITAL ENCOUNTER (OUTPATIENT)
Dept: HOSPITAL 5 - ED | Age: 51
Setting detail: OBSERVATION
LOS: 1 days | Discharge: HOME | End: 2021-08-12
Attending: INTERNAL MEDICINE | Admitting: HOSPITALIST
Payer: MEDICARE

## 2021-08-11 DIAGNOSIS — D63.1: ICD-10-CM

## 2021-08-11 DIAGNOSIS — I12.0: ICD-10-CM

## 2021-08-11 DIAGNOSIS — Z79.82: ICD-10-CM

## 2021-08-11 DIAGNOSIS — Z79.899: ICD-10-CM

## 2021-08-11 DIAGNOSIS — Z98.890: ICD-10-CM

## 2021-08-11 DIAGNOSIS — I16.0: Primary | ICD-10-CM

## 2021-08-11 DIAGNOSIS — N25.81: ICD-10-CM

## 2021-08-11 DIAGNOSIS — I48.91: ICD-10-CM

## 2021-08-11 DIAGNOSIS — N18.6: ICD-10-CM

## 2021-08-11 DIAGNOSIS — Z99.2: ICD-10-CM

## 2021-08-11 LAB
BUN SERPL-MCNC: 76 MG/DL (ref 9–20)
BUN/CREAT SERPL: 5 %
CALCIUM SERPL-MCNC: 8.3 MG/DL (ref 8.4–10.2)
HCT VFR BLD CALC: 22.8 % (ref 35.5–45.6)
HEMOLYSIS INDEX: 1
HGB BLD-MCNC: 7.5 GM/DL (ref 11.8–15.2)
MCHC RBC AUTO-ENTMCNC: 33 % (ref 32–34)
MCV RBC AUTO: 92 FL (ref 84–94)
PLATELET # BLD: 180 K/MM3 (ref 140–440)
RBC # BLD AUTO: 2.47 M/MM3 (ref 3.65–5.03)

## 2021-08-11 PROCEDURE — 96374 THER/PROPH/DIAG INJ IV PUSH: CPT

## 2021-08-11 PROCEDURE — 80074 ACUTE HEPATITIS PANEL: CPT

## 2021-08-11 PROCEDURE — G0378 HOSPITAL OBSERVATION PER HR: HCPCS

## 2021-08-11 PROCEDURE — 80048 BASIC METABOLIC PNL TOTAL CA: CPT

## 2021-08-11 PROCEDURE — 83880 ASSAY OF NATRIURETIC PEPTIDE: CPT

## 2021-08-11 PROCEDURE — 85027 COMPLETE CBC AUTOMATED: CPT

## 2021-08-11 PROCEDURE — G0257 UNSCHED DIALYSIS ESRD PT HOS: HCPCS

## 2021-08-11 PROCEDURE — 36415 COLL VENOUS BLD VENIPUNCTURE: CPT

## 2021-08-11 PROCEDURE — 99291 CRITICAL CARE FIRST HOUR: CPT

## 2021-08-11 NOTE — EVENT NOTE
ED Screening Note


ED Screening Note: 





states he needs dialysis


states he just moved from [place] 


states he last had dialysis on Thursday 8/5/21


reports he begins at his new dialysis clinic on friday 8/13/21








This initial assessment/diagnostic orders/clinical plan/treatment(s) is/are 

subject to change based on patients health status, clinical progression and re-

assessment by fellow clinical providers in the ED. Further treatment and workup 

at subsequent clinical providers discretion. Patient/guardian urged not to elope

from the ED as their condition may be serious if not clinically assessed and 

managed. 





Initial orders include: 


needs MAIN ED for dialysis

## 2021-08-12 VITALS — SYSTOLIC BLOOD PRESSURE: 182 MMHG | DIASTOLIC BLOOD PRESSURE: 92 MMHG

## 2021-08-12 NOTE — EVENT NOTE
Date: 08/12/21





Patient is known to our group and has been accepted to [facility] for HD 

under the care of Dr. Naranjo; he previously was following with [facility] prior to 

moving to [place].  He has a chair at [facility] but is just awaiting his COVID test, 

and as his last HD was several days ago, came to ED for HD in interim period.  





Will place HD orders with official consult to follow.

## 2021-08-12 NOTE — EMERGENCY DEPARTMENT REPORT
ED General Adult HPI





- General


Chief complaint: Medical Clearance


Stated complaint: DIALYSIS


PUI?: No


Time Seen by Provider: 08/12/21 03:33


Source: patient


Mode of arrival: Ambulatory


Limitations: No Limitations





- History of Present Illness


Initial comments: 





Patient is a 31-year-old male who presents emergency room for withdrawals.  

Patient states he just moved back here 4 days ago.  Patient states his last 

dialysis was 1 week ago.  Patient states she has not established care here.  

Patient states he doesn't have a neurologist in the area.  Patient states he 

does not have a dialysis center.  Patient denies shortness of breath.  Patient 

denies chest pain.  Patient denies visual complaints.  Patient states she just 

needs to have dialysis and then he can go home.








Patient denies recent travel.  Patient denies recent international travel.  

Patient denies exposure to the novel coronavirus.  Patient denies sick contacts.

 Patient denies fever and chills.  Patient denies cough.  Patient denies 

diarrhea.  Patient denies coming in contact with anybody with symptoms of the 

novel coronavirus.








-: Sudden


Consistency: constant


Improves with: none


Worsens with: none


Associated Symptoms: denies other symptoms





- Related Data


                                  Previous Rx's











 Medication  Instructions  Recorded  Last Taken  Type


 


Cinacalcet [Sensipar] 30 mg PO QDAY #30 03/20/20 Unknown Rx


 


Epoetin Amadeo 20,000 Unit [Procrit] 20,000 unit SUB-Q Sa  vial 03/20/20 Unknown 

Rx


 


Epoetin Amadeo 20,000 Unit [Procrit] 20,000 unit SUB-Q Th  vial 03/20/20 Unknown 

Rx


 


Epoetin Amadeo 20,000 Unit [Procrit] 20,000 unit SUB-Q Tu  vial 03/20/20 Unknown 

Rx


 


Furosemide [Lasix TAB] 80 mg PO QDAY  tablet 03/20/20 Unknown Rx


 


Furosemide [Lasix TAB] 80 mg PO QDAY #30 03/20/20 Unknown Rx


 


NIFEdipine [Nifedipine ER] 90 mg PO QDAY #30 03/20/20 Unknown Rx


 


Pantoprazole [Protonix TAB] 40 mg PO QDAY #30 03/20/20 Unknown Rx


 


carvediloL [Coreg] 25 mg PO QDAY #30 03/20/20 Unknown Rx


 


cloNIDine [Catapres] 0.1 mg PO BID #60 tablet 03/20/20 Unknown Rx


 


hydrALAZINE [Apresoline TAB] 100 mg PO TID #90 tab 03/20/20 Unknown Rx











                                    Allergies











Allergy/AdvReac Type Severity Reaction Status Date / Time


 


No Known Allergies Allergy   Verified 03/19/20 08:50














ED Review of Systems


ROS: 


Stated complaint: DIALYSIS


Other details as noted in HPI





Constitutional: denies: chills, fever


Eyes: denies: eye pain, eye discharge, vision change


ENT: denies: ear pain, throat pain


Respiratory: denies: cough, shortness of breath, wheezing


Cardiovascular: denies: chest pain, palpitations


Endocrine: no symptoms reported


Gastrointestinal: denies: abdominal pain, nausea, diarrhea


Genitourinary: denies: urgency, dysuria


Musculoskeletal: denies: back pain, joint swelling, arthralgia


Skin: denies: rash, lesions


Neurological: denies: headache, weakness, paresthesias


Psychiatric: denies: anxiety, depression


Hematological/Lymphatic: denies: easy bleeding, easy bruising





ED Past Medical Hx





- Past Medical History


Previous Medical History?: Yes


Hx Hypertension: Yes


Hx Heart Attack/AMI: No


Hx Renal Disease: Yes (HD TThS. Last HD)


Additional medical history: Glaucoma.  afib





- Surgical History


Past Surgical History?: Yes


Additional Surgical History: Left A/V Graft





- Family History


Family history: no significant





- Social History


Smoking Status: Never Smoker


Substance Use Type: None





- Medications


Home Medications: 


                                Home Medications











 Medication  Instructions  Recorded  Confirmed  Last Taken  Type


 


Cinacalcet [Sensipar] 30 mg PO QDAY #30 03/20/20  Unknown Rx


 


Epoetin Amadeo 20,000 Unit [Procrit] 20,000 unit SUB-Q Sa  vial 03/20/20  Unknown 

Rx


 


Epoetin Amadeo 20,000 Unit [Procrit] 20,000 unit SUB-Q Th  vial 03/20/20  Unknown 

Rx


 


Epoetin Amadeo 20,000 Unit [Procrit] 20,000 unit SUB-Q Tu  vial 03/20/20  Unknown 

Rx


 


Furosemide [Lasix TAB] 80 mg PO QDAY  tablet 03/20/20  Unknown Rx


 


Furosemide [Lasix TAB] 80 mg PO QDAY #30 03/20/20  Unknown Rx


 


NIFEdipine [Nifedipine ER] 90 mg PO QDAY #30 03/20/20  Unknown Rx


 


Pantoprazole [Protonix TAB] 40 mg PO QDAY #30 03/20/20  Unknown Rx


 


carvediloL [Coreg] 25 mg PO QDAY #30 03/20/20  Unknown Rx


 


cloNIDine [Catapres] 0.1 mg PO BID #60 tablet 03/20/20  Unknown Rx


 


hydrALAZINE [Apresoline TAB] 100 mg PO TID #90 tab 03/20/20  Unknown Rx














ED Physical Exam





- General


Limitations: No Limitations


General appearance: alert, in no apparent distress





- Head


Head exam: Present: atraumatic, normocephalic





- Eye


Eye exam: Present: normal appearance





- ENT


ENT exam: Present: mucous membranes moist





- Neck


Neck exam: Present: normal inspection





- Respiratory


Respiratory exam: Present: normal lung sounds bilaterally.  Absent: respiratory 

distress





- Cardiovascular


Cardiovascular Exam: Present: regular rate, normal rhythm.  Absent: systolic 

murmur, diastolic murmur, rubs, gallop





- GI/Abdominal


GI/Abdominal exam: Present: soft, normal bowel sounds





- Rectal


Rectal exam: Present: deferred





- Extremities Exam


Extremities exam: Present: normal inspection





- Back Exam


Back exam: Present: normal inspection





- Neurological Exam


Neurological exam: Present: alert, oriented X3





- Psychiatric


Psychiatric exam: Present: normal affect, normal mood





- Skin


Skin exam: Present: warm, dry, intact, normal color.  Absent: rash





ED Course


                                   Vital Signs











  08/11/21





  15:19


 


Temperature 98.3 F


 


Pulse Rate 81


 


Respiratory 16





Rate 


 


Blood Pressure 194/102





[Left] 


 


O2 Sat by Pulse 96





Oximetry 














- Reevaluation(s)


Reevaluation #1: 


I discussed all results with patient.  I discussed plan of care with patient.  

Patient agrees with plan of care and admission.  Patient to be admitted to the 

hospitalist service.


08/12/21 03:55








- Consultations


Consultation #1: 


Nephrology consulted.


08/12/21 03:48





Consultation #2: 


Hospitalist consulted for admission.  Hospitalist to admit patient.


08/12/21 03:58








ED Medical Decision Making





- Lab Data


Result diagrams: 


                                 08/11/21 16:10





                                 08/11/21 16:10





- Medical Decision Making





Patient is a 51-year-old male who presents emergency room with complaints of 

needing dialysis.  Patient has dialysis for 7 days.  Patient will require in-

hospital dialysis.  Nephrology consulted.  Patient had labs done.  Consistent 

with end-stage renal disease.  Patient potassium normal.  Patient admitted to 

the hospital service for further evaluation treatment.





Critical care time documented due to the multiple reassessments, prolonged time 

at the bedside, interpretation of diagnostics and labs.











- Differential Diagnosis


End-stage renal disease, missed dialysis, volume overload.


Critical Care Time: Yes


Critical care time in (mins) excluding proc time.: 35


Critical care attestation.: 


If time is entered above; I have spent that time in minutes in the direct care 

of this critically ill patient, excluding procedure time.





Critical Care Time: 





35 minutes











ED Disposition


Clinical Impression: 


 End-stage renal disease needing dialysis, Missed dialysis





Fluid overload


Qualifiers:


 Hypervolemia type: unspecified Qualified Code(s): E87.70 - Fluid overload, 

unspecified





Disposition: DC-09 OP ADMIT IP TO THIS HOSP


Is pt being admited?: Yes


Does the pt Need Aspirin: No


Condition: Critical


Time of Disposition: 04:10

## 2021-08-12 NOTE — HISTORY AND PHYSICAL REPORT
History of Present Illness


Date of examination: 08/12/21


Date of admission: 


08/12/21 03:51





Chief complaint: 


Noncompliance with hemodialysis, requesting HD





History of present illness: 


51-year-old male patient, with history of end-stage renal disease on 

hemodialysis, hypertension, chronic atrial fibrillation presented to the ER with

history of noncompliance with dialysis, last HD was 1 week ago, he recently 

moved to this area, did not establish HD and nephrologist.


Patient is denies any chest pain shortness of breath, came for hemodialysis and 

be discharged


Patient has history of atrial fibrillation however not on chronic 

anticoagulation


Initial work-up is consistent with accelerated hypertension, mild shortness of 

breath


Patient did not receive COVID-19 vaccination


Reports that he tested for COVID-19 outside, pending report


Patient blood pressures are uncontrolled with systolic more than 170 and 

diastolic more than 115








Past History


Past Medical History: atrial fib (Chronic), dialysis, ESRD, hypertension


Past Surgical History: Other (AV fistula)


Social history: denies: smoking, alcohol abuse, prescription drug abuse


Family history: no significant family history





Medications and Allergies


                                    Allergies











Allergy/AdvReac Type Severity Reaction Status Date / Time


 


No Known Allergies Allergy   Verified 03/19/20 08:50











                                Home Medications











 Medication  Instructions  Recorded  Confirmed  Last Taken  Type


 


Cinacalcet [Sensipar] 30 mg PO QDAY #30 03/20/20  Unknown Rx


 


Epoetin Amadeo 20,000 Unit [Procrit] 20,000 unit SUB-Q Sa  vial 03/20/20  Unknown 

Rx


 


Epoetin Amadeo 20,000 Unit [Procrit] 20,000 unit SUB-Q Th  vial 03/20/20  Unknown 

Rx


 


Epoetin Amadeo 20,000 Unit [Procrit] 20,000 unit SUB-Q Tu  vial 03/20/20  Unknown 

Rx


 


Furosemide [Lasix TAB] 80 mg PO QDAY  tablet 03/20/20  Unknown Rx


 


Furosemide [Lasix TAB] 80 mg PO QDAY #30 03/20/20  Unknown Rx


 


NIFEdipine [Nifedipine ER] 90 mg PO QDAY #30 03/20/20  Unknown Rx


 


Pantoprazole [Protonix TAB] 40 mg PO QDAY #30 03/20/20  Unknown Rx


 


carvediloL [Coreg] 25 mg PO QDAY #30 03/20/20  Unknown Rx


 


cloNIDine [Catapres] 0.1 mg PO BID #60 tablet 03/20/20  Unknown Rx


 


hydrALAZINE [Apresoline TAB] 100 mg PO TID #90 tab 03/20/20  Unknown Rx














Review of Systems


Constitutional: fatigue, weakness, no weight loss, no weight gain, no fever, no 

chills


Ears, nose, mouth and throat: no nasal congestion, no nasal discharge


Cardiovascular: shortness of breath, no chest pain, no orthopnea


Respiratory: no cough, no hemoptysis


Gastrointestinal: no abdominal pain, no nausea, no vomiting


Genitourinary Male: no dysuria, no hematuria, no flank pain


Musculoskeletal: no myalgias, no arthritis


Integumentary: no rash, no lesions


Neurological: weakness, no seizures, no syncope


Psychiatric: no anxiety, no depression


Endocrine: no cold intolerance, no heat intolerance, no polydipsia, no polyuria


Hematologic/Lymphatic: no easy bruising, no easy bleeding


Allergic/Immunologic: no urticaria, no allergic rhinitis





Exam





- Constitutional


Vitals: 


                                        











Temp Pulse Resp BP Pulse Ox


 


 97.8 F   68   15   178/94   99 


 


 08/12/21 04:10  08/12/21 08:01  08/12/21 08:01  08/12/21 08:01  08/12/21 08:01











General appearance: Present: mild distress, well-nourished





- EENT


Eyes: Present: PERRL, EOM intact





- Neck


Neck: Present: supple, normal ROM





- Respiratory


Respiratory effort: normal


Respiratory: bilateral: diminished, rales, negative: rhonchi, wheezing





- Cardiovascular


Rhythm: regular


Heart Sounds: Present: S1 & S2





- Extremities


Extremities: no ischemia, No edema





Results





- Labs


CBC & Chem 7: 


                                 08/11/21 16:10





                                 08/11/21 16:10


Labs: 


                              Abnormal lab results











  08/11/21 08/11/21 Range/Units





  16:10 16:10 


 


WBC  4.1 L   (4.5-11.0)  K/mm3


 


RBC  2.47 L   (3.65-5.03)  M/mm3


 


Hgb  7.5 L   (11.8-15.2)  gm/dl


 


Hct  22.8 L   (35.5-45.6)  %


 


RDW  21.0 H   (13.2-15.2)  %


 


Chloride   96.0 L  ()  mmol/L


 


BUN   76 H  (9-20)  mg/dL


 


Creatinine   14.0 H  (0.8-1.3)  mg/dL


 


Glucose   113 H  ()  mg/dL


 


Calcium   8.3 L  (8.4-10.2)  mg/dL


 


NT-Pro-B Natriuret Pep   > 3500 H  (0-900)  pg/mL














Assessment and Plan


-- Accelerated hypertension


Status: Acute   


Resume home antihypertensives, as needed hydralazine


Closely monitor





--h/o chronic atrial fibrillation/rate controlled


Status: Chronic


Resume home beta-blockers, not on chronic anticoagulation


Cardiology consult if needed





--End-stage renal disease needing dialysis


Status: Acute 


Nephrology consulted, HD per schedule


Supportive care





--Anemia of chronic disease


Status: Acute  


Monitor H&H and transfuse as needed


Procrit during hemodialysis





--DVT prophylaxis


Heparin renal dose





We will closely monitor the patient and adjust management as needed


Follow consultant recommendations

## 2021-08-12 NOTE — DISCHARGE SUMMARY
Providers





- Providers


Date of Admission: 


08/12/21 03:51





Date of discharge: 08/12/21


Attending physician: 


AMY J KOCHERLA





                                        





08/12/21 03:48


Consult to Physician [CONS] Routine 


   Comment: 


   Consulting Provider: PADMINI NARANJO


   Physician Instructions: 


   Reason For Exam: HD- patient known to Dr. Naranjo











Primary care physician: 


PRIMARY CARE MD








Hospitalization


Reason for admission: Noncompliance with hemodialysis, shortness of breath/for 

HD


Condition: Stable


Procedures: 


Hemodialysis


Hospital course: 


51-year-old male patient, with history of end-stage renal disease on 

hemodialysis, hypertension, chronic atrial fibrillation presented to the ER with

 history of noncompliance with dialysis, last HD was 1 week ago, he recently 

moved to this area, did not establish HD and nephrologist.


Patient is denies any chest pain shortness of breath, came for hemodialysis and 

be discharged


Patient has history of atrial fibrillation however not on chronic 

anticoagulation


Initial work-up is consistent with accelerated hypertension, mild shortness of 

breath


Patient did not receive COVID-19 vaccination


Reports that he tested for COVID-19 outside, pending report


Patient blood pressures are uncontrolled on admission


However improved after dialysis


Patient was evaluated by nephrology, underwent hemodialysis


And nephrology cleared for discharge as patient has outpatient dialysis 

scheduled


And patient can collect his Covid 19 report and presented to the dialysis center


Patient is hemodynamically and clinically stable at discharge








Discharge diagnosis;


-- Accelerated hypertension


Status: Acute   


Improved, continue current antihypertensives





--h/o paroxysmal atrial fibrillation/patient is in sinus rhythm


Status: Chronic


Reviewed many previous EKGs, sinus rhythm


Patient on beta-blockers, not on anticoagulation





--End-stage renal disease needing dialysis


Status: Acute 


HD per schedule





--Anemia of chronic disease


Status: Acute  


Procrit during dialysis, transfuse as needed





--DVT prophylaxis


Heparin renal dose





Cleared by nephrology for discharge


Stable at discharge











Disposition: 01 HOME / SELF CARE / HOMELESS


Final Discharge Diagnosis (Prints w/discharge instructions): Accelerated 

hypertension/improved.  Chronic atrial fibrillation/rate controlled.  Not on 

anticoagulation.  End-stage renal disease.  Requiring hemodialysis.  Anemia of 

chronic disease


Time spent for discharge: 35 min





Core Measure Documentation





- Palliative Care


Palliative Care/ Comfort Measures: Not Applicable





- Core Measures


Any of the following diagnoses?: none





Exam





- Constitutional


Vitals: 


                                        











Temp Pulse Resp BP Pulse Ox


 


 97.8 F   68   13   176/89   97 


 


 08/12/21 04:10  08/12/21 12:31  08/12/21 12:31  08/12/21 12:31  08/12/21 12:31











General appearance: Present: no acute distress, well-nourished





- EENT


Eyes: Present: PERRL, EOM intact





- Neck


Neck: Present: supple, normal ROM





- Respiratory


Respiratory effort: normal


Respiratory: bilateral: diminished, rales, negative: rhonchi, wheezing





- Cardiovascular


Rhythm: regular


Heart Sounds: Present: S1 & S2





- Extremities


Extremities: no ischemia, No edema





- Abdominal


General gastrointestinal: Present: soft, non-tender, non-distended, normal bowel

 sounds





- Integumentary


Integumentary: Present: clear, warm





- Musculoskeletal


Musculoskeletal: strength equal bilaterally, generalized weakness





- Psychiatric


Psychiatric: appropriate mood/affect, cooperative





- Neurologic


Neurologic: moves all extremities





Plan


Activity: advance as tolerated


Diet: renal


Additional Instructions: Follow renal/hemodialysis per schedule.  If you have 

worsening symptoms,contact MD or go to the nearest emergency room,.  Advised to 

comply with medications, diet, follow-up visits, hemodialysis


Follow up with: 


PRIMARY CARE,MD [Primary Care Provider] - 7 Days


TAWANNA BRUNNER MD [Staff Physician] - 7 Days

## 2021-08-12 NOTE — CONSULTATION
History of Present Illness





- Reason for Consult


Consult date: 08/12/21


end stage renal disease





- History of Present Illness





This is a 51 year-old man with ESRD who presents for need for HD.  





Patient usually dialyzes Tu/Th/Sa, just moved back from [place] to [place], was to start 

at [facility] but awaiting COVID testing.  Last HD one week ago.  Denies 

any recent issues with HD, including dizziness, lightheadedness, cramping, chest

pain on HD.  





Currently, patient denies any issues including dyspnea, edema, access issues, 

nausea, vomiting, headaches. Does have residual renal function 





Past History


Past Medical History: atrial fib (Chronic), dialysis, ESRD, hypertension


Past Surgical History: Other (AV fistula)


Social history: denies: smoking, alcohol abuse, prescription drug abuse


Family history: no significant family history





Medications and Allergies


                                    Allergies











Allergy/AdvReac Type Severity Reaction Status Date / Time


 


No Known Allergies Allergy   Verified 03/19/20 08:50











                                Home Medications











 Medication  Instructions  Recorded  Confirmed  Last Taken  Type


 


Cinacalcet [Sensipar] 30 mg PO QDAY #30 03/20/20  Unknown Rx


 


Epoetin Amadeo 20,000 Unit [Procrit] 20,000 unit SUB-Q Sa  vial 03/20/20  Unknown 

Rx


 


Epoetin Amadeo 20,000 Unit [Procrit] 20,000 unit SUB-Q Th  vial 03/20/20  Unknown 

Rx


 


Epoetin Amadeo 20,000 Unit [Procrit] 20,000 unit SUB-Q Tu  vial 03/20/20  Unknown 

Rx


 


Furosemide [Lasix TAB] 80 mg PO QDAY  tablet 03/20/20  Unknown Rx


 


Furosemide [Lasix TAB] 80 mg PO QDAY #30 03/20/20  Unknown Rx


 


NIFEdipine [Nifedipine ER] 90 mg PO QDAY #30 03/20/20  Unknown Rx


 


Pantoprazole [Protonix TAB] 40 mg PO QDAY #30 03/20/20  Unknown Rx


 


carvediloL [Coreg] 25 mg PO QDAY #30 03/20/20  Unknown Rx


 


cloNIDine [Catapres] 0.1 mg PO BID #60 tablet 03/20/20  Unknown Rx


 


hydrALAZINE [Apresoline TAB] 100 mg PO TID #90 tab 03/20/20  Unknown Rx











Active Meds: 


Active Medications





Aspirin (Aspirin Ec 325 Mg Tab)  325 mg PO QDAY [facility]


   Last Admin: 08/12/21 10:55 Dose:  325 mg


   Documented by: 


Carvedilol (Carvedilol 25 Mg Tab)  25 mg PO QDAY [facility]


   Last Admin: 08/12/21 10:54 Dose:  25 mg


   Documented by: 


Cinacalcet (Cinacalcet 30 Mg Tab)  30 mg PO QDAY [facility]


   Last Admin: 08/12/21 11:30 Dose:  30 mg


   Documented by: 


Clonidine HCl (Clonidine 0.1 Mg Tab)  0.1 mg PO BID [facility]


   Last Admin: 08/12/21 10:54 Dose:  0.1 mg


   Documented by: 


Furosemide (Furosemide 40 Mg Tab)  80 mg PO QDAY [facility]


   Last Admin: 08/12/21 11:30 Dose:  80 mg


   Documented by: 


Hydralazine HCl (Hydralazine 100 Mg Tab)  100 mg PO TID [facility]


Hydralazine HCl (Hydralazine 20 Mg/1 Ml Inj)  10 mg IV Q4HR PRN


   PRN Reason: Hypertension


   Last Admin: 08/12/21 10:56 Dose:  10 mg


   Documented by: 


Sodium Chloride (Nacl 0.9%)  100 mls @ 999 mls/hr IV QUITA PRN


   PRN Reason: Hypotension


Pantoprazole Sodium (Pantoprazole 40 Mg Tab)  40 mg PO QDAY [facility]


   Last Admin: 08/12/21 10:55 Dose:  40 mg


   Documented by: 











Exam





- Vital Signs


Vital signs: 


                                   Vital Signs











Temp Pulse Resp BP Pulse Ox


 


 98.3 F   81   16   194/102   96 


 


 08/11/21 15:19  08/11/21 15:19  08/11/21 15:19  08/11/21 15:19  08/11/21 15:19














- Physical Exam


Narrative exam: 





Constitutional: no acute distress


Head: NC/AT


Neck: supple


Lungs: clear to auscultation


CV: RRR, no M/R/G


Abdomen: soft, non-tender, bowel sounds present


Back: nontender


Extremities: no edema, pulses WNL, AVF intact


Skin: intact


Neuro: no focal deficits, alert and oriented x4





Results





- Lab Results





                                 08/11/21 16:10





                                 08/11/21 16:10


                             Most recent lab results











Calcium  8.3 mg/dL (8.4-10.2)  L  08/11/21  16:10    














Assessment and Plan





This is a 51 year old man who presents with need for HD





# ESRD: HD today for solute/toxin clearance and volume removal


- daily labs


- renally dose meds


- avoid nephrotoxins


- renal diet


- verbal consent obtained for HD


- can be discharged post-HD today if stable and plan to start as outpatient





# Anemia: last hemoglobin 7.5, ESAs with HD prn  





# HTN: UF as tolerated.  BP high





# Secondary Hyperparathyroidism: continue home binders as needed

## 2021-08-15 ENCOUNTER — HOSPITAL ENCOUNTER (EMERGENCY)
Dept: HOSPITAL 5 - ED | Age: 51
Discharge: LEFT BEFORE DISCHARGE | End: 2021-08-15
Payer: MEDICARE

## 2021-08-15 VITALS — SYSTOLIC BLOOD PRESSURE: 194 MMHG | DIASTOLIC BLOOD PRESSURE: 109 MMHG

## 2021-08-15 DIAGNOSIS — Z53.21: ICD-10-CM

## 2021-08-15 DIAGNOSIS — R10.9: Primary | ICD-10-CM

## 2021-08-15 PROCEDURE — 85025 COMPLETE CBC W/AUTO DIFF WBC: CPT

## 2021-08-15 PROCEDURE — 83690 ASSAY OF LIPASE: CPT

## 2021-08-15 PROCEDURE — 36415 COLL VENOUS BLD VENIPUNCTURE: CPT

## 2021-08-15 PROCEDURE — 80053 COMPREHEN METABOLIC PANEL: CPT

## 2021-08-18 ENCOUNTER — HOSPITAL ENCOUNTER (OUTPATIENT)
Dept: HOSPITAL 5 - ED | Age: 51
Setting detail: OBSERVATION
LOS: 1 days | Discharge: HOME | End: 2021-08-19
Attending: INTERNAL MEDICINE | Admitting: INTERNAL MEDICINE
Payer: MEDICARE

## 2021-08-18 DIAGNOSIS — D63.1: ICD-10-CM

## 2021-08-18 DIAGNOSIS — Z99.2: ICD-10-CM

## 2021-08-18 DIAGNOSIS — I48.0: ICD-10-CM

## 2021-08-18 DIAGNOSIS — N18.6: ICD-10-CM

## 2021-08-18 DIAGNOSIS — I12.0: Primary | ICD-10-CM

## 2021-08-18 DIAGNOSIS — K21.9: ICD-10-CM

## 2021-08-18 LAB
ALBUMIN SERPL-MCNC: 3.8 G/DL (ref 3.9–5)
ALT SERPL-CCNC: 15 UNITS/L (ref 7–56)
BASOPHILS # (AUTO): 0.1 K/MM3 (ref 0–0.1)
BASOPHILS NFR BLD AUTO: 1.1 % (ref 0–1.8)
BUN SERPL-MCNC: 40 MG/DL (ref 9–20)
BUN/CREAT SERPL: 5 %
CALCIUM SERPL-MCNC: 7.9 MG/DL (ref 8.4–10.2)
EOSINOPHIL # BLD AUTO: 0.4 K/MM3 (ref 0–0.4)
EOSINOPHIL NFR BLD AUTO: 5.5 % (ref 0–4.3)
HCT VFR BLD CALC: 19.2 % (ref 35.5–45.6)
HEMOLYSIS INDEX: 0
HGB BLD-MCNC: 6.3 GM/DL (ref 11.8–15.2)
LYMPHOCYTES # BLD AUTO: 0.4 K/MM3 (ref 1.2–5.4)
LYMPHOCYTES NFR BLD AUTO: 5.4 % (ref 13.4–35)
MCHC RBC AUTO-ENTMCNC: 33 % (ref 32–34)
MCV RBC AUTO: 94 FL (ref 84–94)
MONOCYTES # (AUTO): 0.6 K/MM3 (ref 0–0.8)
MONOCYTES % (AUTO): 8 % (ref 0–7.3)
PLATELET # BLD: 220 K/MM3 (ref 140–440)
RBC # BLD AUTO: 2.05 M/MM3 (ref 3.65–5.03)

## 2021-08-18 PROCEDURE — G0378 HOSPITAL OBSERVATION PER HR: HCPCS

## 2021-08-18 PROCEDURE — 85025 COMPLETE CBC W/AUTO DIFF WBC: CPT

## 2021-08-18 PROCEDURE — 86920 COMPATIBILITY TEST SPIN: CPT

## 2021-08-18 PROCEDURE — 96376 TX/PRO/DX INJ SAME DRUG ADON: CPT

## 2021-08-18 PROCEDURE — 86901 BLOOD TYPING SEROLOGIC RH(D): CPT

## 2021-08-18 PROCEDURE — 96374 THER/PROPH/DIAG INJ IV PUSH: CPT

## 2021-08-18 PROCEDURE — G0257 UNSCHED DIALYSIS ESRD PT HOS: HCPCS

## 2021-08-18 PROCEDURE — 36430 TRANSFUSION BLD/BLD COMPNT: CPT

## 2021-08-18 PROCEDURE — 86900 BLOOD TYPING SEROLOGIC ABO: CPT

## 2021-08-18 PROCEDURE — 36415 COLL VENOUS BLD VENIPUNCTURE: CPT

## 2021-08-18 PROCEDURE — P9016 RBC LEUKOCYTES REDUCED: HCPCS

## 2021-08-18 PROCEDURE — 86850 RBC ANTIBODY SCREEN: CPT

## 2021-08-18 PROCEDURE — 99284 EMERGENCY DEPT VISIT MOD MDM: CPT

## 2021-08-18 PROCEDURE — 85007 BL SMEAR W/DIFF WBC COUNT: CPT

## 2021-08-18 PROCEDURE — 80053 COMPREHEN METABOLIC PANEL: CPT

## 2021-08-18 PROCEDURE — 80048 BASIC METABOLIC PNL TOTAL CA: CPT

## 2021-08-18 RX ADMIN — Medication SCH ML: at 21:54

## 2021-08-18 RX ADMIN — OXYCODONE AND ACETAMINOPHEN PRN TAB: 5; 325 TABLET ORAL at 21:02

## 2021-08-18 NOTE — HISTORY AND PHYSICAL REPORT
History of Present Illness


Chief complaint: 





I need dialysis


History of present illness: 


52 YO Male with ESRD on HD(M,W,F), GERD, HTN, Paroxysmal Atrial Fib not on 

therapeutic anticoagulation presents to ED for evaluation.  Patient reports I 

was unable to get dialysis".  Patient states that he was in his usual state of 

health and presented to his routine outpatient dialysis center today.  Patient 

was found to have a hemoglobin of 6.8, unable to undergo dialysis.  Patient was 

instructed to seek further care at [facility].  Patient 

transported to [facility] via private vehicle for further care and evaluation of the 

aforementioned symptoms.  The patient was seen and evaluated in the emergency 

department.  All lab and imaging studies reviewed.  Patient found to have end-

stage renal disease in need of urgent dialysis as well as symptomatic anemia.  

Patient placed in observation status admitted to medical floor.  Patient treated

with packed red blood cell transfusion for symptomatic anemia.  Nephrology team 

consulted in ED for dialysis as per renal team.  Patient denies fever, chills, 

chest pain, palpitation, productive cough, skin rash, recent contact, or known 

exposure to COVID-19.  Prior admission on 8/12/2021 reviewed.  All medication 

listed at time of admission has been reconciled.





Past History


Past Medical History: atrial fib, ESRD, GERD, hypertension


Past Surgical History: Other (Dialysis access)


Social history: single.  denies: smoking, alcohol abuse, prescription drug abuse


Family history: hypertension





Medications and Allergies


                                    Allergies











Allergy/AdvReac Type Severity Reaction Status Date / Time


 


No Known Allergies Allergy   Verified 08/18/21 10:37











                                Home Medications











 Medication  Instructions  Recorded  Confirmed  Last Taken  Type


 


Cinacalcet [Sensipar] 30 mg PO QDAY #30 03/20/20  Unknown Rx


 


Epoetin Amadeo 20,000 Unit [Procrit] 20,000 unit SUB-Q Sa  vial 03/20/20  Unknown 

Rx


 


Epoetin Amadeo 20,000 Unit [Procrit] 20,000 unit SUB-Q Th  vial 03/20/20  Unknown 

Rx


 


Epoetin Amadeo 20,000 Unit [Procrit] 20,000 unit SUB-Q Tu  vial 03/20/20  Unknown 

Rx


 


Furosemide [Lasix TAB] 80 mg PO QDAY #30 03/20/20  Unknown Rx


 


NIFEdipine [Nifedipine ER] 90 mg PO QDAY #30 03/20/20  Unknown Rx


 


Pantoprazole [Protonix TAB] 40 mg PO QDAY #30 03/20/20  Unknown Rx


 


carvediloL [Coreg] 25 mg PO QDAY #30 03/20/20  Unknown Rx


 


cloNIDine [Catapres] 0.1 mg PO BID #60 tablet 03/20/20  Unknown Rx


 


hydrALAZINE [Apresoline TAB] 100 mg PO TID #90 tab 03/20/20  Unknown Rx














Review of Systems


Constitutional: weakness, other (My blood is low), no weight gain, no fever


Ears, nose, mouth and throat: no ear pain, no ear discharge, no decreased 

hearing, no nose pain, no nasal congestion


Cardiovascular: no chest pain, no palpitations, no edema, no syncope


Respiratory: no cough, no cough with sputum, no hemoptysis, no dyspnea on 

exertion


Gastrointestinal: no abdominal pain, no diarrhea, no change in bowel habits


Genitourinary Male: no hematuria, no flank pain, no urinary frequency, no 

urinary hesitancy, no nocturia


Rectal: no pain, no incontinence, no bleeding


Musculoskeletal: no neck stiffness, no neck pain, no shooting arm pain, no low 

back pain, no shooting leg pain


Integumentary: no rash, no pruritis, no sores, no wounds, no jaundice, no boils


Neurological: no head injury, no transient paralysis, no weakness, no tingling, 

no seizures, no tremors, no lack of coordination


Psychiatric: no memory loss, no sleep disturbances, no insomnia, no hypersomnia,

no change in appetite, no suicidal ideation


Endocrine: no cold intolerance, no excessive thirst, no polyuria, no excessive 

sweating


Hematologic/Lymphatic: no easy bruising, no easy bleeding


Allergic/Immunologic: no urticaria, no wheezing





Exam





- Constitutional


Vitals: 


                                        











Temp Pulse Resp BP Pulse Ox


 


 98.2 F   89   20   213/87   96 


 


 08/18/21 16:26  08/18/21 16:26  08/18/21 16:26  08/18/21 16:26  08/18/21 16:26











General appearance: Present: mild distress





- EENT


Eyes: Present: PERRL


ENT: hearing intact, clear oral mucosa





- Neck


Neck: Present: supple, normal ROM





- Respiratory


Respiratory effort: normal


Respiratory: bilateral: CTA





- Cardiovascular


Heart Sounds: Present: S1 & S2.  Absent: rub, click





- Extremities


Extremities: pulses symmetrical, No edema


Peripheral Pulses: within normal limits





- Abdominal


General gastrointestinal: Present: soft, non-tender, non-distended, normal bowel

sounds


Male genitourinary: Present: normal





- Integumentary


Integumentary: Present: clear, warm, dry





- Musculoskeletal


Musculoskeletal: gait normal, strength equal bilaterally





- Psychiatric


Psychiatric: appropriate mood/affect, intact judgment & insight





- Neurologic


Neurologic: CNII-XII intact, moves all extremities





Results





- Labs


CBC & Chem 7: 


                                 08/18/21 11:06





                                 08/18/21 11:06


Labs: 


                              Abnormal lab results











  08/18/21 08/18/21 Range/Units





  11:06 11:06 


 


RBC  2.05 L   (3.65-5.03)  M/mm3


 


Hgb  6.3 L   (11.8-15.2)  gm/dl


 


Hct  19.2 L*   (35.5-45.6)  %


 


RDW  20.6 H   (13.2-15.2)  %


 


Lymph % (Auto)  5.4 L   (13.4-35.0)  %


 


Mono % (Auto)  8.0 H   (0.0-7.3)  %


 


Eos % (Auto)  5.5 H   (0.0-4.3)  %


 


Lymph # (Auto)  0.4 L   (1.2-5.4)  K/mm3


 


Seg Neutrophils %  80.0 H   (40.0-70.0)  %


 


BUN   40 H  (9-20)  mg/dL


 


Creatinine   8.7 H  (0.8-1.3)  mg/dL


 


Calcium   7.9 L  (8.4-10.2)  mg/dL


 


Alkaline Phosphatase   182 H  ()  units/L


 


Albumin   3.8 L  (3.9-5)  g/dL














Assessment and Plan





- Patient Problems


(1) ESRD (end stage renal disease) on dialysis


Current Visit: Yes   Status: Acute   


Plan to address problem: 


Nephrology team consulted in ED, dialysis as per renal team, avoid nephrotoxic 

agents.








(2) Anemia requiring transfusions


Current Visit: Yes   Status: Acute   


Plan to address problem: 


Primary blood cell transfusion, repeat CBC in a.m.








(3) DVT prophylaxis


Current Visit: Yes   Status: Acute   


Plan to address problem: 


SCD to bilateral lower extremities while in bed, patient is ambulatory

## 2021-08-18 NOTE — EMERGENCY DEPARTMENT REPORT
ED General Adult HPI





- General


Chief complaint: Recheck/Abnormal Lab/Rx


Stated complaint: SENT BY DEMETRIO HEMOGLOBIN 6.8


Time Seen by Provider: 08/18/21 12:44


Source: patient


Mode of arrival: Ambulatory


Limitations: Other





- History of Present Illness


Initial comments: 





Patient presents to the emergency department for evaluation of anemia.  Patient 

has a history of end-stage renal disease and is on hemodialysis.  His days of 

dialysis on Monday, Wednesday, Friday.  Patient states his last dialysis was on 

Monday.  Patient went to dialysis this morning and after checking his blood 

counts he was told that his hemoglobin was 6.8 and he should come to the [facility] for a blood transfusion.  Patient states he does make a small amount of 

urine but it is inconsistent.  Patient denies chest pain, shortness breath, or 

headache.


-: unknown


Severity scale (0 -10): 0


Consistency: constant


Improves with: none


Worsens with: none


Associated Symptoms: denies other symptoms


Treatments Prior to Arrival: none





- Related Data


                                  Previous Rx's











 Medication  Instructions  Recorded  Last Taken  Type


 


Cinacalcet [Sensipar] 30 mg PO QDAY #30 03/20/20 Unknown Rx


 


Epoetin Amadeo 20,000 Unit [Procrit] 20,000 unit SUB-Q Sa  vial 03/20/20 Unknown 

Rx


 


Epoetin Amadeo 20,000 Unit [Procrit] 20,000 unit SUB-Q Th  vial 03/20/20 Unknown 

Rx


 


Epoetin Amadeo 20,000 Unit [Procrit] 20,000 unit SUB-Q Tu  vial 03/20/20 Unknown 

Rx


 


Furosemide [Lasix TAB] 80 mg PO QDAY #30 03/20/20 Unknown Rx


 


NIFEdipine [Nifedipine ER] 90 mg PO QDAY #30 03/20/20 Unknown Rx


 


Pantoprazole [Protonix TAB] 40 mg PO QDAY #30 03/20/20 Unknown Rx


 


carvediloL [Coreg] 25 mg PO QDAY #30 03/20/20 Unknown Rx


 


cloNIDine [Catapres] 0.1 mg PO BID #60 tablet 03/20/20 Unknown Rx


 


hydrALAZINE [Apresoline TAB] 100 mg PO TID #90 tab 03/20/20 Unknown Rx











                                    Allergies











Allergy/AdvReac Type Severity Reaction Status Date / Time


 


No Known Allergies Allergy   Verified 08/18/21 10:37














ED Review of Systems


ROS: 


Stated complaint: SENT BY [facility] HEMOGLOBIN 6.8


Other details as noted in HPI





Comment: All other systems reviewed and negative


Constitutional: denies: chills, fever


Eyes: denies: eye pain, eye discharge, vision change


ENT: denies: ear pain, throat pain


Respiratory: denies: cough, shortness of breath, wheezing


Cardiovascular: denies: chest pain, palpitations


Endocrine: no symptoms reported


Gastrointestinal: denies: abdominal pain, nausea, diarrhea


Genitourinary: denies: urgency, dysuria


Musculoskeletal: denies: back pain, joint swelling, arthralgia


Skin: denies: rash, lesions


Neurological: denies: headache, weakness, paresthesias


Psychiatric: denies: anxiety, depression


Hematological/Lymphatic: denies: easy bleeding, easy bruising





ED Past Medical Hx





- Past Medical History


Hx Hypertension: Yes


Hx Heart Attack/AMI: No


Hx Renal Disease: Yes (HD TThS. Last HD)


Additional medical history: Glaucoma.  afib





- Surgical History


Additional Surgical History: Left A/V Graft





- Social History


Smoking Status: Never Smoker


Substance Use Type: None





- Medications


Home Medications: 


                                Home Medications











 Medication  Instructions  Recorded  Confirmed  Last Taken  Type


 


Cinacalcet [Sensipar] 30 mg PO QDAY #30 03/20/20  Unknown Rx


 


Epoetin Amadeo 20,000 Unit [Procrit] 20,000 unit SUB-Q Sa  vial 03/20/20  Unknown 

Rx


 


Epoetin Amadeo 20,000 Unit [Procrit] 20,000 unit SUB-Q Th  vial 03/20/20  Unknown 

Rx


 


Epoetin Amadeo 20,000 Unit [Procrit] 20,000 unit SUB-Q Tu  vial 03/20/20  Unknown 

Rx


 


Furosemide [Lasix TAB] 80 mg PO QDAY #30 03/20/20  Unknown Rx


 


NIFEdipine [Nifedipine ER] 90 mg PO QDAY #30 03/20/20  Unknown Rx


 


Pantoprazole [Protonix TAB] 40 mg PO QDAY #30 03/20/20  Unknown Rx


 


carvediloL [Coreg] 25 mg PO QDAY #30 03/20/20  Unknown Rx


 


cloNIDine [Catapres] 0.1 mg PO BID #60 tablet 03/20/20  Unknown Rx


 


hydrALAZINE [Apresoline TAB] 100 mg PO TID #90 tab 03/20/20  Unknown Rx














ED Physical Exam





- General


Limitations: Other


General appearance: alert, in no apparent distress





- Head


Head exam: Present: atraumatic, normocephalic





- Eye


Eye exam: Present: normal appearance, PERRL, EOMI





- ENT


ENT exam: Present: mucous membranes moist





- Neck


Neck exam: Present: normal inspection





- Respiratory


Respiratory exam: Present: normal lung sounds bilaterally.  Absent: respiratory 

distress





- Cardiovascular


Cardiovascular Exam: Present: regular rate, normal rhythm.  Absent: systolic 

murmur, diastolic murmur, rubs, gallop





- GI/Abdominal


GI/Abdominal exam: Present: soft, normal bowel sounds.  Absent: distended, 

tenderness





- Rectal


Rectal exam: Present: deferred





- Extremities Exam


Extremities exam: Present: normal inspection





- Back Exam


Back exam: Present: normal inspection





- Neurological Exam


Neurological exam: Present: alert, oriented X3, CN II-XII intact.  Absent: motor

sensory deficit





- Psychiatric


Psychiatric exam: Present: normal affect, normal mood





- Skin


Skin exam: Present: warm, dry, intact, normal color.  Absent: rash





ED Course


                                   Vital Signs











  08/18/21 08/18/21 08/18/21





  10:40 10:41 16:23


 


Temperature 98.8 F  98.2 F


 


Pulse Rate 92 H  84


 


Respiratory 16  20





Rate   


 


Blood Pressure   213/87


 


Blood Pressure  185/86 





[Right]   


 


O2 Sat by Pulse 93  96





Oximetry   














  08/18/21





  16:26


 


Temperature 98.2 F


 


Pulse Rate 89


 


Respiratory 20





Rate 


 


Blood Pressure 213/87


 


Blood Pressure 





[Right] 


 


O2 Sat by Pulse 96





Oximetry 














ED Medical Decision Making





- Lab Data


Result diagrams: 


                                 08/18/21 11:06





                                 08/18/21 11:06








                                   Lab Results











  08/18/21 08/18/21 08/18/21 Range/Units





  11:06 11:06 11:10 


 


WBC  7.6    (4.5-11.0)  K/mm3


 


RBC  2.05 L    (3.65-5.03)  M/mm3


 


Hgb  6.3 L    (11.8-15.2)  gm/dl


 


Hct  19.2 L*    (35.5-45.6)  %


 


MCV  94    (84-94)  fl


 


MCH  31    (28-32)  pg


 


MCHC  33    (32-34)  %


 


RDW  20.6 H    (13.2-15.2)  %


 


Plt Count  220    (140-440)  K/mm3


 


Lymph % (Auto)  5.4 L    (13.4-35.0)  %


 


Mono % (Auto)  8.0 H    (0.0-7.3)  %


 


Eos % (Auto)  5.5 H    (0.0-4.3)  %


 


Baso % (Auto)  1.1    (0.0-1.8)  %


 


Lymph # (Auto)  0.4 L    (1.2-5.4)  K/mm3


 


Mono # (Auto)  0.6    (0.0-0.8)  K/mm3


 


Eos # (Auto)  0.4    (0.0-0.4)  K/mm3


 


Baso # (Auto)  0.1    (0.0-0.1)  K/mm3


 


Seg Neutrophils %  80.0 H    (40.0-70.0)  %


 


Seg Neutrophils #  6.1    (1.8-7.7)  K/mm3


 


Sodium   139   (137-145)  mmol/L


 


Potassium   4.5   (3.6-5.0)  mmol/L


 


Chloride   99.4   ()  mmol/L


 


Carbon Dioxide   26   (22-30)  mmol/L


 


Anion Gap   18   mmol/L


 


BUN   40 H   (9-20)  mg/dL


 


Creatinine   8.7 H   (0.8-1.3)  mg/dL


 


Estimated GFR   8   ml/min


 


BUN/Creatinine Ratio   5   %


 


Glucose   96   ()  mg/dL


 


Calcium   7.9 L   (8.4-10.2)  mg/dL


 


Total Bilirubin   0.70   (0.1-1.2)  mg/dL


 


AST   34   (5-40)  units/L


 


ALT   15   (7-56)  units/L


 


Alkaline Phosphatase   182 H   ()  units/L


 


Total Protein   6.9   (6.3-8.2)  g/dL


 


Albumin   3.8 L   (3.9-5)  g/dL


 


Albumin/Globulin Ratio   1.2   %


 


Blood Type    O POSITIVE  


 


Antibody Screen    Negative  














- Medical Decision Making





Discussed plan of care with patient


Critical care attestation.: 


If time is entered above; I have spent that time in minutes in the direct care 

of this critically ill patient, excluding procedure time.








ED Disposition


Clinical Impression: 


 Anemia requiring transfusions, ESRD (end stage renal disease) on dialysis





Disposition: 09 ADMITTED AS INPATIENT


Is pt being admited?: Yes


Does the pt Need Aspirin: No


Condition: Fair


Referrals: 


GINGER MACIAS MD [Primary Care Provider] - 3-5 Days

## 2021-08-18 NOTE — EVENT NOTE
ED Screening Note


ED Screening Note: 








states he had blood work performed on monday and Hemoglobin was 6.8 


states he was last dialyzed on monday 


states he was not able to do dialysis today due to low hemoglobin


states he believes he last got a transfusion in march or april in [place] 


states he goes to [place], [place] for dialysis 


states he goes to Dr. Naranjo, nephrologist 








This initial assessment/diagnostic orders/clinical plan/treatment(s) is/are 

subject to change based on patients health status, clinical progression and 

re-assessment by fellow clinical providers in the ED. Further treatment and 

workup at subsequent clinical providers discretion. Patient/guardian urged not 

to elope from the ED as their condition may be serious if not clinically 

assessed and managed. 





Initial orders include: 


labs  show low hemoglobin charge nurse austin tinajero 


needs to go to MAIN ED for transfusion

## 2021-08-19 VITALS — DIASTOLIC BLOOD PRESSURE: 90 MMHG | SYSTOLIC BLOOD PRESSURE: 179 MMHG

## 2021-08-19 LAB
%HYPO/RBC NFR BLD AUTO: (no result) %
ANISOCYTOSIS BLD QL SMEAR: (no result)
BAND NEUTROPHILS # (MANUAL): 0 K/MM3
BUN SERPL-MCNC: 47 MG/DL (ref 9–20)
BUN/CREAT SERPL: 5 %
CALCIUM SERPL-MCNC: 8.5 MG/DL (ref 8.4–10.2)
HCT VFR BLD CALC: 30.5 % (ref 35.5–45.6)
HEMOLYSIS INDEX: 1
HGB BLD-MCNC: 9.9 GM/DL (ref 11.8–15.2)
MCHC RBC AUTO-ENTMCNC: 32 % (ref 32–34)
MCV RBC AUTO: 93 FL (ref 84–94)
MYELOCYTES # (MANUAL): 0 K/MM3
OVALOCYTES BLD QL SMEAR: (no result)
PLATELET # BLD: 183 K/MM3 (ref 140–440)
POIKILOCYTOSIS BLD QL SMEAR: (no result)
PROMYELOCYTES # (MANUAL): 0 K/MM3
RBC # BLD AUTO: 3.28 M/MM3 (ref 3.65–5.03)
TARGETS BLD QL SMEAR: (no result)
TOTAL CELLS COUNTED BLD: 100

## 2021-08-19 RX ADMIN — OXYCODONE AND ACETAMINOPHEN PRN TAB: 5; 325 TABLET ORAL at 18:42

## 2021-08-19 RX ADMIN — Medication SCH ML: at 11:11

## 2021-08-19 NOTE — DISCHARGE SUMMARY
Providers





- Providers


Date of Admission: 


08/18/21 18:00





Date of discharge: 08/19/21


Attending physician: 


REGINALD ARGUELLO





                                        





08/18/21 18:04


Consult to Physician [CONS] Routine 


   Comment: 


   Consulting Provider: TAWANNA BRUNNER


   Physician Instructions: 


   Reason For Exam: esrd











Primary care physician: 


GINGER MACIAS








Hospitalization


Condition: Fair


Disposition: 01 HOME / SELF CARE / HOMELESS





Exam





- Constitutional


Vitals: 


                                        











Temp Pulse Resp BP Pulse Ox


 


 98.2 F   79   24   174/84   99 


 


 08/18/21 16:26  08/19/21 13:01  08/19/21 13:01  08/19/21 13:01  08/19/21 13:01














Plan


Follow up with: 


GINGER MACIAS MD [Primary Care Provider] - 3-5 Days

## 2021-09-13 ENCOUNTER — HOSPITAL ENCOUNTER (INPATIENT)
Dept: HOSPITAL 5 - ED | Age: 51
LOS: 3 days | Discharge: HOME | DRG: 291 | End: 2021-09-16
Attending: STUDENT IN AN ORGANIZED HEALTH CARE EDUCATION/TRAINING PROGRAM | Admitting: STUDENT IN AN ORGANIZED HEALTH CARE EDUCATION/TRAINING PROGRAM
Payer: MEDICARE

## 2021-09-13 DIAGNOSIS — Z79.899: ICD-10-CM

## 2021-09-13 DIAGNOSIS — Z91.15: ICD-10-CM

## 2021-09-13 DIAGNOSIS — E11.22: ICD-10-CM

## 2021-09-13 DIAGNOSIS — E87.70: ICD-10-CM

## 2021-09-13 DIAGNOSIS — I13.2: Primary | ICD-10-CM

## 2021-09-13 DIAGNOSIS — N18.6: ICD-10-CM

## 2021-09-13 DIAGNOSIS — I16.1: ICD-10-CM

## 2021-09-13 DIAGNOSIS — H40.9: ICD-10-CM

## 2021-09-13 DIAGNOSIS — I48.0: ICD-10-CM

## 2021-09-13 DIAGNOSIS — I50.9: ICD-10-CM

## 2021-09-13 DIAGNOSIS — N25.81: ICD-10-CM

## 2021-09-13 DIAGNOSIS — Z99.2: ICD-10-CM

## 2021-09-13 DIAGNOSIS — D63.1: ICD-10-CM

## 2021-09-13 DIAGNOSIS — Z91.14: ICD-10-CM

## 2021-09-13 DIAGNOSIS — K31.84: ICD-10-CM

## 2021-09-13 LAB
ALBUMIN SERPL-MCNC: 3.7 G/DL (ref 3.9–5)
ALT SERPL-CCNC: 20 UNITS/L (ref 7–56)
BASOPHILS # (AUTO): 0 K/MM3 (ref 0–0.1)
BASOPHILS NFR BLD AUTO: 0.7 % (ref 0–1.8)
BUN SERPL-MCNC: 78 MG/DL (ref 9–20)
BUN/CREAT SERPL: 8 %
CALCIUM SERPL-MCNC: 8.5 MG/DL (ref 8.4–10.2)
EOSINOPHIL # BLD AUTO: 0.3 K/MM3 (ref 0–0.4)
EOSINOPHIL NFR BLD AUTO: 5.4 % (ref 0–4.3)
HCT VFR BLD CALC: 20.6 % (ref 35.5–45.6)
HCT VFR BLD CALC: 25.3 % (ref 35.5–45.6)
HDLC SERPL-MCNC: 54 MG/DL (ref 40–59)
HEMOLYSIS INDEX: 0
HGB BLD-MCNC: 6.8 GM/DL (ref 11.8–15.2)
HGB BLD-MCNC: 8.4 GM/DL (ref 11.8–15.2)
LYMPHOCYTES # BLD AUTO: 0.8 K/MM3 (ref 1.2–5.4)
LYMPHOCYTES NFR BLD AUTO: 14.8 % (ref 13.4–35)
MCHC RBC AUTO-ENTMCNC: 33 % (ref 32–34)
MCV RBC AUTO: 95 FL (ref 84–94)
MONOCYTES # (AUTO): 0.5 K/MM3 (ref 0–0.8)
MONOCYTES % (AUTO): 9.1 % (ref 0–7.3)
PLATELET # BLD: 190 K/MM3 (ref 140–440)
RBC # BLD AUTO: 2.17 M/MM3 (ref 3.65–5.03)

## 2021-09-13 PROCEDURE — G0378 HOSPITAL OBSERVATION PER HR: HCPCS

## 2021-09-13 PROCEDURE — 85018 HEMOGLOBIN: CPT

## 2021-09-13 PROCEDURE — 80074 ACUTE HEPATITIS PANEL: CPT

## 2021-09-13 PROCEDURE — 36430 TRANSFUSION BLD/BLD COMPNT: CPT

## 2021-09-13 PROCEDURE — 36415 COLL VENOUS BLD VENIPUNCTURE: CPT

## 2021-09-13 PROCEDURE — 80053 COMPREHEN METABOLIC PANEL: CPT

## 2021-09-13 PROCEDURE — 86900 BLOOD TYPING SEROLOGIC ABO: CPT

## 2021-09-13 PROCEDURE — 96365 THER/PROPH/DIAG IV INF INIT: CPT

## 2021-09-13 PROCEDURE — 83880 ASSAY OF NATRIURETIC PEPTIDE: CPT

## 2021-09-13 PROCEDURE — 86850 RBC ANTIBODY SCREEN: CPT

## 2021-09-13 PROCEDURE — 93306 TTE W/DOPPLER COMPLETE: CPT

## 2021-09-13 PROCEDURE — P9016 RBC LEUKOCYTES REDUCED: HCPCS

## 2021-09-13 PROCEDURE — 84484 ASSAY OF TROPONIN QUANT: CPT

## 2021-09-13 PROCEDURE — 85025 COMPLETE CBC W/AUTO DIFF WBC: CPT

## 2021-09-13 PROCEDURE — 5A1D70Z PERFORMANCE OF URINARY FILTRATION, INTERMITTENT, LESS THAN 6 HOURS PER DAY: ICD-10-PCS | Performed by: INTERNAL MEDICINE

## 2021-09-13 PROCEDURE — 93005 ELECTROCARDIOGRAM TRACING: CPT

## 2021-09-13 PROCEDURE — 80061 LIPID PANEL: CPT

## 2021-09-13 PROCEDURE — 71046 X-RAY EXAM CHEST 2 VIEWS: CPT

## 2021-09-13 PROCEDURE — 86901 BLOOD TYPING SEROLOGIC RH(D): CPT

## 2021-09-13 PROCEDURE — 86920 COMPATIBILITY TEST SPIN: CPT

## 2021-09-13 PROCEDURE — 80048 BASIC METABOLIC PNL TOTAL CA: CPT

## 2021-09-13 PROCEDURE — 85014 HEMATOCRIT: CPT

## 2021-09-13 RX ADMIN — HYDRALAZINE HYDROCHLORIDE SCH MG: 100 TABLET, FILM COATED ORAL at 21:35

## 2021-09-13 RX ADMIN — OXYCODONE AND ACETAMINOPHEN PRN TAB: 5; 325 TABLET ORAL at 22:46

## 2021-09-13 RX ADMIN — NIFEDIPINE SCH MG: 90 TABLET, EXTENDED RELEASE ORAL at 10:09

## 2021-09-13 RX ADMIN — CINACALCET HYDROCHLORIDE SCH MG: 30 TABLET, FILM COATED ORAL at 10:10

## 2021-09-13 RX ADMIN — DOCUSATE SODIUM SCH MG: 100 CAPSULE, LIQUID FILLED ORAL at 21:35

## 2021-09-13 RX ADMIN — PANTOPRAZOLE SODIUM SCH MG: 40 TABLET, DELAYED RELEASE ORAL at 10:11

## 2021-09-13 RX ADMIN — HYDRALAZINE HYDROCHLORIDE SCH: 100 TABLET, FILM COATED ORAL at 16:49

## 2021-09-13 RX ADMIN — FUROSEMIDE SCH: 10 INJECTION, SOLUTION INTRAVENOUS at 20:35

## 2021-09-13 RX ADMIN — FUROSEMIDE SCH MG: 10 INJECTION, SOLUTION INTRAVENOUS at 20:30

## 2021-09-13 RX ADMIN — Medication SCH ML: at 21:35

## 2021-09-13 RX ADMIN — HEPARIN SODIUM SCH UNIT: 5000 INJECTION, SOLUTION INTRAVENOUS; SUBCUTANEOUS at 21:35

## 2021-09-13 RX ADMIN — FUROSEMIDE SCH: 10 INJECTION, SOLUTION INTRAVENOUS at 16:48

## 2021-09-13 NOTE — EVENT NOTE
ED Screening Note


ED Screening Note: 





51-year-old male with end-stage renal disease, hypertension, diabetes presents 

emerged department complaining of chest pain resulting in him contacting EMS.  

Chest pain was resolved with nitro and aspirin.





This initial assessment/diagnostic orders/clinical plan/treatment(s) is/are 

subject to change based on patients health status, clinical progression and re-

assessment by fellow clinical providers in the ED. Further treatment and workup 

at subsequent clinical providers discretion. Patient/guardian urged not to elope

from the ED as their condition may be serious if not clinically assessed and 

managed. 





Initial orders include: 


Chest x-ray labs chest pain protocol

## 2021-09-13 NOTE — EMERGENCY DEPARTMENT REPORT
ED Chest Pain HPI





- General


Chief Complaint: Chest Pain


Stated Complaint: SOB/CHEST PAIN


Time Seen by Provider: 09/13/21 06:34


Source: patient, EMS


Mode of arrival: Stretcher


Limitations: No Limitations





- History of Present Illness


Initial Comments: 





51-year-old -American male presents to the emergency department via EMS 

from home with a complaint of shortness of breath and left-sided chest pain that

started yesterday morning.  The patient received a full dose aspirin and a 

sublingual nitroglycerin in route with EMS with only mild improvement.  He has a

past medical history that includes hypertension, paroxysmal atrial fibrillation,

CHF, anemia requiring transfusions, and end-stage renal disease on hemodialysis 

on Monday/Wednesday/Friday.  He follows with [facility] nephrology.  He 

says that he has seen a cardiologist "just down the street" in the past but 

cannot give me the name of the physician or group.  The patient also complains 

of bilateral lower extremity swelling.  No recent travel or sick contacts at 

home.  The patient is vaccinated against COVID-19.  He says that he recently had

a negative Covid test about 3 days ago.  Currently he says that his chest pain 

is 8 out of 10 in intensity.  No known aggravating or alleviating factors.  He 

denies any fever, cough, nausea, vomiting, diarrhea, abdominal pain.





- Related Data


                                  Previous Rx's











 Medication  Instructions  Recorded  Last Taken  Type


 


Epoetin Amadeo 20,000 Unit [Procrit] 20,000 unit SUB-Q Sa  vial 03/20/20 Unknown 

Rx


 


Epoetin Amadeo 20,000 Unit [Procrit] 20,000 unit SUB-Q Th  vial 03/20/20 Unknown 

Rx


 


Epoetin Amadeo 20,000 Unit [Procrit] 20,000 unit SUB-Q Tu  vial 03/20/20 Unknown 

Rx


 


Cinacalcet [Sensipar] 30 mg PO QDAY #30 08/19/21 Unknown Rx


 


Furosemide [Lasix TAB] 80 mg PO QDAY #30 08/19/21 Unknown Rx


 


NIFEdipine [Nifedipine ER] 90 mg PO QDAY #30 08/19/21 Unknown Rx


 


Pantoprazole [Protonix TAB] 40 mg PO QDAY #30 08/19/21 Unknown Rx


 


carvediloL [Coreg] 25 mg PO QDAY #30 08/19/21 Unknown Rx


 


cloNIDine [Catapres] 0.1 mg PO BID #60 tablet 08/19/21 Unknown Rx


 


hydrALAZINE [Apresoline TAB] 100 mg PO TID #90 tab 08/19/21 Unknown Rx











                                    Allergies











Allergy/AdvReac Type Severity Reaction Status Date / Time


 


No Known Allergies Allergy   Verified 08/18/21 10:37














Heart Score





- HEART Score


History: Slightly suspicious


EKG: Non-specific


Age: 45-65


Risk factors: 1-2 risk factors


Troponin: > 3x normal limit


HEART Score: 5





- EKG Read Time


Time EKG Completed: 05:45


EKG Read Time: 05:50





- Critical Actions


Critical Actions: 4-6 pts:12-16.6% risk of adverse cardiac event. Should be adm

itted





ED Review of Systems


ROS: 


Stated complaint: SOB/CHEST PAIN


Other details as noted in HPI





Comment: All other systems reviewed and negative


Constitutional: denies: chills, fever


Eyes: denies: eye pain, vision change


ENT: denies: ear pain, throat pain


Respiratory: shortness of breath.  denies: cough


Cardiovascular: chest pain, palpitations, edema


Gastrointestinal: denies: abdominal pain, vomiting


Genitourinary: denies: dysuria, discharge


Musculoskeletal: denies: back pain, arthralgia


Skin: denies: rash, lesions


Neurological: denies: headache, weakness





ED Past Medical Hx





- Past Medical History


Hx Hypertension: Yes


Hx Heart Attack/AMI: No


Hx Renal Disease: Yes (HD TThS. Last HD)


Additional medical history: Glaucoma.  afib





- Surgical History


Additional Surgical History: Left A/V Graft





- Social History


Smoking Status: Never Smoker


Substance Use Type: None





- Medications


Home Medications: 


                                Home Medications











 Medication  Instructions  Recorded  Confirmed  Last Taken  Type


 


Epoetin Amadeo 20,000 Unit [Procrit] 20,000 unit SUB-Q Sa  vial 03/20/20  Unknown 

Rx


 


Epoetin Amadeo 20,000 Unit [Procrit] 20,000 unit SUB-Q Th  vial 03/20/20  Unknown 

Rx


 


Epoetin Amadeo 20,000 Unit [Procrit] 20,000 unit SUB-Q Tu  vial 03/20/20  Unknown 

Rx


 


Cinacalcet [Sensipar] 30 mg PO QDAY #30 08/19/21  Unknown Rx


 


Furosemide [Lasix TAB] 80 mg PO QDAY #30 08/19/21  Unknown Rx


 


NIFEdipine [Nifedipine ER] 90 mg PO QDAY #30 08/19/21  Unknown Rx


 


Pantoprazole [Protonix TAB] 40 mg PO QDAY #30 08/19/21  Unknown Rx


 


carvediloL [Coreg] 25 mg PO QDAY #30 08/19/21  Unknown Rx


 


cloNIDine [Catapres] 0.1 mg PO BID #60 tablet 08/19/21  Unknown Rx


 


hydrALAZINE [Apresoline TAB] 100 mg PO TID #90 tab 08/19/21  Unknown Rx














ED Physical Exam





- General


Limitations: No Limitations





- Other


Other exam information: 





GENERAL: The patient is well-developed well-nourished.


HENT: Normocephalic.  Atraumatic.    Patient has moist mucous membranes.


EYES: Extraocular motions are intact.  


NECK: Supple. Trachea is midline.


CHEST/LUNGS: Coarse breath sounds throughout the chest.  No tachypnea or 

accessory muscle use. 


HEART/CARDIOVASCULAR: Regular.  There is no tachycardia.  There is no murmur.


ABDOMEN: Abdomen is soft, nontender.  Patient has normal bowel sounds.  There is

no abdominal distention.


SKIN: Skin is warm and dry.  1-2+ pitting edema to the bilateral lower 

extremities.


NEURO: The patient is awake, alert, and oriented.  The patient is cooperative.  

The patient has no focal neurologic deficits.  Normal speech.


MUSCULOSKELETAL: There is no tenderness or deformity.  There is no limitation 

range of motion. 





ED Course


                                   Vital Signs











  09/13/21 09/13/21





  01:42 07:11


 


Temperature 98.2 F 


 


Pulse Rate 85 75


 


Respiratory 20 





Rate  


 


Blood Pressure 199/106 213/121


 


O2 Sat by Pulse 100 





Oximetry  














BETHANY score





- Bethany Score


Age > 65: (0) No


Aspirin use within the Past 7 Days: (0) No


3 or more CAD Risk Factors: (1) Yes


2 or more Angina events in past 24 hrs: (1) Yes


Known CAD with more than 50% Stenosis: (0) No


Elevated Cardiac Markers: (1) Yes


ST Deviation Greater than 0.5mm: (0) No


BETHANY Score: 3





ED Medical Decision Making





- Lab Data


Result diagrams: 


                                 09/13/21 02:31





                                 09/13/21 02:31








                                   Lab Results











  09/13/21 09/13/21 09/13/21 Range/Units





  02:31 02:31 04:41 


 


WBC  5.7    (4.5-11.0)  K/mm3


 


RBC  2.17 L    (3.65-5.03)  M/mm3


 


Hgb  6.8 L    (11.8-15.2)  gm/dl


 


Hct  20.6 L    (35.5-45.6)  %


 


MCV  95 H    (84-94)  fl


 


MCH  31    (28-32)  pg


 


MCHC  33    (32-34)  %


 


RDW  20.3 H    (13.2-15.2)  %


 


Plt Count  190    (140-440)  K/mm3


 


Lymph % (Auto)  14.8    (13.4-35.0)  %


 


Mono % (Auto)  9.1 H    (0.0-7.3)  %


 


Eos % (Auto)  5.4 H    (0.0-4.3)  %


 


Baso % (Auto)  0.7    (0.0-1.8)  %


 


Lymph # (Auto)  0.8 L    (1.2-5.4)  K/mm3


 


Mono # (Auto)  0.5    (0.0-0.8)  K/mm3


 


Eos # (Auto)  0.3    (0.0-0.4)  K/mm3


 


Baso # (Auto)  0.0    (0.0-0.1)  K/mm3


 


Seg Neutrophils %  70.0    (40.0-70.0)  %


 


Seg Neutrophils #  4.0    (1.8-7.7)  K/mm3


 


Sodium   139   (137-145)  mmol/L


 


Potassium   3.7   (3.6-5.0)  mmol/L


 


Chloride   98.1   ()  mmol/L


 


Carbon Dioxide   26   (22-30)  mmol/L


 


Anion Gap   19   mmol/L


 


BUN   78 H   (9-20)  mg/dL


 


Creatinine   9.3 H   (0.8-1.3)  mg/dL


 


Estimated GFR   7   ml/min


 


BUN/Creatinine Ratio   8   %


 


Glucose   96   ()  mg/dL


 


Calcium   8.5   (8.4-10.2)  mg/dL


 


Total Bilirubin   0.40   (0.1-1.2)  mg/dL


 


AST   22   (5-40)  units/L


 


ALT   20   (7-56)  units/L


 


Alkaline Phosphatase   280 H   ()  units/L


 


Troponin T   0.085 H  0.092 H  (0.00-0.029)  ng/mL


 


Total Protein   6.6   (6.3-8.2)  g/dL


 


Albumin   3.7 L   (3.9-5)  g/dL


 


Albumin/Globulin Ratio   1.3   %


 


Triglycerides   39   (2-149)  mg/dL


 


Cholesterol   116   ()  mg/dL


 


LDL Cholesterol Direct   66   ()  mg/dL


 


HDL Cholesterol   54   (40-59)  mg/dL


 


Cholesterol/HDL Ratio   2.14   %


 


Blood Type     


 


Antibody Screen     


 


Crossmatch     














  09/13/21 Range/Units





  06:47 


 


WBC   (4.5-11.0)  K/mm3


 


RBC   (3.65-5.03)  M/mm3


 


Hgb   (11.8-15.2)  gm/dl


 


Hct   (35.5-45.6)  %


 


MCV   (84-94)  fl


 


MCH   (28-32)  pg


 


MCHC   (32-34)  %


 


RDW   (13.2-15.2)  %


 


Plt Count   (140-440)  K/mm3


 


Lymph % (Auto)   (13.4-35.0)  %


 


Mono % (Auto)   (0.0-7.3)  %


 


Eos % (Auto)   (0.0-4.3)  %


 


Baso % (Auto)   (0.0-1.8)  %


 


Lymph # (Auto)   (1.2-5.4)  K/mm3


 


Mono # (Auto)   (0.0-0.8)  K/mm3


 


Eos # (Auto)   (0.0-0.4)  K/mm3


 


Baso # (Auto)   (0.0-0.1)  K/mm3


 


Seg Neutrophils %   (40.0-70.0)  %


 


Seg Neutrophils #   (1.8-7.7)  K/mm3


 


Sodium   (137-145)  mmol/L


 


Potassium   (3.6-5.0)  mmol/L


 


Chloride   ()  mmol/L


 


Carbon Dioxide   (22-30)  mmol/L


 


Anion Gap   mmol/L


 


BUN   (9-20)  mg/dL


 


Creatinine   (0.8-1.3)  mg/dL


 


Estimated GFR   ml/min


 


BUN/Creatinine Ratio   %


 


Glucose   ()  mg/dL


 


Calcium   (8.4-10.2)  mg/dL


 


Total Bilirubin   (0.1-1.2)  mg/dL


 


AST   (5-40)  units/L


 


ALT   (7-56)  units/L


 


Alkaline Phosphatase   ()  units/L


 


Troponin T   (0.00-0.029)  ng/mL


 


Total Protein   (6.3-8.2)  g/dL


 


Albumin   (3.9-5)  g/dL


 


Albumin/Globulin Ratio   %


 


Triglycerides   (2-149)  mg/dL


 


Cholesterol   ()  mg/dL


 


LDL Cholesterol Direct   ()  mg/dL


 


HDL Cholesterol   (40-59)  mg/dL


 


Cholesterol/HDL Ratio   %


 


Blood Type  O POSITIVE  


 


Antibody Screen  Negative  


 


Crossmatch  See Detail  














- EKG Data


-: EKG Interpreted by Me


EKG shows normal: sinus rhythm, axis, intervals (Prolonged AK interval), QRS 

complexes (LVH), ST-T waves


Rate: normal





- EKG Data


When compared to previous EKG there are: no significant change


Interpretation: unchanged when compared t (3/19/20)





- Radiology Data


Radiology results: image reviewed


interpreted by me: 





Chest x-ray shows LVH, pulmonary vascular congestion and some interstitial 

edema.





- Medical Decision Making





This patient presents to the emergency department with complaint of chest pain 

and shortness of breath.  He is due for dialysis today and does appear volume 

overloaded.  However the patient does not appear in any respiratory distress.  

He presents with extremely elevated blood pressure with a systolic greater than 

200.  Patient has coarse breath sounds and bilateral lower extremity pitting 

edema.





EKG did not have any morphology consistent with ST elevation myocardial 

infarction.





Labs show anemia with a hemoglobin of 6.8 and 1 unit of packed red blood cells 

has been ordered for transfusion.  Patient has renal insufficiency consistent 

with his end-stage renal disease on hemodialysis.  The patient has elevated 

troponin levels that may be secondary to his renal insufficiency, but the 

patient does present here with the complaint of chest pain.





Chest x-ray looks like volume overload.  No obvious pneumonia.  No pneumothorax 

or widened mediastinum.





Nephrology contacted and consulted.  He will be admitted to the hospital for 

further evaluation and treatment and was accepted for admission by the 

hospitalist service.


Critical Care Time: Yes


Critical care time in (mins) excluding proc time.: 35


Critical care attestation.: 


If time is entered above; I have spent that time in minutes in the direct care 

of this critically ill patient, excluding procedure time.


Critical care time was spent on this patient during his initial evaluation, 

multiple reevaluations, ordering interpretation of labs and imaging, ordering of

packed red blood cells for transfusion, discussion with nephrology and the 

hospitalist services.


Critical Care Time: 





35 minutes





ED Disposition


Clinical Impression: 


 ESRD needing dialysis, Acute chest pain, Hypertensive urgency, Anemia requiring

transfusions, Fluid overload, Severe anemia





Disposition: 09 ADMITTED AS INPATIENT


Is pt being admited?: Yes


Condition: Serious


Time of Disposition: 07:26

## 2021-09-13 NOTE — CONSULTATION
History of Present Illness





- Reason for Consult


Consult date: 09/13/21


end stage renal disease





- History of Present Illness





51-year-old -American male w ESRD, CHF and atrial fibrillation who 

presents to the emergency department via EMS from home with a complaint of 

shortness of breath and left-sided chest pain that started yesterday morning. 





The patient also complains of bilateral lower extremity swelling.  No recent 

travel or sick contacts at home.  The patient is vaccinated against COVID-19.  

He says that he recently had a negative Covid test about 3 days ago.  Currently 

he says that his chest pain is 8 out of 10 in intensity.  No known aggravating 

or alleviating factors.  He denies any fever, cough, nausea, vomiting, diarrhea,

abdominal pain.








Patient's last complete dialysis treatment was on Aug 27th.  Patient also missed

dialysis on Sept 6th - as such, he was appx 3 kg over his DW on Sept 10th upon 

completion of treatment





Past History


Past Medical History: atrial fib, ESRD, heart failure, hypertension


Past Surgical History: Other (AV access)


Social history: no significant social history


Family history: no significant family history





Medications and Allergies


                                    Allergies











Allergy/AdvReac Type Severity Reaction Status Date / Time


 


No Known Allergies Allergy   Verified 08/18/21 10:37











                                Home Medications











 Medication  Instructions  Recorded  Confirmed  Last Taken  Type


 


Epoetin Amadeo 20,000 Unit [Procrit] 20,000 unit SUB-Q Sa  vial 03/20/20  Unknown 

Rx


 


Epoetin Amadeo 20,000 Unit [Procrit] 20,000 unit SUB-Q Th  vial 03/20/20  Unknown 

Rx


 


Epoetin Amadeo 20,000 Unit [Procrit] 20,000 unit SUB-Q Tu  vial 03/20/20  Unknown 

Rx


 


Cinacalcet [Sensipar] 30 mg PO QDAY #30 08/19/21  Unknown Rx


 


Furosemide [Lasix TAB] 80 mg PO QDAY #30 08/19/21  Unknown Rx


 


NIFEdipine [Nifedipine ER] 90 mg PO QDAY #30 08/19/21  Unknown Rx


 


Pantoprazole [Protonix TAB] 40 mg PO QDAY #30 08/19/21  Unknown Rx


 


carvediloL [Coreg] 25 mg PO QDAY #30 08/19/21  Unknown Rx


 


cloNIDine [Catapres] 0.1 mg PO BID #60 tablet 08/19/21  Unknown Rx


 


hydrALAZINE [Apresoline TAB] 100 mg PO TID #90 tab 08/19/21  Unknown Rx











Active Meds: 


Active Medications





Carvedilol (Carvedilol 25 Mg Tab)  25 mg PO QDAY ARRON


Cinacalcet (Cinacalcet 30 Mg Tab)  30 mg PO QDAY ARRON


Clonidine HCl (Clonidine 0.1 Mg Tab)  0.1 mg PO BID ARRON


Furosemide (Furosemide 40 Mg/4 Ml Inj)  40 mg IV 0600,1800 ARRON


Hydralazine HCl (Hydralazine 100 Mg Tab)  100 mg PO TID ARRON


Sodium Chloride (Nacl 0.9%)  100 mls @ 999 mls/hr IV QUITA PRN


   PRN Reason: Hypotension


Labetalol HCl (Labetalol 20 Mg/4 Ml Inj)  10 mg IV Q4H PRN


   PRN Reason: sbp > 160, hold for HR < 70


Nifedipine (Nifedipine Xl 90 Mg Tab)  90 mg PO QDAY ARRON


Pantoprazole Sodium (Pantoprazole 40 Mg Tab)  40 mg PO QDAC ARRON











Review of Systems


All systems: negative





Exam





- Vital Signs


Vital signs: 


                                   Vital Signs











Temp Pulse Resp BP Pulse Ox


 


 98.2 F   85   20   199/106   100 


 


 09/13/21 01:42  09/13/21 01:42  09/13/21 01:42  09/13/21 01:42  09/13/21 01:42














- General Appearance


General appearance: well-developed, well-nourished


EENT: ATNC


Respiratory: Clear to Ascultation


Heart: regular, S1S2


Gastrointestinal: Present: normal.  Absent: tenderness, distended


Integumentary: warm and dry


Neurologic: alert and oriented x3


Musculoskeletal: Present: other (+ edema)





Results





- Lab Results





                                 09/13/21 02:31





                                 09/13/21 02:31


                             Most recent lab results











Calcium  8.5 mg/dL (8.4-10.2)   09/13/21  02:31    














Assessment and Plan





Impression:





* End stage renal disease


* Accelerated hypertension


* Fluid overload


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


* Secondary hyperparathyroidism


* Medical noncompliance - patient's last complete dialysis treatment prior to 

  admission was on Aug 27th.  Patient also missed dialysis on Sept 6th - as 

  such, he was appx 3 kg over his DW upon completion of treatment on Sept 10th 





Plan:


* Hemodialysis today - UF as tolerated


* May require HD again tomorrow


* Transfuse pRBC prn - note order


* Epogen with dialysis as needed


* Renal diet

## 2021-09-13 NOTE — HISTORY AND PHYSICAL REPORT
History of Present Illness


Date of examination: 09/13/21


Date of admission: 


9/13/2021


Chief complaint: 





Shortness of breath


History of present illness: 





51-year-old -American male with past medical history of ESRD on HD, 

congestive heart failure, gastroparesis, paroxysmal atrial fibrillation 

presenting to our facility with complaint of shortness of breath and left-sided 

chest pain onset yesterday.  Patient states that symptoms of chest pain were 

intermittent.  Shortness of breath was associated with lower extremity edema, 

fatigue, weakness.  He states that he makes walking difficult.  He denied any 

fevers or chills.  He denied any nausea, vomiting, diarrhea, constipation.  He 

denied any exposure to sick persons.  He had a negative Covid test 3 days ago 

and is vaccinated against Covid.





Per nephrology note, patient's last dialysis treatment was August 27.  Patient 

also missed dialysis on September 6 and is 3 kg currently over his dry weight on

September 10.





ED Course:  cc IV, metoprolol 5 mg IV, nitro 0.4 mg SL x1, nephrology 

consultation





PMHx:ESRD on HD, congestive heart failure, gastroparesis, paroxysmal atrial 

fibrillation





PSHx: Right arm fistula placement





FHx: Reviewed noncontributory





SHx:


Tobacco use-denies


ETOH Use-denies


Recreational Drug Use-denies


Nephrologistfollows with Dr. Jose A Bingham seen Dr. Gregory Li


Lives with sister Mariela Barker








Medications and Allergies


                                    Allergies











Allergy/AdvReac Type Severity Reaction Status Date / Time


 


No Known Allergies Allergy   Verified 08/18/21 10:37











                                Home Medications











 Medication  Instructions  Recorded  Confirmed  Last Taken  Type


 


Epoetin Amadeo 20,000 Unit [Procrit] 20,000 unit SUB-Q Sa  vial 03/20/20  Unknown 

Rx


 


Epoetin Amadeo 20,000 Unit [Procrit] 20,000 unit SUB-Q Th  vial 03/20/20  Unknown 

Rx


 


Epoetin Amadeo 20,000 Unit [Procrit] 20,000 unit SUB-Q Tu  vial 03/20/20  Unknown 

Rx


 


Cinacalcet [Sensipar] 30 mg PO QDAY #30 08/19/21  Unknown Rx


 


Furosemide [Lasix TAB] 80 mg PO QDAY #30 08/19/21  Unknown Rx


 


NIFEdipine [Nifedipine ER] 90 mg PO QDAY #30 08/19/21  Unknown Rx


 


Pantoprazole [Protonix TAB] 40 mg PO QDAY #30 08/19/21  Unknown Rx


 


carvediloL [Coreg] 25 mg PO QDAY #30 08/19/21  Unknown Rx


 


cloNIDine [Catapres] 0.1 mg PO BID #60 tablet 08/19/21  Unknown Rx


 


hydrALAZINE [Apresoline TAB] 100 mg PO TID #90 tab 08/19/21  Unknown Rx














Review of Systems


All systems: negative


Constitutional: weight gain, fatigue, weakness


Cardiovascular: chest pain, edema, shortness of breath





Exam





- Physical Exam


Narrative exam: 





Physical Exam:


Constitutional: Alert, cooperative. No acute distress


Head, Ears, Nose: Normocephalic, atraumatic. External ears, nose normal


Eyes: Conjunctivae/corneas clear. No icterus. No ptosis.


Neck: Supple, no meningeal signs


Oral: dentition fair, no thrush


Cardiovascular: S1, S2 normal. 


Respiratory: Good air entry, bilateral rhonchi


GI: Soft, non-tender; bowel sounds normal. No peritoneal signs. 


Musculoskeletal: Bilateral lower extremity edema, no cyanosis.


Skin: No rash or abscess, see nursing assessment for full skin exam


Hem/Lymphatic: No palpable cervical or supraclavicular nodes. No lymphangitis


Psych: Mood ok. Affect normal


Neurological: Awake, alert, oriented. No gross abnormality





- Constitutional


Vitals: 


                                        











Temp Pulse Resp BP Pulse Ox


 


 98.2 F   75   20   213/121   100 


 


 09/13/21 01:42  09/13/21 07:11  09/13/21 01:42  09/13/21 07:11  09/13/21 01:42














HEART Score





- HEART Score


EKG: Non-specific


Age: 45-65


Risk factors: 1-2 risk factors


Troponin: 


                                        











Troponin T  0.092 ng/mL (0.00-0.029)  H  09/13/21  04:41    











Troponin: > 3x normal limit





- Critical Actions


Critical Actions: 4-6 pts:12-16.6% risk of adverse cardiac event. Should be 

admitted





Results





- Labs


CBC & Chem 7: 


                                 09/13/21 02:31





                                 09/13/21 02:31


Labs: 


                             Laboratory Last Values











WBC  5.7 K/mm3 (4.5-11.0)   09/13/21  02:31    


 


RBC  2.17 M/mm3 (3.65-5.03)  L  09/13/21  02:31    


 


Hgb  6.8 gm/dl (11.8-15.2)  L  09/13/21  02:31    


 


Hct  20.6 % (35.5-45.6)  L  09/13/21  02:31    


 


MCV  95 fl (84-94)  H  09/13/21  02:31    


 


MCH  31 pg (28-32)   09/13/21  02:31    


 


MCHC  33 % (32-34)   09/13/21  02:31    


 


RDW  20.3 % (13.2-15.2)  H  09/13/21  02:31    


 


Plt Count  190 K/mm3 (140-440)   09/13/21  02:31    


 


Lymph % (Auto)  14.8 % (13.4-35.0)   09/13/21  02:31    


 


Mono % (Auto)  9.1 % (0.0-7.3)  H  09/13/21  02:31    


 


Eos % (Auto)  5.4 % (0.0-4.3)  H  09/13/21  02:31    


 


Baso % (Auto)  0.7 % (0.0-1.8)   09/13/21  02:31    


 


Lymph # (Auto)  0.8 K/mm3 (1.2-5.4)  L  09/13/21  02:31    


 


Mono # (Auto)  0.5 K/mm3 (0.0-0.8)   09/13/21  02:31    


 


Eos # (Auto)  0.3 K/mm3 (0.0-0.4)   09/13/21  02:31    


 


Baso # (Auto)  0.0 K/mm3 (0.0-0.1)   09/13/21  02:31    


 


Seg Neutrophils %  70.0 % (40.0-70.0)   09/13/21  02:31    


 


Seg Neutrophils #  4.0 K/mm3 (1.8-7.7)   09/13/21  02:31    


 


Sodium  139 mmol/L (137-145)   09/13/21  02:31    


 


Potassium  3.7 mmol/L (3.6-5.0)   09/13/21  02:31    


 


Chloride  98.1 mmol/L ()   09/13/21  02:31    


 


Carbon Dioxide  26 mmol/L (22-30)   09/13/21  02:31    


 


Anion Gap  19 mmol/L  09/13/21  02:31    


 


BUN  78 mg/dL (9-20)  H  09/13/21  02:31    


 


Creatinine  9.3 mg/dL (0.8-1.3)  H  09/13/21  02:31    


 


Estimated GFR  7 ml/min  09/13/21  02:31    


 


BUN/Creatinine Ratio  8 %  09/13/21  02:31    


 


Glucose  96 mg/dL ()   09/13/21  02:31    


 


Calcium  8.5 mg/dL (8.4-10.2)   09/13/21  02:31    


 


Total Bilirubin  0.40 mg/dL (0.1-1.2)   09/13/21  02:31    


 


AST  22 units/L (5-40)   09/13/21  02:31    


 


ALT  20 units/L (7-56)   09/13/21  02:31    


 


Alkaline Phosphatase  280 units/L ()  H  09/13/21  02:31    


 


Troponin T  0.092 ng/mL (0.00-0.029)  H  09/13/21  04:41    


 


Total Protein  6.6 g/dL (6.3-8.2)   09/13/21  02:31    


 


Albumin  3.7 g/dL (3.9-5)  L  09/13/21  02:31    


 


Albumin/Globulin Ratio  1.3 %  09/13/21  02:31    


 


Triglycerides  39 mg/dL (2-149)   09/13/21  02:31    


 


Cholesterol  116 mg/dL ()   09/13/21  02:31    


 


LDL Cholesterol Direct  66 mg/dL ()   09/13/21  02:31    


 


HDL Cholesterol  54 mg/dL (40-59)   09/13/21  02:31    


 


Cholesterol/HDL Ratio  2.14 %  09/13/21  02:31    


 


Blood Type  O POSITIVE   09/13/21  06:47    


 


Crossmatch  See Detail   09/13/21  06:47    














Assessment and Plan


Assessment and plan: 





1. Lower Extremity Edema


Suspect in the setting of medical noncompliance and missed hemodialysis 

sessions


Patient is oliguric


Chest x-ray demonstrates bilateral opacities


BNP ordered, troponins are negative, no obvious ST elevation or T wave 

inversion on EKG


Nephrology consulted for hemodialysis.  Plan to complete today and possibly 

tomorrow as well.





2. ESRD on hemodialysis, M/W/F


Nephrology consulted





3. hypertensive emergency


-Resume home antihypertensives


Labetalol IV as needed





4.  Anemia of chronic kidney disease


Denies any hematemesis, melena, hematochezia or bleeding otherwise


Hemoglobin on admission 6.8


1 unit PRBC ordered, follow-up repeat H&H


- Transfuse prn, maintain hgb > 7.


Appears to be on Epogen outpatient





5.  Paroxysmal A. fib


 Self reports history of paroxysmal A. fib however doees not take any 

medications


- no episodes of tachycardia or atrial fibrillation noted this admission


 Continuous telemetry


  


6.  Gastroparesis


- has seen Dr. Gregory Li


- cristobaln zofran


- sucralfate ordered with meals





7.  History of glaucoma





#8 history of noncompliance


Admits to not taking furosemide regularly or other anti-hypertensives


Admits to missing hemodialysis sessions





Full code


Renal diet


DVT prophylaxis: Heparin 5000 units subcu twice daily





- Patient Problems


(1) Bilateral lower extremity edema


Current Visit: Yes   Status: Acute   





(2) Hypertensive emergency


Current Visit: Yes   Status: Acute   





(3) Anemia due to end stage renal disease


Current Visit: Yes   Status: Acute   





(4) Gastroparesis


Current Visit: Yes   Status: Acute   





(5) End-stage renal disease needing dialysis


Current Visit: Yes   Status: Acute   





(6) Shortness of breath


Current Visit: No   Status: Acute   





(7) Noncompliance


Current Visit: Yes   Status: Acute   





(8) Paroxysmal A-fib


Current Visit: Yes   Status: Acute

## 2021-09-13 NOTE — ELECTROCARDIOGRAPH REPORT
[facility]

                                       

Test Date:    2021               Test Time:    05:45:17

Pat Name:     RONEY RODRIGUEZ             Department:   

Patient ID:   [id number]          Room:          

Gender:       M                        Technician:   STEVIE

:          1970               Requested By: LAURENCE THURSTON

Order Number: [id number]              Reading MD:   Lita Black

                                 Measurements

Intervals                              Axis          

Rate:         78                       P:            62

DE:           215                      QRS:          43

QRSD:         97                       T:            49

QT:           382                                    

QTc:          437                                    

                           Interpretive Statements

Sinus rhythm

Prolonged DE interval

Probable left atrial enlargement

Left ventricular hypertrophy

No previous ECG available for comparison

Electronically Signed On 2021 9:03:28 EDT by Lita Black

## 2021-09-14 LAB
ALBUMIN SERPL-MCNC: 3.7 G/DL (ref 3.9–5)
ALT SERPL-CCNC: 18 UNITS/L (ref 7–56)
BASOPHILS # (AUTO): 0 K/MM3 (ref 0–0.1)
BASOPHILS NFR BLD AUTO: 0.5 % (ref 0–1.8)
BUN SERPL-MCNC: 44 MG/DL (ref 9–20)
BUN/CREAT SERPL: 7 %
CALCIUM SERPL-MCNC: 8.7 MG/DL (ref 8.4–10.2)
EOSINOPHIL # BLD AUTO: 0.2 K/MM3 (ref 0–0.4)
EOSINOPHIL NFR BLD AUTO: 3.4 % (ref 0–4.3)
HCT VFR BLD CALC: 24.9 % (ref 35.5–45.6)
HEMOLYSIS INDEX: 1
HGB BLD-MCNC: 8.3 GM/DL (ref 11.8–15.2)
LYMPHOCYTES # BLD AUTO: 0.8 K/MM3 (ref 1.2–5.4)
LYMPHOCYTES NFR BLD AUTO: 11.8 % (ref 13.4–35)
MCHC RBC AUTO-ENTMCNC: 33 % (ref 32–34)
MCV RBC AUTO: 92 FL (ref 84–94)
MONOCYTES # (AUTO): 0.7 K/MM3 (ref 0–0.8)
MONOCYTES % (AUTO): 10 % (ref 0–7.3)
PLATELET # BLD: 218 K/MM3 (ref 140–440)
RBC # BLD AUTO: 2.7 M/MM3 (ref 3.65–5.03)

## 2021-09-14 PROCEDURE — 5A1D70Z PERFORMANCE OF URINARY FILTRATION, INTERMITTENT, LESS THAN 6 HOURS PER DAY: ICD-10-PCS | Performed by: INTERNAL MEDICINE

## 2021-09-14 RX ADMIN — HEPARIN SODIUM SCH: 5000 INJECTION, SOLUTION INTRAVENOUS; SUBCUTANEOUS at 10:58

## 2021-09-14 RX ADMIN — HEPARIN SODIUM SCH UNIT: 5000 INJECTION, SOLUTION INTRAVENOUS; SUBCUTANEOUS at 23:08

## 2021-09-14 RX ADMIN — DOCUSATE SODIUM SCH: 100 CAPSULE, LIQUID FILLED ORAL at 10:58

## 2021-09-14 RX ADMIN — HYDRALAZINE HYDROCHLORIDE SCH MG: 100 TABLET, FILM COATED ORAL at 07:32

## 2021-09-14 RX ADMIN — Medication SCH ML: at 23:08

## 2021-09-14 RX ADMIN — PANTOPRAZOLE SODIUM SCH MG: 40 TABLET, DELAYED RELEASE ORAL at 07:02

## 2021-09-14 RX ADMIN — CINACALCET HYDROCHLORIDE SCH: 30 TABLET, FILM COATED ORAL at 10:59

## 2021-09-14 RX ADMIN — FUROSEMIDE SCH MG: 10 INJECTION, SOLUTION INTRAVENOUS at 06:02

## 2021-09-14 RX ADMIN — HYDRALAZINE HYDROCHLORIDE SCH: 100 TABLET, FILM COATED ORAL at 18:19

## 2021-09-14 RX ADMIN — HYDRALAZINE HYDROCHLORIDE SCH MG: 100 TABLET, FILM COATED ORAL at 23:11

## 2021-09-14 RX ADMIN — OXYCODONE AND ACETAMINOPHEN PRN TAB: 5; 325 TABLET ORAL at 06:02

## 2021-09-14 RX ADMIN — NIFEDIPINE SCH: 90 TABLET, EXTENDED RELEASE ORAL at 10:58

## 2021-09-14 RX ADMIN — Medication SCH: at 10:59

## 2021-09-14 RX ADMIN — OXYCODONE AND ACETAMINOPHEN PRN TAB: 5; 325 TABLET ORAL at 23:09

## 2021-09-14 RX ADMIN — FUROSEMIDE SCH: 10 INJECTION, SOLUTION INTRAVENOUS at 18:19

## 2021-09-14 RX ADMIN — DOCUSATE SODIUM SCH MG: 100 CAPSULE, LIQUID FILLED ORAL at 23:07

## 2021-09-14 NOTE — PROGRESS NOTE
Assessment and Plan


Assessment and plan: 





Mr. Barker is a 51-year-old male history of ESRD on HD, CHF, atrial 

fibrillation, and anemia of chronic disease who presented with shortness of 

breath and lower extremity edema after missing multiple dialysis appointments.  

Currently stable after 2 HD sessions.





#Volume overload


-Likely secondary to missed dialysis sessions, improving


-EKGs negative for acute ST changes, troponin also negative


-BNP greater than 70,000





#ESRD on HD, MWF


-HD 9/13 and 9/14


-Nephrology following, appreciate assistance


-will resume HD schedule at discharge





#Hypertensive emergency with history of hypertension


-Continue home antihypertensives





#Anemia of chronic kidney disease


-status post 1 unit PRBC


-transfuse to goal hemoglobin greater than 7


-continue Epogen with HD





#Paroxysmal A. fib


-NSR this admission


-Continue telemetry





#Gastroparesis


-As needed Zofran, sucralfate with meals





#History of noncompliance


-patient admits to not taking medications and missing hemodialysis sessions


-Currently experiencing issues with transportation, will discuss with social 

work


Disposition Plan: Home with sister after discharge


Total Time Spent with Patient (Minutes): 30 minutes





History


Interval history: 


No acute events overnight.  Patient currently stable on room air.  Reports 

feeling much better after dialysis.  No current chest pain or lower extremity 

swelling.








Hospitalist Physical





- Constitutional


Vitals: 


                                        











Temp Pulse Resp BP Pulse Ox


 


 98.0 F   81   18   181/103   100 


 


 09/14/21 14:30  09/14/21 14:30  09/14/21 14:30  09/14/21 14:30  09/14/21 14:30











General appearance: Present: no acute distress





- Respiratory


Respiratory effort: normal


Respiratory: bilateral: CTA





- Cardiovascular


Rhythm: regular





- Murmur


  ** systolic murmur (1)


Location: left sternal border


Grade: III/VI





- Extremities


Extremities: no ischemia


Extremity abnormal: other (RUE AV fistula)





- Abdominal


General gastrointestinal: soft, non-tender, non-distended





- Psychiatric


Psychiatric: appropriate mood/affect





- Neurologic


Neurologic: moves all extremities





- Allied Health


Allied health notes reviewed: nursing





HEART Score





- HEART Score


EKG: Non-specific


Age: 45-65


Risk factors: 1-2 risk factors


Troponin: 


                                        











Troponin T  0.092 ng/mL (0.00-0.029)  H  09/13/21  04:41    











Troponin: > 3x normal limit





- Critical Actions


Critical Actions: 4-6 pts:12-16.6% risk of adverse cardiac event. Should be 

admitted





Results





- Labs


CBC & Chem 7: 


                                 09/14/21 05:26





                                 09/14/21 05:26


Labs: 


                             Laboratory Last Values











WBC  6.6 K/mm3 (4.5-11.0)   09/14/21  05:26    


 


RBC  2.70 M/mm3 (3.65-5.03)  L  09/14/21  05:26    


 


Hgb  8.3 gm/dl (11.8-15.2)  L  09/14/21  05:26    


 


Hct  24.9 % (35.5-45.6)  L  09/14/21  05:26    


 


MCV  92 fl (84-94)   09/14/21  05:26    


 


MCH  31 pg (28-32)   09/14/21  05:26    


 


MCHC  33 % (32-34)   09/14/21  05:26    


 


RDW  20.3 % (13.2-15.2)  H  09/14/21  05:26    


 


Plt Count  218 K/mm3 (140-440)   09/14/21  05:26    


 


Lymph % (Auto)  11.8 % (13.4-35.0)  L  09/14/21  05:26    


 


Mono % (Auto)  10.0 % (0.0-7.3)  H  09/14/21  05:26    


 


Eos % (Auto)  3.4 % (0.0-4.3)   09/14/21  05:26    


 


Baso % (Auto)  0.5 % (0.0-1.8)   09/14/21  05:26    


 


Lymph # (Auto)  0.8 K/mm3 (1.2-5.4)  L  09/14/21  05:26    


 


Mono # (Auto)  0.7 K/mm3 (0.0-0.8)   09/14/21  05:26    


 


Eos # (Auto)  0.2 K/mm3 (0.0-0.4)   09/14/21  05:26    


 


Baso # (Auto)  0.0 K/mm3 (0.0-0.1)   09/14/21  05:26    


 


Seg Neutrophils %  74.3 % (40.0-70.0)  H  09/14/21  05:26    


 


Seg Neutrophils #  4.9 K/mm3 (1.8-7.7)   09/14/21  05:26    


 


Sodium  136 mmol/L (137-145)  L  09/14/21  05:26    


 


Potassium  3.5 mmol/L (3.6-5.0)  L  09/14/21  05:26    


 


Chloride  94.7 mmol/L ()  L  09/14/21  05:26    


 


Carbon Dioxide  30 mmol/L (22-30)   09/14/21  05:26    


 


Anion Gap  15 mmol/L  09/14/21  05:26    


 


BUN  44 mg/dL (9-20)  H  09/14/21  05:26    


 


Creatinine  6.5 mg/dL (0.8-1.3)  H  09/14/21  05:26    


 


Estimated GFR  11 ml/min  09/14/21  05:26    


 


BUN/Creatinine Ratio  7 %  09/14/21  05:26    


 


Glucose  97 mg/dL ()   09/14/21  05:26    


 


Calcium  8.7 mg/dL (8.4-10.2)   09/14/21  05:26    


 


Total Bilirubin  0.80 mg/dL (0.1-1.2)   09/14/21  05:26    


 


AST  20 units/L (5-40)   09/14/21  05:26    


 


ALT  18 units/L (7-56)   09/14/21  05:26    


 


Alkaline Phosphatase  272 units/L ()  H  09/14/21  05:26    


 


Troponin T  0.092 ng/mL (0.00-0.029)  H  09/13/21  04:41    


 


NT-Pro-B Natriuret Pep  > 07299 pg/mL (0-900)  H  09/13/21  08:41    


 


Total Protein  6.9 g/dL (6.3-8.2)   09/14/21  05:26    


 


Albumin  3.7 g/dL (3.9-5)  L  09/14/21  05:26    


 


Albumin/Globulin Ratio  1.2 %  09/14/21  05:26    


 


Triglycerides  39 mg/dL (2-149)   09/13/21  02:31    


 


Cholesterol  116 mg/dL ()   09/13/21  02:31    


 


LDL Cholesterol Direct  66 mg/dL ()   09/13/21  02:31    


 


HDL Cholesterol  54 mg/dL (40-59)   09/13/21  02:31    


 


Cholesterol/HDL Ratio  2.14 %  09/13/21  02:31    


 


Hepatitis A IgM Ab  Non-reactive  (NonReactive)   09/13/21  11:39    


 


Hep Bs Antigen  Nonreactive  (Negative)   09/13/21  11:39    


 


Hep B Core IgM Ab  Non-reactive  (NonReactive)   09/13/21  11:39    


 


Hepatitis C Antibody  Non-reactive  (NonReactive)   09/13/21  11:39    


 


Blood Type  O POSITIVE   09/13/21  06:47    


 


Antibody Screen  Negative   09/13/21  06:47    


 


Crossmatch  See Detail   09/13/21  06:47    














Active Medications





- Current Medications


Current Medications: 














Generic Name Dose Route Start Last Admin





  Trade Name Freq  PRN Reason Stop Dose Admin


 


Acetaminophen  650 mg  09/13/21 17:19 





  Acetaminophen 325 Mg Tab  PO  





  Q4H PRN  





  Pain MILD(1-3)/Fever >100.5/HA  


 


Albuterol  2.5 mg  09/13/21 17:19 





  Albuterol 2.5 Mg/3 Ml Nebu  IH  





  Q4H PRN  





  Shortness Of Breath  


 


Carvedilol  25 mg  09/13/21 10:00  09/14/21 10:58





  Carvedilol 25 Mg Tab  PO   Not Given





  QDAY ARRON  


 


Cinacalcet  30 mg  09/13/21 10:00  09/14/21 10:59





  Cinacalcet 30 Mg Tab  PO   Not Given





  QDAY ARRON  


 


Clonidine HCl  0.1 mg  09/13/21 10:00  09/14/21 10:58





  Clonidine 0.1 Mg Tab  PO   Not Given





  BID ARRON  


 


Docusate Sodium  100 mg  09/13/21 22:00  09/14/21 10:58





  Docusate Sodium 100 Mg Cap  PO   Not Given





  BID ARRON  


 


Furosemide  40 mg  09/13/21 10:00  09/14/21 06:02





  Furosemide 40 Mg/4 Ml Inj  IV   40 mg





  0600,1800 ARRON   Administration


 


Heparin Sodium (Porcine)  5,000 unit  09/13/21 22:00  09/14/21 10:58





  Heparin 5,000 Unit/1 Ml Vial  SUB-Q   Not Given





  Q12HR ARRON  


 


Hydralazine HCl  100 mg  09/13/21 14:00  09/14/21 07:32





  Hydralazine 100 Mg Tab  PO   100 mg





  TID ARRON   Administration


 


Sodium Chloride  100 mls @ 999 mls/hr  09/14/21 10:00 





  Nacl 0.9%  IV  





  QUITA PRN  





  Hypotension  


 


Labetalol HCl  10 mg  09/13/21 09:00  09/14/21 11:08





  Labetalol 20 Mg/4 Ml Inj  IV   10 mg





  Q4H PRN   Administration





  sbp > 160, hold for HR < 70  


 


Morphine Sulfate  2 mg  09/13/21 17:19 





  Morphine 2 Mg/1 Ml Inj  IV  





  Q4H PRN  





  Pain, Moderate (4-6)  


 


Nifedipine  90 mg  09/13/21 10:00  09/14/21 10:58





  Nifedipine Xl 90 Mg Tab  PO   Not Given





  QDAY ARRON  


 


Ondansetron HCl  4 mg  09/13/21 17:19 





  Ondansetron 4 Mg/2 Ml Inj  IV  





  Q8H PRN  





  Nausea And Vomiting  


 


Oxycodone/Acetaminophen  1 tab  09/13/21 17:19  09/14/21 06:02





  Oxycodone /Acetaminophen 5-325mg Tab  PO   1 tab





  Q6H PRN   Administration





  Pain, Moderate (4-6)  


 


Pantoprazole Sodium  40 mg  09/13/21 10:00  09/14/21 07:02





  Pantoprazole 40 Mg Tab  PO   40 mg





  QDAC ARRON   Administration


 


Sodium Chloride  10 ml  09/13/21 22:00  09/14/21 10:59





  Sodium Chloride 0.9% 10 Ml Flush Syringe  IV   Not Given





  BID ARRON  


 


Sodium Chloride  10 ml  09/13/21 17:19 





  Sodium Chloride 0.9% 10 Ml Flush Syringe  IV  





  PRN PRN  





  LINE FLUSH

## 2021-09-14 NOTE — PROGRESS NOTE
Assessment and Plan





Impression:





* End stage renal disease


* Accelerated hypertension


* Fluid overload


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


* Secondary hyperparathyroidism


* Medical noncompliance - patient's last complete dialysis treatment prior to 

  admission was on Aug 27th.  Patient also missed dialysis on Sept 6th - as 

  such, he was appx 3 kg over his DW upon completion of treatment on Sept 10th 





Plan:


* Patient is s/p HD yesterday


* Hemodialysis again today - UF as tolerated


* Continue MWF schedule


* Transfuse pRBC prn - note order


* Epogen with dialysis as needed


* Renal diet





Subjective


Date of service: 09/14/21


Interval history: 





Patient has no complaints.  Remains on supplemental oxygen





Objective





- Vital Signs


Vital signs: 


                               Vital Signs - 12hr











  09/13/21 09/13/21 09/13/21





  21:46 22:00 22:16


 


Pulse Rate 85 88 86


 


Respiratory 14 24 22





Rate   


 


Blood Pressure 162/84 151/82 151/82


 


O2 Sat by Pulse 94 93 92





Oximetry   














  09/13/21 09/13/21 09/13/21





  22:30 22:46 23:00


 


Pulse Rate 91 H 92 H 94 H


 


Respiratory 19 16 23





Rate   


 


Blood Pressure 151/82 151/82 129/63


 


O2 Sat by Pulse 91 91 92





Oximetry   














  09/13/21 09/13/21 09/13/21





  23:16 23:30 23:35


 


Pulse Rate 82 85 84


 


Respiratory 16 16 15





Rate   


 


Blood Pressure 129/63 129/63 129/63


 


O2 Sat by Pulse 96 97 97





Oximetry   














  09/13/21 09/14/21 09/14/21





  23:46 00:00 00:16


 


Pulse Rate 87 89 89


 


Respiratory 16 14 16





Rate   


 


Blood Pressure 129/63 160/86 160/86


 


O2 Sat by Pulse 99 93 92





Oximetry   














  09/14/21 09/14/21 09/14/21





  00:30 00:46 01:00


 


Pulse Rate 89 89 87


 


Respiratory 16 17 25 H





Rate   


 


Blood Pressure 160/86 160/86 158/86


 


O2 Sat by Pulse 93 92 92





Oximetry   














  09/14/21 09/14/21 09/14/21





  01:16 01:30 01:46


 


Pulse Rate 85 91 H 93 H


 


Respiratory 16 25 H 21





Rate   


 


Blood Pressure 158/86 158/86 158/86


 


O2 Sat by Pulse 95 93 88





Oximetry   














  09/14/21 09/14/21 09/14/21





  02:00 02:16 02:30


 


Pulse Rate 82 84 87


 


Respiratory 17 13 12





Rate   


 


Blood Pressure 162/82 162/82 158/86


 


O2 Sat by Pulse 97 97 99





Oximetry   














  09/14/21 09/14/21 09/14/21





  02:46 03:00 03:16


 


Pulse Rate 90 85 85


 


Respiratory 14 11 L 13





Rate   


 


Blood Pressure 158/86 170/82 170/82


 


O2 Sat by Pulse 96 96 95





Oximetry   














  09/14/21 09/14/21 09/14/21





  03:30 03:46 04:00


 


Pulse Rate 84 85 85


 


Respiratory 14 15 13





Rate   


 


Blood Pressure 170/82 170/82 170/82


 


O2 Sat by Pulse 93 93 92





Oximetry   














  09/14/21 09/14/21 09/14/21





  04:16 04:30 04:46


 


Pulse Rate 83 82 85


 


Respiratory 10 L 15 13





Rate   


 


Blood Pressure 170/82 170/82 170/82


 


O2 Sat by Pulse 92 95 96





Oximetry   














  09/14/21 09/14/21 09/14/21





  05:00 05:16 05:30


 


Pulse Rate 86 92 H 89


 


Respiratory 11 L 20 16





Rate   


 


Blood Pressure 149/80 149/80 149/80


 


O2 Sat by Pulse 89 87 94





Oximetry   














  09/14/21 09/14/21 09/14/21





  05:46 06:00 06:16


 


Pulse Rate 84 79 81


 


Respiratory 19 15 12





Rate   


 


Blood Pressure 149/80 166/97 166/97


 


O2 Sat by Pulse 99 98 97





Oximetry   














  09/14/21 09/14/21 09/14/21





  06:30 06:45 07:00


 


Pulse Rate 81 82 81


 


Respiratory 10 L 11 L 11 L





Rate   


 


Blood Pressure 166/97 166/97 166/97


 


O2 Sat by Pulse 99 100 95





Oximetry   














  09/14/21 09/14/21 09/14/21





  07:16 07:30 07:46


 


Pulse Rate 82 82 82


 


Respiratory 13 13 11 L





Rate   


 


Blood Pressure 166/97 169/94 169/94


 


O2 Sat by Pulse 94 95 96





Oximetry   














- General Appearance


General appearance: well-developed, well-nourished


EENT: ATNC


Respiratory: Present: Clear to Ascultation


Cardiology: regular, S1S2


Gastrointestinal: normal, no tenderness, no distended


Integumentary: warm and dry


Psychiatric: cooperative





- Lab





                                 09/14/21 05:26





                                 09/15/21 07:42


                             Most recent lab results











Calcium  8.7 mg/dL (8.4-10.2)   09/14/21  05:26    














Medications & Allergies





- Medications


Allergies/Adverse Reactions: 


                                    Allergies





No Known Allergies Allergy (Verified 08/18/21 10:37)


   








Home Medications: 


                                Home Medications











 Medication  Instructions  Recorded  Confirmed  Last Taken  Type


 


Epoetin Amadeo 20,000 Unit [Procrit] 20,000 unit SUB-Q Sa  vial 03/20/20  Unknown 

Rx


 


Epoetin Amadeo 20,000 Unit [Procrit] 20,000 unit SUB-Q Th  vial 03/20/20  Unknown 

Rx


 


Epoetin Amadeo 20,000 Unit [Procrit] 20,000 unit SUB-Q Tu  vial 03/20/20  Unknown 

Rx


 


Cinacalcet [Sensipar] 30 mg PO QDAY #30 08/19/21  Unknown Rx


 


Furosemide [Lasix TAB] 80 mg PO QDAY #30 08/19/21  Unknown Rx


 


NIFEdipine [Nifedipine ER] 90 mg PO QDAY #30 08/19/21  Unknown Rx


 


Pantoprazole [Protonix TAB] 40 mg PO QDAY #30 08/19/21  Unknown Rx


 


carvediloL [Coreg] 25 mg PO QDAY #30 08/19/21  Unknown Rx


 


cloNIDine [Catapres] 0.1 mg PO BID #60 tablet 08/19/21  Unknown Rx


 


hydrALAZINE [Apresoline TAB] 100 mg PO TID #90 tab 08/19/21  Unknown Rx











Active Medications: 














Generic Name Dose Route Start Last Admin





  Trade Name Freq  PRN Reason Stop Dose Admin


 


Acetaminophen  650 mg  09/13/21 17:19 





  Acetaminophen 325 Mg Tab  PO  





  Q4H PRN  





  Pain MILD(1-3)/Fever >100.5/HA  


 


Albuterol  2.5 mg  09/13/21 17:19 





  Albuterol 2.5 Mg/3 Ml Nebu  IH  





  Q4H PRN  





  Shortness Of Breath  


 


Carvedilol  25 mg  09/13/21 10:00  09/13/21 10:11





  Carvedilol 25 Mg Tab  PO   25 mg





  QDAY ARRON   Administration


 


Cinacalcet  30 mg  09/13/21 10:00  09/13/21 10:10





  Cinacalcet 30 Mg Tab  PO   30 mg





  QDAY ARRON   Administration


 


Clonidine HCl  0.1 mg  09/13/21 10:00  09/13/21 21:35





  Clonidine 0.1 Mg Tab  PO   0.1 mg





  BID ARRON   Administration


 


Docusate Sodium  100 mg  09/13/21 22:00  09/13/21 21:35





  Docusate Sodium 100 Mg Cap  PO   100 mg





  BID ARRON   Administration


 


Furosemide  40 mg  09/13/21 10:00  09/14/21 06:02





  Furosemide 40 Mg/4 Ml Inj  IV   40 mg





  0600,1800 ARRON   Administration


 


Heparin Sodium (Porcine)  5,000 unit  09/13/21 22:00  09/13/21 21:35





  Heparin 5,000 Unit/1 Ml Vial  SUB-Q   5,000 unit





  Q12HR ARRON   Administration


 


Hydralazine HCl  100 mg  09/13/21 14:00  09/14/21 07:32





  Hydralazine 100 Mg Tab  PO   100 mg





  TID ARRON   Administration


 


Sodium Chloride  100 mls @ 999 mls/hr  09/13/21 10:00 





  Nacl 0.9%  IV  





  QUITA PRN  





  Hypotension  


 


Labetalol HCl  10 mg  09/13/21 09:00 





  Labetalol 20 Mg/4 Ml Inj  IV  





  Q4H PRN  





  sbp > 160, hold for HR < 70  


 


Morphine Sulfate  2 mg  09/13/21 17:19 





  Morphine 2 Mg/1 Ml Inj  IV  





  Q4H PRN  





  Pain, Moderate (4-6)  


 


Nifedipine  90 mg  09/13/21 10:00  09/13/21 10:09





  Nifedipine Xl 90 Mg Tab  PO   90 mg





  QDAY ARRON   Administration


 


Ondansetron HCl  4 mg  09/13/21 17:19 





  Ondansetron 4 Mg/2 Ml Inj  IV  





  Q8H PRN  





  Nausea And Vomiting  


 


Oxycodone/Acetaminophen  1 tab  09/13/21 17:19  09/14/21 06:02





  Oxycodone /Acetaminophen 5-325mg Tab  PO   1 tab





  Q6H PRN   Administration





  Pain, Moderate (4-6)  


 


Pantoprazole Sodium  40 mg  09/13/21 10:00  09/14/21 07:02





  Pantoprazole 40 Mg Tab  PO   40 mg





  QDAC ARRON   Administration


 


Sodium Chloride  10 ml  09/13/21 22:00  09/13/21 21:35





  Sodium Chloride 0.9% 10 Ml Flush Syringe  IV   10 ml





  BID ARRON   Administration


 


Sodium Chloride  10 ml  09/13/21 17:19 





  Sodium Chloride 0.9% 10 Ml Flush Syringe  IV  





  PRN PRN  





  LINE FLUSH

## 2021-09-15 LAB
BUN SERPL-MCNC: 61 MG/DL (ref 9–20)
BUN/CREAT SERPL: 7 %
CALCIUM SERPL-MCNC: 9.3 MG/DL (ref 8.4–10.2)
HEMOLYSIS INDEX: 0

## 2021-09-15 PROCEDURE — 5A1D70Z PERFORMANCE OF URINARY FILTRATION, INTERMITTENT, LESS THAN 6 HOURS PER DAY: ICD-10-PCS | Performed by: INTERNAL MEDICINE

## 2021-09-15 RX ADMIN — NIFEDIPINE SCH MG: 90 TABLET, EXTENDED RELEASE ORAL at 15:24

## 2021-09-15 RX ADMIN — Medication SCH ML: at 21:24

## 2021-09-15 RX ADMIN — PANTOPRAZOLE SODIUM SCH: 40 TABLET, DELAYED RELEASE ORAL at 08:00

## 2021-09-15 RX ADMIN — FUROSEMIDE SCH MG: 10 INJECTION, SOLUTION INTRAVENOUS at 18:12

## 2021-09-15 RX ADMIN — DOCUSATE SODIUM SCH: 100 CAPSULE, LIQUID FILLED ORAL at 10:00

## 2021-09-15 RX ADMIN — Medication SCH: at 10:26

## 2021-09-15 RX ADMIN — HEPARIN SODIUM SCH: 5000 INJECTION, SOLUTION INTRAVENOUS; SUBCUTANEOUS at 10:00

## 2021-09-15 RX ADMIN — FUROSEMIDE SCH MG: 10 INJECTION, SOLUTION INTRAVENOUS at 07:37

## 2021-09-15 RX ADMIN — OXYCODONE AND ACETAMINOPHEN PRN TAB: 5; 325 TABLET ORAL at 21:23

## 2021-09-15 RX ADMIN — HYDRALAZINE HYDROCHLORIDE SCH: 100 TABLET, FILM COATED ORAL at 08:00

## 2021-09-15 RX ADMIN — CINACALCET HYDROCHLORIDE SCH MG: 30 TABLET, FILM COATED ORAL at 15:24

## 2021-09-15 RX ADMIN — HYDRALAZINE HYDROCHLORIDE SCH MG: 100 TABLET, FILM COATED ORAL at 21:23

## 2021-09-15 RX ADMIN — DOCUSATE SODIUM SCH MG: 100 CAPSULE, LIQUID FILLED ORAL at 21:24

## 2021-09-15 RX ADMIN — HYDRALAZINE HYDROCHLORIDE SCH MG: 100 TABLET, FILM COATED ORAL at 15:23

## 2021-09-15 RX ADMIN — HEPARIN SODIUM SCH UNIT: 5000 INJECTION, SOLUTION INTRAVENOUS; SUBCUTANEOUS at 21:23

## 2021-09-15 NOTE — PROGRESS NOTE
Assessment and Plan





Impression:





* End stage renal disease


* Accelerated hypertension


* Fluid overload


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


* Secondary hyperparathyroidism


* Medical noncompliance - patient's last complete dialysis treatment prior to 

  admission was on Aug 27th.  Patient also missed dialysis on Sept 6th - as 

  such, he was appx 3 kg over his DW upon completion of treatment on Sept 10th 





Plan:


* Patient is s/p Monday and Tuesday


* Hemodialysis today - maintain MWF schedule


* Transfuse pRBC prn


* Epogen with dialysis as needed


* Renal diet





Subjective


Date of service: 09/15/21


Interval history: 





Patient reports that he is feeling better. He is now off supplement oxygen. Seen

during HD





Objective





- Vital Signs


Vital signs: 


                               Vital Signs - 12hr











  09/14/21 09/14/21 09/14/21





  22:00 23:09 23:21


 


Temperature   97.8 F


 


Pulse Rate   84


 


Respiratory  20 16





Rate   


 


Blood Pressure   173/77


 


O2 Sat by Pulse 94  92





Oximetry   














  09/15/21 09/15/21





  03:08 08:34


 


Temperature 98.1 F 


 


Pulse Rate 80 80


 


Respiratory 16 





Rate  


 


Blood Pressure 159/77 


 


O2 Sat by Pulse 94 100





Oximetry  














- General Appearance


General appearance: well-developed, well-nourished


EENT: ATNC


Neck: no JVD


Respiratory: Present: Clear to Ascultation


Cardiology: regular, S1S2


Gastrointestinal: normal, no tenderness, no distended


Neurologic: alert and oriented x3





- Lab





                                 09/14/21 05:26





                                 09/15/21 07:42


                             Most recent lab results











Calcium  9.3 mg/dL (8.4-10.2)   09/15/21  07:42    














Medications & Allergies





- Medications


Allergies/Adverse Reactions: 


                                    Allergies





No Known Allergies Allergy (Verified 08/18/21 10:37)


   








Home Medications: 


                                Home Medications











 Medication  Instructions  Recorded  Confirmed  Last Taken  Type


 


Epoetin Amadeo 20,000 Unit [Procrit] 20,000 unit SUB-Q Sa  vial 03/20/20 09/15/21 

Unknown Rx


 


Epoetin Amadeo 20,000 Unit [Procrit] 20,000 unit SUB-Q Th  vial 03/20/20 09/15/21 

Unknown Rx


 


Epoetin Amadeo 20,000 Unit [Procrit] 20,000 unit SUB-Q Tu  vial 03/20/20 09/15/21 

Unknown Rx


 


Cinacalcet [Sensipar] 30 mg PO QDAY #30 08/19/21 09/15/21 Unknown Rx


 


Furosemide [Lasix TAB] 80 mg PO QDAY #30 08/19/21 09/15/21 Unknown Rx


 


NIFEdipine [Nifedipine ER] 90 mg PO QDAY #30 08/19/21 09/15/21 Unknown Rx


 


Pantoprazole [Protonix TAB] 40 mg PO QDAY #30 08/19/21 09/15/21 Unknown Rx


 


carvediloL [Coreg] 25 mg PO QDAY #30 08/19/21 09/15/21 Unknown Rx


 


cloNIDine [Catapres] 0.1 mg PO BID #60 tablet 08/19/21 09/15/21 Unknown Rx


 


hydrALAZINE [Apresoline TAB] 100 mg PO TID #90 tab 08/19/21 09/15/21 Unknown Rx











Active Medications: 














Generic Name Dose Route Start Last Admin





  Trade Name Freq  PRN Reason Stop Dose Admin


 


Acetaminophen  650 mg  09/13/21 17:19 





  Acetaminophen 325 Mg Tab  PO  





  Q4H PRN  





  Pain MILD(1-3)/Fever >100.5/HA  


 


Albuterol  2.5 mg  09/13/21 17:19 





  Albuterol 2.5 Mg/3 Ml Nebu  IH  





  Q4H PRN  





  Shortness Of Breath  


 


Carvedilol  25 mg  09/13/21 10:00  09/14/21 10:58





  Carvedilol 25 Mg Tab  PO   Not Given





  QDAY ARRON  


 


Cinacalcet  30 mg  09/13/21 10:00  09/14/21 10:59





  Cinacalcet 30 Mg Tab  PO   Not Given





  QDAY ARRON  


 


Clonidine HCl  0.1 mg  09/13/21 10:00  09/14/21 23:07





  Clonidine 0.1 Mg Tab  PO   0.1 mg





  BID ARRON   Administration


 


Docusate Sodium  100 mg  09/13/21 22:00  09/14/21 23:07





  Docusate Sodium 100 Mg Cap  PO   100 mg





  BID ARRON   Administration


 


Furosemide  40 mg  09/13/21 10:00  09/15/21 07:37





  Furosemide 40 Mg/4 Ml Inj  IV   40 mg





  0600,1800 ARRON   Administration


 


Heparin Sodium (Porcine)  5,000 unit  09/13/21 22:00  09/14/21 23:08





  Heparin 5,000 Unit/1 Ml Vial  SUB-Q   5,000 unit





  Q12HR ARRON   Administration


 


Hydralazine HCl  100 mg  09/13/21 14:00  09/14/21 23:11





  Hydralazine 100 Mg Tab  PO   100 mg





  TID ARRON   Administration


 


Sodium Chloride  100 mls @ 999 mls/hr  09/14/21 10:00 





  Nacl 0.9%  IV  





  QUITA PRN  





  Hypotension  


 


Labetalol HCl  10 mg  09/13/21 09:00  09/14/21 11:08





  Labetalol 20 Mg/4 Ml Inj  IV   10 mg





  Q4H PRN   Administration





  sbp > 160, hold for HR < 70  


 


Morphine Sulfate  2 mg  09/13/21 17:19 





  Morphine 2 Mg/1 Ml Inj  IV  





  Q4H PRN  





  Pain, Moderate (4-6)  


 


Nifedipine  90 mg  09/13/21 10:00  09/14/21 10:58





  Nifedipine Xl 90 Mg Tab  PO   Not Given





  QDAY ARRON  


 


Ondansetron HCl  4 mg  09/13/21 17:19 





  Ondansetron 4 Mg/2 Ml Inj  IV  





  Q8H PRN  





  Nausea And Vomiting  


 


Oxycodone/Acetaminophen  1 tab  09/13/21 17:19  09/14/21 23:09





  Oxycodone /Acetaminophen 5-325mg Tab  PO   1 tab





  Q6H PRN   Administration





  Pain, Moderate (4-6)  


 


Pantoprazole Sodium  40 mg  09/13/21 10:00  09/14/21 07:02





  Pantoprazole 40 Mg Tab  PO   40 mg





  QDAC ARRON   Administration


 


Sodium Chloride  10 ml  09/13/21 22:00  09/14/21 23:08





  Sodium Chloride 0.9% 10 Ml Flush Syringe  IV   10 ml





  BID ARRON   Administration


 


Sodium Chloride  10 ml  09/13/21 17:19 





  Sodium Chloride 0.9% 10 Ml Flush Syringe  IV  





  PRN PRN  





  LINE FLUSH

## 2021-09-15 NOTE — PROGRESS NOTE
Assessment and Plan


Assessment and plan: 





Mr. Barker is a 51-year-old male history of ESRD on HD, CHF, atrial 

fibrillation, and anemia of chronic disease who presented with shortness of 

breath and lower extremity edema after missing multiple dialysis appointments.  

Currently stable after 2 HD sessions.





#Volume overload


-Likely secondary to missed dialysis sessions, improving


-EKGs negative for acute ST changes, troponin also negative


-BNP greater than 70,000


-TTE 9/14: LVEF 45 to 50% with RVSP suggestive of severe pulmonary hypertension





#ESRD on HD, MWF


-HD 9/13, 9/14 and today


-Nephrology following, appreciate assistance


-will resume MWF HD schedule at discharge





#Hypertensive emergency with history of hypertension


-Continue home antihypertensives





#Anemia of chronic kidney disease


-status post 1 unit PRBC


-transfuse to goal hemoglobin greater than 7


-continue Epogen with HD





#Paroxysmal A. fib


-NSR this admission


-Continue telemetry





#Gastroparesis


-As needed Zofran, sucralfate with meals





#History of noncompliance


-patient admits to not taking medications and missing hemodialysis sessions


-Currently experiencing issues with transportation, will discuss with social 

work


Disposition Plan: Home 


Total Time Spent with Patient (Minutes): 30 minutes





History


Interval history: 


No acute events overnight.  Patient currently stable on room air.  Seen during 

dialysis.  Patient reports feeling much improved from admission.





Hospitalist Physical





- Physical exam


Narrative exam: 





GENERAL: Thin male, lying on stretcher.   


CHEST/LUNGS: CTAB on room air


HEART/CARDIOVASCULAR: RRR. No murmur, rubs or gallops appreciated.


ABDOMEN: +BS. NT/ND.


NEURO:  No focal motor deficit.  Follows all commands. Moves all extremities 

equally.


EXTREMITIES: RUE AVF. No cyanosis, cubbing or edema.


PSYCH: Cooperative.





- Constitutional


Vitals: 


                                        











Temp Pulse Resp BP Pulse Ox


 


 97.6 F   89   18   167/84   91 


 


 09/15/21 17:26  09/15/21 17:26  09/15/21 17:26  09/15/21 17:26  09/15/21 17:26











General appearance: Present: no acute distress





HEART Score





- HEART Score


EKG: Non-specific


Age: 45-65


Risk factors: 1-2 risk factors


Troponin: 


                                        











Troponin T  0.092 ng/mL (0.00-0.029)  H  09/13/21  04:41    











Troponin: > 3x normal limit





- Critical Actions


Critical Actions: 4-6 pts:12-16.6% risk of adverse cardiac event. Should be 

admitted





Results





- Labs


CBC & Chem 7: 


                                 09/14/21 05:26





                                 09/15/21 07:42


Labs: 


                             Laboratory Last Values











WBC  6.6 K/mm3 (4.5-11.0)   09/14/21  05:26    


 


RBC  2.70 M/mm3 (3.65-5.03)  L  09/14/21  05:26    


 


Hgb  8.3 gm/dl (11.8-15.2)  L  09/14/21  05:26    


 


Hct  24.9 % (35.5-45.6)  L  09/14/21  05:26    


 


MCV  92 fl (84-94)   09/14/21  05:26    


 


MCH  31 pg (28-32)   09/14/21  05:26    


 


MCHC  33 % (32-34)   09/14/21  05:26    


 


RDW  20.3 % (13.2-15.2)  H  09/14/21  05:26    


 


Plt Count  218 K/mm3 (140-440)   09/14/21  05:26    


 


Lymph % (Auto)  11.8 % (13.4-35.0)  L  09/14/21  05:26    


 


Mono % (Auto)  10.0 % (0.0-7.3)  H  09/14/21  05:26    


 


Eos % (Auto)  3.4 % (0.0-4.3)   09/14/21  05:26    


 


Baso % (Auto)  0.5 % (0.0-1.8)   09/14/21  05:26    


 


Lymph # (Auto)  0.8 K/mm3 (1.2-5.4)  L  09/14/21  05:26    


 


Mono # (Auto)  0.7 K/mm3 (0.0-0.8)   09/14/21  05:26    


 


Eos # (Auto)  0.2 K/mm3 (0.0-0.4)   09/14/21  05:26    


 


Baso # (Auto)  0.0 K/mm3 (0.0-0.1)   09/14/21  05:26    


 


Seg Neutrophils %  74.3 % (40.0-70.0)  H  09/14/21  05:26    


 


Seg Neutrophils #  4.9 K/mm3 (1.8-7.7)   09/14/21  05:26    


 


Sodium  135 mmol/L (137-145)  L  09/15/21  07:42    


 


Potassium  3.7 mmol/L (3.6-5.0)   09/15/21  07:42    


 


Chloride  94.2 mmol/L ()  L  09/15/21  07:42    


 


Carbon Dioxide  26 mmol/L (22-30)   09/15/21  07:42    


 


Anion Gap  19 mmol/L  09/15/21  07:42    


 


BUN  61 mg/dL (9-20)  H  09/15/21  07:42    


 


Creatinine  8.2 mg/dL (0.8-1.3)  H  09/15/21  07:42    


 


Estimated GFR  8 ml/min  09/15/21  07:42    


 


BUN/Creatinine Ratio  7 %  09/15/21  07:42    


 


Glucose  96 mg/dL ()   09/15/21  07:42    


 


Calcium  9.3 mg/dL (8.4-10.2)   09/15/21  07:42    


 


Total Bilirubin  0.80 mg/dL (0.1-1.2)   09/14/21  05:26    


 


AST  20 units/L (5-40)   09/14/21  05:26    


 


ALT  18 units/L (7-56)   09/14/21  05:26    


 


Alkaline Phosphatase  272 units/L ()  H  09/14/21  05:26    


 


Troponin T  0.092 ng/mL (0.00-0.029)  H  09/13/21  04:41    


 


NT-Pro-B Natriuret Pep  > 75404 pg/mL (0-900)  H  09/13/21  08:41    


 


Total Protein  6.9 g/dL (6.3-8.2)   09/14/21  05:26    


 


Albumin  3.7 g/dL (3.9-5)  L  09/14/21  05:26    


 


Albumin/Globulin Ratio  1.2 %  09/14/21  05:26    


 


Triglycerides  39 mg/dL (2-149)   09/13/21  02:31    


 


Cholesterol  116 mg/dL ()   09/13/21  02:31    


 


LDL Cholesterol Direct  66 mg/dL ()   09/13/21  02:31    


 


HDL Cholesterol  54 mg/dL (40-59)   09/13/21  02:31    


 


Cholesterol/HDL Ratio  2.14 %  09/13/21  02:31    


 


Hepatitis A IgM Ab  Non-reactive  (NonReactive)   09/13/21  11:39    


 


Hep Bs Antigen  Nonreactive  (Negative)   09/13/21  11:39    


 


Hep B Core IgM Ab  Non-reactive  (NonReactive)   09/13/21  11:39    


 


Hepatitis C Antibody  Non-reactive  (NonReactive)   09/13/21  11:39    


 


Blood Type  O POSITIVE   09/13/21  06:47    


 


Antibody Screen  Negative   09/13/21  06:47    


 


Crossmatch  See Detail   09/13/21  06:47    














Active Medications





- Current Medications


Current Medications: 














Generic Name Dose Route Start Last Admin





  Trade Name Freq  PRN Reason Stop Dose Admin


 


Acetaminophen  650 mg  09/13/21 17:19 





  Acetaminophen 325 Mg Tab  PO  





  Q4H PRN  





  Pain MILD(1-3)/Fever >100.5/HA  


 


Albuterol  2.5 mg  09/13/21 17:19 





  Albuterol 2.5 Mg/3 Ml Nebu  IH  





  Q4H PRN  





  Shortness Of Breath  


 


Carvedilol  25 mg  09/13/21 10:00  09/15/21 15:24





  Carvedilol 25 Mg Tab  PO   25 mg





  QDAY ARRON   Administration


 


Cinacalcet  30 mg  09/13/21 10:00  09/15/21 15:24





  Cinacalcet 30 Mg Tab  PO   30 mg





  QDAY ARRON   Administration


 


Clonidine HCl  0.1 mg  09/13/21 10:00  09/15/21 15:24





  Clonidine 0.1 Mg Tab  PO   0.1 mg





  BID ARRON   Administration


 


Docusate Sodium  100 mg  09/13/21 22:00  09/15/21 10:00





  Docusate Sodium 100 Mg Cap  PO   Not Given





  BID ARRON  


 


Furosemide  40 mg  09/13/21 10:00  09/15/21 18:12





  Furosemide 40 Mg/4 Ml Inj  IV   40 mg





  0600,1800 ARRON   Administration


 


Heparin Sodium (Porcine)  5,000 unit  09/13/21 22:00  09/15/21 10:00





  Heparin 5,000 Unit/1 Ml Vial  SUB-Q   Not Given





  Q12HR ARRON  


 


Hydralazine HCl  100 mg  09/13/21 14:00  09/15/21 15:23





  Hydralazine 100 Mg Tab  PO   100 mg





  TID ARRON   Administration


 


Sodium Chloride  100 mls @ 999 mls/hr  09/14/21 10:00 





  Nacl 0.9%  IV  





  QUITA PRN  





  Hypotension  


 


Labetalol HCl  10 mg  09/13/21 09:00  09/15/21 12:05





  Labetalol 20 Mg/4 Ml Inj  IV   10 mg





  Q4H PRN   Administration





  sbp > 160, hold for HR < 70  


 


Morphine Sulfate  2 mg  09/13/21 17:19 





  Morphine 2 Mg/1 Ml Inj  IV  





  Q4H PRN  





  Pain, Moderate (4-6)  


 


Nifedipine  90 mg  09/13/21 10:00  09/15/21 15:24





  Nifedipine Xl 90 Mg Tab  PO   90 mg





  QDAY ARRON   Administration


 


Ondansetron HCl  4 mg  09/13/21 17:19 





  Ondansetron 4 Mg/2 Ml Inj  IV  





  Q8H PRN  





  Nausea And Vomiting  


 


Oxycodone/Acetaminophen  1 tab  09/13/21 17:19  09/14/21 23:09





  Oxycodone /Acetaminophen 5-325mg Tab  PO   1 tab





  Q6H PRN   Administration





  Pain, Moderate (4-6)  


 


Pantoprazole Sodium  40 mg  09/13/21 10:00  09/15/21 08:00





  Pantoprazole 40 Mg Tab  PO   Not Given





  QDAC ARRON  


 


Sodium Chloride  10 ml  09/13/21 22:00  09/15/21 10:26





  Sodium Chloride 0.9% 10 Ml Flush Syringe  IV   Not Given





  BID ARRON  


 


Sodium Chloride  10 ml  09/13/21 17:19 





  Sodium Chloride 0.9% 10 Ml Flush Syringe  IV  





  PRN PRN  





  LINE FLUSH

## 2021-09-16 VITALS — DIASTOLIC BLOOD PRESSURE: 82 MMHG | SYSTOLIC BLOOD PRESSURE: 140 MMHG

## 2021-09-16 RX ADMIN — FUROSEMIDE SCH MG: 10 INJECTION, SOLUTION INTRAVENOUS at 05:40

## 2021-09-16 RX ADMIN — CINACALCET HYDROCHLORIDE SCH MG: 30 TABLET, FILM COATED ORAL at 09:13

## 2021-09-16 RX ADMIN — DOCUSATE SODIUM SCH MG: 100 CAPSULE, LIQUID FILLED ORAL at 09:13

## 2021-09-16 RX ADMIN — OXYCODONE AND ACETAMINOPHEN PRN TAB: 5; 325 TABLET ORAL at 08:16

## 2021-09-16 RX ADMIN — PANTOPRAZOLE SODIUM SCH MG: 40 TABLET, DELAYED RELEASE ORAL at 08:16

## 2021-09-16 RX ADMIN — Medication SCH: at 09:13

## 2021-09-16 RX ADMIN — HYDRALAZINE HYDROCHLORIDE SCH MG: 100 TABLET, FILM COATED ORAL at 08:17

## 2021-09-16 RX ADMIN — NIFEDIPINE SCH MG: 90 TABLET, EXTENDED RELEASE ORAL at 09:13

## 2021-09-16 RX ADMIN — HEPARIN SODIUM SCH UNIT: 5000 INJECTION, SOLUTION INTRAVENOUS; SUBCUTANEOUS at 09:13

## 2021-09-16 NOTE — PROGRESS NOTE
Assessment and Plan





Impression:





* End stage renal disease


* Accelerated hypertension


* Fluid overload


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


* Secondary hyperparathyroidism


* Medical noncompliance - patient's last complete dialysis treatment prior to 

  admission was on Aug 27th.  Patient also missed dialysis on Sept 6th - as 

  such, he was appx 3 kg over his DW upon completion of treatment on Sept 10th 





Plan:


* Patient is s/p daily HD x 3 days.  No acute indication for HD


* Will plan for HD tomorrow


* Continue MWF schedule


* UF as tolerated


* Continue antiHTN medications


* Transfuse pRBC prn


* Epogen with dialysis as needed


* Renal diet


* Stable for discharge from a renal standpoint.  SW available at outpatient 

  dialysis clinic.  She has previously attempted to assist patient with 

  transportation.  Application for "[employer] was initiated by JAY but 

  patient did not follow through.  She will restart [employer] application 





Subjective


Date of service: 09/16/21


Interval history: 





Patient has no complaints





Objective





- Vital Signs


Vital signs: 


                               Vital Signs - 12hr











  09/15/21 09/15/21 09/16/21





  21:23 23:43 00:00


 


Temperature  98.6 F 


 


Pulse Rate  76 84


 


Respiratory 20 18 





Rate   


 


Blood Pressure  160/81 


 


O2 Sat by Pulse  91 





Oximetry   














  09/16/21 09/16/21 09/16/21





  03:37 07:38 08:00


 


Temperature 98.6 F 97.9 F 


 


Pulse Rate 85 91 H 


 


Respiratory 18 18 





Rate   


 


Blood Pressure 154/79 140/82 


 


O2 Sat by Pulse 93 90 94





Oximetry   














- General Appearance


General appearance: well-developed, well-nourished


EENT: ATNC


Respiratory: Present: Clear to Ascultation


Cardiology: regular, S1S2


Gastrointestinal: normal, no tenderness, no distended


Integumentary: warm and dry


Neurologic: alert and oriented x3


Psychiatric: cooperative





- Lab





                                 09/14/21 05:26





                                 09/15/21 07:42


                             Most recent lab results











Calcium  9.3 mg/dL (8.4-10.2)   09/15/21  07:42    














Medications & Allergies





- Medications


Allergies/Adverse Reactions: 


                                    Allergies





No Known Allergies Allergy (Verified 08/18/21 10:37)


   








Home Medications: 


                                Home Medications











 Medication  Instructions  Recorded  Confirmed  Last Taken  Type


 


Epoetin Amadeo 20,000 Unit [Procrit] 20,000 unit SUB-Q Sa  vial 03/20/20 09/15/21 

Unknown Rx


 


Epoetin Amadeo 20,000 Unit [Procrit] 20,000 unit SUB-Q Th  vial 03/20/20 09/15/21 

Unknown Rx


 


Epoetin Amadeo 20,000 Unit [Procrit] 20,000 unit SUB-Q Tu  vial 03/20/20 09/15/21 

Unknown Rx


 


Cinacalcet [Sensipar] 30 mg PO QDAY #30 08/19/21 09/15/21 Unknown Rx


 


Furosemide [Lasix TAB] 80 mg PO QDAY #30 08/19/21 09/15/21 Unknown Rx


 


NIFEdipine [Nifedipine ER] 90 mg PO QDAY #30 08/19/21 09/15/21 Unknown Rx


 


Pantoprazole [Protonix TAB] 40 mg PO QDAY #30 08/19/21 09/15/21 Unknown Rx


 


carvediloL [Coreg] 25 mg PO QDAY #30 08/19/21 09/15/21 Unknown Rx


 


cloNIDine [Catapres] 0.1 mg PO BID #60 tablet 08/19/21 09/15/21 Unknown Rx


 


hydrALAZINE [Apresoline TAB] 100 mg PO TID #90 tab 08/19/21 09/15/21 Unknown Rx











Active Medications: 














Generic Name Dose Route Start Last Admin





  Trade Name Freq  PRN Reason Stop Dose Admin


 


Acetaminophen  650 mg  09/13/21 17:19 





  Acetaminophen 325 Mg Tab  PO  





  Q4H PRN  





  Pain MILD(1-3)/Fever >100.5/HA  


 


Albuterol  2.5 mg  09/13/21 17:19 





  Albuterol 2.5 Mg/3 Ml Nebu  IH  





  Q4H PRN  





  Shortness Of Breath  


 


Carvedilol  25 mg  09/13/21 10:00  09/16/21 09:13





  Carvedilol 25 Mg Tab  PO   25 mg





  QDAY ARRON   Administration


 


Cinacalcet  30 mg  09/13/21 10:00  09/16/21 09:13





  Cinacalcet 30 Mg Tab  PO   30 mg





  QDAY ARRON   Administration


 


Clonidine HCl  0.1 mg  09/13/21 10:00  09/16/21 09:13





  Clonidine 0.1 Mg Tab  PO   0.1 mg





  BID ARRON   Administration


 


Docusate Sodium  100 mg  09/13/21 22:00  09/16/21 09:13





  Docusate Sodium 100 Mg Cap  PO   100 mg





  BID ARRON   Administration


 


Furosemide  40 mg  09/13/21 10:00  09/16/21 05:40





  Furosemide 40 Mg/4 Ml Inj  IV   40 mg





  0600,1800 ARRON   Administration


 


Heparin Sodium (Porcine)  5,000 unit  09/13/21 22:00  09/16/21 09:13





  Heparin 5,000 Unit/1 Ml Vial  SUB-Q   5,000 unit





  Q12HR ARRON   Administration


 


Hydralazine HCl  100 mg  09/13/21 14:00  09/16/21 08:17





  Hydralazine 100 Mg Tab  PO   100 mg





  TID ARRON   Administration


 


Sodium Chloride  100 mls @ 999 mls/hr  09/14/21 10:00 





  Nacl 0.9%  IV  





  QUITA PRN  





  Hypotension  


 


Labetalol HCl  10 mg  09/13/21 09:00  09/15/21 12:05





  Labetalol 20 Mg/4 Ml Inj  IV   10 mg





  Q4H PRN   Administration





  sbp > 160, hold for HR < 70  


 


Morphine Sulfate  2 mg  09/13/21 17:19 





  Morphine 2 Mg/1 Ml Inj  IV  





  Q4H PRN  





  Pain, Moderate (4-6)  


 


Nifedipine  90 mg  09/13/21 10:00  09/16/21 09:13





  Nifedipine Xl 90 Mg Tab  PO   90 mg





  QDAY ARRON   Administration


 


Ondansetron HCl  4 mg  09/13/21 17:19 





  Ondansetron 4 Mg/2 Ml Inj  IV  





  Q8H PRN  





  Nausea And Vomiting  


 


Oxycodone/Acetaminophen  1 tab  09/13/21 17:19  09/16/21 08:16





  Oxycodone /Acetaminophen 5-325mg Tab  PO   1 tab





  Q6H PRN   Administration





  Pain, Moderate (4-6)  


 


Pantoprazole Sodium  40 mg  09/13/21 10:00  09/16/21 08:16





  Pantoprazole 40 Mg Tab  PO   40 mg





  QDAC ARRON   Administration


 


Sodium Chloride  10 ml  09/13/21 22:00  09/16/21 09:13





  Sodium Chloride 0.9% 10 Ml Flush Syringe  IV   Not Given





  BID ARRON  


 


Sodium Chloride  10 ml  09/13/21 17:19 





  Sodium Chloride 0.9% 10 Ml Flush Syringe  IV  





  PRN PRN  





  LINE FLUSH

## 2021-09-16 NOTE — DISCHARGE SUMMARY
Providers





- Providers


Date of Admission: 


09/14/21 11:00





Date of discharge: 09/16/21


Attending physician: 


LIZBETH BROWN MD





                                        





09/13/21 07:23


Consult to Physician [CONS] Routine 


   Comment: 


   Consulting Provider: ELISHA STEVE


   Physician Instructions: 


   Reason For Exam: ESRD needing dialysis











Primary care physician: 


POPEYE CARE MD








Hospitalization


Reason for admission: Hypertensive emergency


Condition: Good


Hospital course: 





48-year-old male with history of ESRD on HD, congestive heart failure, 

paroxysmal A. fib who presented with left-sided chest pain and shortness of 

breath.  Reportedly patient is noncompliant with medications and misses dialysis

 frequently.  Was significantly volume overloaded on presentation.  BNP was 

greater than 70,000.  Echocardiogram showed LVEF of 45 to 50%.  Nephrology was 

consulted for hemodialysis.  Hypertensive medications were resumed for 

hypertensive emergency.  He received 1 unit of packed red blood cells for 

anemia.  He was counseled, medications were refilled and he was discharged home.

 


Disposition: 01 HOME / SELF CARE / HOMELESS


Final Discharge Diagnosis (Prints w/discharge instructions): Hypertensive 

emergency.  Chronic systolic heart failure.  ESRD on HD.  Volume overload


Time spent for discharge: 20 minutes





Core Measure Documentation





- Palliative Care


Palliative Care/ Comfort Measures: Not Applicable





- Core Measures


Any of the following diagnoses?: history only





Exam





- Physical Exam


Narrative exam: 





GENERAL: Thin male, lying on stretcher.   


CHEST/LUNGS: CTAB on room air


HEART/CARDIOVASCULAR: RRR. No murmur, rubs or gallops appreciated.


ABDOMEN: +BS. NT/ND.


NEURO:  No focal motor deficit.  Follows all commands. Moves all extremities 

equally.


EXTREMITIES: RUE AVF. No cyanosis, clubbing or edema.


PSYCH: Cooperative.





- Constitutional


Vitals: 


                                        











Temp Pulse Resp BP Pulse Ox


 


 97.9 F   91 H  18   140/82   94 


 


 09/16/21 07:38  09/16/21 07:38  09/16/21 07:38  09/16/21 07:38  09/16/21 08:00














Plan


Care Plan Goals: 


Follow up with Primary Care.


Resume HD on MWF schedule.


Assessment: 


Patient now euvolemic s/p 3 HD sessions. No longer having chest pains or 

shortness of breath. BP better controlled. Will discharge with home medications 

and instructions to report to dialysis tomorrow.


Follow up with: 


PRIMARY CARE,MD [Primary Care Provider] - 7 Days


Prescriptions: 


Furosemide [Lasix TAB] 80 mg PO QDAY #30


Furosemide [Lasix TAB] 80 mg PO QDAY 30 Days #30 tablet

## 2021-10-15 ENCOUNTER — HOSPITAL ENCOUNTER (INPATIENT)
Dept: HOSPITAL 5 - ED | Age: 51
LOS: 6 days | Discharge: HOME | DRG: 252 | End: 2021-10-21
Attending: STUDENT IN AN ORGANIZED HEALTH CARE EDUCATION/TRAINING PROGRAM | Admitting: STUDENT IN AN ORGANIZED HEALTH CARE EDUCATION/TRAINING PROGRAM
Payer: MEDICARE
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
## 2021-10-15 DIAGNOSIS — Z20.822: ICD-10-CM

## 2021-10-15 DIAGNOSIS — D64.89: ICD-10-CM

## 2021-10-15 DIAGNOSIS — I48.0: ICD-10-CM

## 2021-10-15 DIAGNOSIS — N18.6: ICD-10-CM

## 2021-10-15 DIAGNOSIS — I77.0: ICD-10-CM

## 2021-10-15 DIAGNOSIS — R09.02: ICD-10-CM

## 2021-10-15 DIAGNOSIS — T82.898A: Primary | ICD-10-CM

## 2021-10-15 DIAGNOSIS — I12.0: ICD-10-CM

## 2021-10-15 DIAGNOSIS — E83.52: ICD-10-CM
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
## 2021-10-15 DIAGNOSIS — E87.70: ICD-10-CM

## 2021-10-15 DIAGNOSIS — D64.9: ICD-10-CM

## 2021-10-15 LAB
ANISOCYTOSIS BLD QL SMEAR: (no result)
BAND NEUTROPHILS # (MANUAL): 0 K/MM3
BUN SERPL-MCNC: 67 MG/DL (ref 9–20)
BUN/CREAT SERPL: 7 %
CALCIUM SERPL-MCNC: 10.5 MG/DL (ref 8.4–10.2)
HCT VFR BLD CALC: 20.9 % (ref 35.5–45.6)
HEMOLYSIS INDEX: 0
HGB BLD-MCNC: 6.9 GM/DL (ref 11.8–15.2)
MCHC RBC AUTO-ENTMCNC: 33 % (ref 32–34)
MCV RBC AUTO: 97 FL (ref 84–94)
MYELOCYTES # (MANUAL): 0 K/MM3
PLATELET # BLD: 192 K/MM3 (ref 140–440)
POIKILOCYTOSIS BLD QL SMEAR: (no result)
PROMYELOCYTES # (MANUAL): 0 K/MM3
RBC # BLD AUTO: 2.16 M/MM3 (ref 3.65–5.03)
SPHEROCYTES BLD QL SMEAR: (no result)
TARGETS BLD QL SMEAR: (no result)
TOTAL CELLS COUNTED BLD: 100

## 2021-10-15 PROCEDURE — 85007 BL SMEAR W/DIFF WBC COUNT: CPT

## 2021-10-15 PROCEDURE — 80074 ACUTE HEPATITIS PANEL: CPT

## 2021-10-15 PROCEDURE — 85027 COMPLETE CBC AUTOMATED: CPT

## 2021-10-15 PROCEDURE — 87641 MR-STAPH DNA AMP PROBE: CPT

## 2021-10-15 PROCEDURE — 86920 COMPATIBILITY TEST SPIN: CPT

## 2021-10-15 PROCEDURE — C1768 GRAFT, VASCULAR: HCPCS

## 2021-10-15 PROCEDURE — 83970 ASSAY OF PARATHORMONE: CPT

## 2021-10-15 PROCEDURE — C1750 CATH, HEMODIALYSIS,LONG-TERM: HCPCS

## 2021-10-15 PROCEDURE — 88304 TISSUE EXAM BY PATHOLOGIST: CPT

## 2021-10-15 PROCEDURE — U0003 INFECTIOUS AGENT DETECTION BY NUCLEIC ACID (DNA OR RNA); SEVERE ACUTE RESPIRATORY SYNDROME CORONAVIRUS 2 (SARS-COV-2) (CORONAVIRUS DISEASE [COVID-19]), AMPLIFIED PROBE TECHNIQUE, MAKING USE OF HIGH THROUGHPUT TECHNOLOGIES AS DESCRIBED BY CMS-2020-01-R: HCPCS

## 2021-10-15 PROCEDURE — 83735 ASSAY OF MAGNESIUM: CPT

## 2021-10-15 PROCEDURE — C1894 INTRO/SHEATH, NON-LASER: HCPCS

## 2021-10-15 PROCEDURE — 36415 COLL VENOUS BLD VENIPUNCTURE: CPT

## 2021-10-15 PROCEDURE — 86901 BLOOD TYPING SEROLOGIC RH(D): CPT

## 2021-10-15 PROCEDURE — 85025 COMPLETE CBC W/AUTO DIFF WBC: CPT

## 2021-10-15 PROCEDURE — 36907 BALO ANGIOP CTR DIALYSIS SEG: CPT

## 2021-10-15 PROCEDURE — G0378 HOSPITAL OBSERVATION PER HR: HCPCS

## 2021-10-15 PROCEDURE — C1725 CATH, TRANSLUMIN NON-LASER: HCPCS

## 2021-10-15 PROCEDURE — 88311 DECALCIFY TISSUE: CPT

## 2021-10-15 PROCEDURE — 71045 X-RAY EXAM CHEST 1 VIEW: CPT

## 2021-10-15 PROCEDURE — 5A1D70Z PERFORMANCE OF URINARY FILTRATION, INTERMITTENT, LESS THAN 6 HOURS PER DAY: ICD-10-PCS | Performed by: STUDENT IN AN ORGANIZED HEALTH CARE EDUCATION/TRAINING PROGRAM

## 2021-10-15 PROCEDURE — 86900 BLOOD TYPING SEROLOGIC ABO: CPT

## 2021-10-15 PROCEDURE — 86850 RBC ANTIBODY SCREEN: CPT

## 2021-10-15 PROCEDURE — 77001 FLUOROGUIDE FOR VEIN DEVICE: CPT

## 2021-10-15 PROCEDURE — P9016 RBC LEUKOCYTES REDUCED: HCPCS

## 2021-10-15 PROCEDURE — 80048 BASIC METABOLIC PNL TOTAL CA: CPT

## 2021-10-15 PROCEDURE — 84100 ASSAY OF PHOSPHORUS: CPT

## 2021-10-15 PROCEDURE — C1751 CATH, INF, PER/CENT/MIDLINE: HCPCS

## 2021-10-15 PROCEDURE — 36558 INSERT TUNNELED CV CATH: CPT

## 2021-10-15 RX ADMIN — HYDRALAZINE HYDROCHLORIDE SCH MG: 20 INJECTION INTRAMUSCULAR; INTRAVENOUS at 11:45

## 2021-10-15 RX ADMIN — OXYCODONE AND ACETAMINOPHEN PRN TAB: 5; 325 TABLET ORAL at 20:24

## 2021-10-15 RX ADMIN — Medication SCH ML: at 23:13

## 2021-10-15 RX ADMIN — HYDRALAZINE HYDROCHLORIDE SCH MG: 100 TABLET, FILM COATED ORAL at 15:13

## 2021-10-15 RX ADMIN — NIFEDIPINE SCH MG: 90 TABLET, EXTENDED RELEASE ORAL at 15:13

## 2021-10-15 RX ADMIN — HYDRALAZINE HYDROCHLORIDE SCH MG: 20 INJECTION INTRAMUSCULAR; INTRAVENOUS at 15:12

## 2021-10-15 RX ADMIN — MORPHINE SULFATE PRN MG: 4 INJECTION, SOLUTION INTRAMUSCULAR; INTRAVENOUS at 20:25

## 2021-10-15 RX ADMIN — HYDRALAZINE HYDROCHLORIDE SCH MG: 100 TABLET, FILM COATED ORAL at 20:24

## 2021-10-15 NOTE — CONSULTATION
History of Present Illness





- Reason for Consult


Consult date: 10/15/21


end stage renal disease


Requesting physician: JUAN MAZA





- History of Present Illness


51-year-old -American male presents to the emergency department with a 

complaint of not being able to urinate over the past 2 days.  Patient has a 

history of end-stage renal disease on hemodialysis on Monday/Wednesday/Friday, 

but says that he usually urinates small amounts about 4-5 times per day.  He f

eels that the lower abdomen might be slightly distended, but he denies any 

significant pain.  Patient also has history of atrial fibrillation but not on 

anticoagulation.  His nephrologist is Dr. Naranjo.  The patient also tells me 

that he has some recent history of significant anemia.  He says that he had a 

hemoglobin of 6.8 last week in the dialysis clinic and was supposed to be 

transported to this emergency department for a transfusion but transport never 

arrived.  He denies any shortness of breath, lower extremity swelling, fever, 

nausea, vomiting, chest pain.


Patient is currently undergoing dialysis.  Denies any shortness of breath.  His 

blood pressure still elevated.


Patient noted to have some oozing from his AV fistula site








Past History


Past Medical History: atrial fib, dialysis, hypertension


Past Surgical History: Other (History of creation of AV fistula)


Social history: no significant social history


Family history: no significant family history





Medications and Allergies


                                    Allergies











Allergy/AdvReac Type Severity Reaction Status Date / Time


 


No Known Allergies Allergy   Verified 08/18/21 10:37











                                Home Medications











 Medication  Instructions  Recorded  Confirmed  Last Taken  Type


 


Epoetin Amadeo 20,000 Unit [Procrit] 20,000 unit SUB-Q Sa  vial 03/20/20 09/15/21 

Unknown Rx


 


Epoetin Amadeo 20,000 Unit [Procrit] 20,000 unit SUB-Q Th  vial 03/20/20 09/15/21 

Unknown Rx


 


Epoetin Amadeo 20,000 Unit [Procrit] 20,000 unit SUB-Q Tu  vial 03/20/20 09/15/21 

Unknown Rx


 


NIFEdipine [Nifedipine ER] 90 mg PO QDAY #30 08/19/21 09/15/21 Unknown Rx


 


Cinacalcet [Sensipar] 30 mg PO QDAY  tablet 09/16/21  Unknown Rx


 


Epoetin Amadeo-Epbx 10,000 Unit 10,000 unit IV QUITA PRN  vial 09/16/21  Unknown Rx





[Retacrit]     


 


Furosemide [Lasix TAB] 80 mg PO QDAY #30 09/16/21  Unknown Rx


 


Furosemide [Lasix TAB] 80 mg PO QDAY 30 Days #30 tablet 09/16/21  Unknown Rx


 


NIFEdipine XL [Procardia Xl] 90 mg PO QDAY  tablet 09/16/21  Unknown Rx


 


Pantoprazole [Protonix TAB] 40 mg PO QDAC  tablet 09/16/21  Unknown Rx


 


carvediloL [Coreg] 25 mg PO QDAY  tablet 09/16/21  Unknown Rx


 


cloNIDine [Catapres] 0.1 mg PO BID  tablet 09/16/21  Unknown Rx


 


hydrALAZINE [Apresoline TAB] 100 mg PO TID  tab 09/16/21  Unknown Rx











Active Meds: 


Active Medications





Acetaminophen (Acetaminophen 325 Mg Tab)  650 mg PO Q4H PRN


   PRN Reason: Pain MILD(1-3)/Fever >100.5/HA


Carvedilol (Carvedilol 25 Mg Tab)  25 mg PO BID [facility]


Clonidine HCl (Clonidine 0.1 Mg Tab)  0.1 mg PO Q12HR [facility]


   Last Admin: 10/15/21 12:31 Dose:  0.1 mg


   Documented by: 


Hydralazine HCl (Hydralazine 100 Mg Tab)  100 mg PO TID [facility]


Hydralazine HCl (Hydralazine 20 Mg/1 Ml Inj)  20 mg IV ONCE@1300 [facility]


   Stop: 10/15/21 16:00


   Last Admin: 10/15/21 11:45 Dose:  20 mg


   Documented by: 


Morphine Sulfate (Morphine 4 Mg/1 Ml Inj)  4 mg IV Q4H PRN


   PRN Reason: Pain , Severe (7-10)


Naloxone HCl (Naloxone 0.4 Mg/1 Ml Inj)  0.1 mg IV Q2MIN PRN


   PRN Reason: Res Rate </= 8 or 02 SAT < 92%


Nifedipine (Nifedipine Xl 90 Mg Tab)  90 mg PO QDAY [facility]


Ondansetron HCl (Ondansetron 4 Mg/2 Ml Inj)  4 mg IV Q8H PRN


   PRN Reason: Nausea And Vomiting


Oxycodone/Acetaminophen (Oxycodone /Acetaminophen 5-325mg Tab)  1 tab PO Q6H PRN


   PRN Reason: Pain, Moderate (4-6)


Sodium Chloride (Sodium Chloride 0.9% 10 Ml Flush Syringe)  10 ml IV BID ARRON


Sodium Chloride (Sodium Chloride 0.9% 10 Ml Flush Syringe)  10 ml IV PRN PRN


   PRN Reason: LINE FLUSH











Review of Systems


All systems: negative (Negative except as noted above)





Exam





- Vital Signs


Vital signs: 


                                   Vital Signs











Pulse Ox


 


 89 


 


 10/15/21 04:56














- General Appearance


General appearance: well-developed, well-nourished, appears stated age


EENT: PERRL, mucous membranes moist


Neck: Present: neck supple, trachea midline.  Absent: JVD/HJR, Masses


Respiratory: Clear to Ascultation


Heart: regular, normal heart rate


Gastrointestinal: Present: normal, normoactive bowel sounds


Integumentary: no rash, other (No edema.  AV fistula left upper arm.  Cannulated

for dialysis.  Skin overlying his fistula appears to be denuded.)





Results





- Lab Results





                                 10/15/21 06:48





                                 10/15/21 06:48


                             Most recent lab results











Calcium  10.5 mg/dL (8.4-10.2)  H  10/15/21  06:48    














Assessment and Plan


Impression


* End-stage renal disease on maintenance hemodialysis


* Severe anemia


* Uncontrolled hypertension


* AV fistula with compromised overlying skin


* Hypercalcemia





Recommendations


* Patient is currently undergoing hemodialysis.  Tolerating well.  Maintain him 

  on MWF schedule for now


* Transfuse packed RBC as needed.  Anemia work-up as per primary team


* His blood pressure still elevated.  Shall adjust his antihypertensive 

  medication


* Skin overlying his fistula seems to be compromised.  It appears shiny and also

  had small bleeding spots noted.  Shall consult vascular surgery.  [place] 

  [place]


* Use lower calcium bath for dialysis


* Epogen after better blood pressure control


* Binders with meals


* Adjust diet and meds for ESRD state


* No IV, BP or venipuncture in access site


* Thank you very much for the consultation.  Shall follow along with you

## 2021-10-15 NOTE — HISTORY AND PHYSICAL REPORT
History of Present Illness


Date of examination: 10/15/21


Date of admission: 


10/15/2021


Chief complaint: 





Decreased urinary output


History of present illness: 





Patient is a 51-year-old male with a history significant for ESRD on HD MWF, 

hypertension and medication noncompliance who presented after 3 days of no urine

output.  At baseline patient reports being able to make urine.  Starting Monday 

he realized that he no longer was urinating.  He reports having the urge to 

urinate, but dribbling occurs.  He denies dysuria, lower back pain, penile 

discharge.  He was recently admitted and discharged 1 month ago.  Since that 

time he reports not taking any medications.  He reports he was " denied 

prescriptions by [facility] pharmacy".  Patient was interviewed and evaluated 

during dialysis.  Review of systems significant for bilateral leg cramping and 

lower extremity edema.  He has been having increased fluid intake and not taking

Lasix as prescribed.





Past History


Past Medical History: ESRD, hypertension


Social history: no significant social history


Family history: no significant family history





Medications and Allergies


                                    Allergies











Allergy/AdvReac Type Severity Reaction Status Date / Time


 


No Known Allergies Allergy   Verified 08/18/21 10:37











                                Home Medications











 Medication  Instructions  Recorded  Confirmed  Last Taken  Type


 


Epoetin Amadeo 20,000 Unit [Procrit] 20,000 unit SUB-Q Sa  vial 03/20/20 09/15/21 

Unknown Rx


 


Epoetin Amadeo 20,000 Unit [Procrit] 20,000 unit SUB-Q Th  vial 03/20/20 09/15/21 

Unknown Rx


 


Epoetin Amadeo 20,000 Unit [Procrit] 20,000 unit SUB-Q Tu  vial 03/20/20 09/15/21 

Unknown Rx


 


NIFEdipine [Nifedipine ER] 90 mg PO QDAY #30 08/19/21 09/15/21 Unknown Rx


 


Cinacalcet [Sensipar] 30 mg PO QDAY  tablet 09/16/21  Unknown Rx


 


Epoetin Amadeo-Epbx 10,000 Unit 10,000 unit IV QUITA PRN  vial 09/16/21  Unknown Rx





[Retacrit]     


 


Furosemide [Lasix TAB] 80 mg PO QDAY #30 09/16/21  Unknown Rx


 


Furosemide [Lasix TAB] 80 mg PO QDAY 30 Days #30 tablet 09/16/21  Unknown Rx


 


NIFEdipine XL [Procardia Xl] 90 mg PO QDAY  tablet 09/16/21  Unknown Rx


 


Pantoprazole [Protonix TAB] 40 mg PO QDAC  tablet 09/16/21  Unknown Rx


 


carvediloL [Coreg] 25 mg PO QDAY  tablet 09/16/21  Unknown Rx


 


cloNIDine [Catapres] 0.1 mg PO BID  tablet 09/16/21  Unknown Rx


 


hydrALAZINE [Apresoline TAB] 100 mg PO TID  tab 09/16/21  Unknown Rx














Exam





- Physical Exam


Narrative exam: 





GENERAL: Well-developed well-nourished.  Lying in bed.


HEENT: Normocephalic. Atraumatic.  


CHEST/LUNGS: CTAB on 2 L nasal cannula


HEART/CARDIOVASCULAR: RRR. No murmur, rubs or gallops appreciated.


ABDOMEN: +BS. NT/ND.


NEURO:  No focal motor deficit.  Follows all commands and is ambulatory.


MUSCULOSKELETAL: No joint effusion 


EXTREMITIES: LUE AVF. No cyanosis, clubbing or edema.


PSYCH: Cooperative.





- Constitutional


Vitals: 


                                        











Temp Pulse Resp BP Pulse Ox


 


       20   221/99   99 


 


       10/15/21 06:49  10/15/21 08:01  10/15/21 08:01














Results





- Labs


CBC & Chem 7: 


                                 10/15/21 06:48





                                 10/15/21 06:48


Labs: 


                             Laboratory Last Values











WBC  6.3 K/mm3 (4.5-11.0)   10/15/21  06:48    


 


RBC  2.16 M/mm3 (3.65-5.03)  L  10/15/21  06:48    


 


Hgb  6.9 gm/dl (11.8-15.2)  L  10/15/21  06:48    


 


Hct  20.9 % (35.5-45.6)  L  10/15/21  06:48    


 


MCV  97 fl (84-94)  H  10/15/21  06:48    


 


MCH  32 pg (28-32)   10/15/21  06:48    


 


MCHC  33 % (32-34)   10/15/21  06:48    


 


RDW  20.0 % (13.2-15.2)  H  10/15/21  06:48    


 


Plt Count  192 K/mm3 (140-440)   10/15/21  06:48    


 


Baso % (Auto)  Np   10/15/21  06:48    


 


Sodium  139 mmol/L (137-145)   10/15/21  06:48    


 


Potassium  5.0 mmol/L (3.6-5.0)   10/15/21  06:48    


 


Chloride  96.9 mmol/L ()  L  10/15/21  06:48    


 


Carbon Dioxide  25 mmol/L (22-30)   10/15/21  06:48    


 


Anion Gap  22 mmol/L  10/15/21  06:48    


 


BUN  67 mg/dL (9-20)  H  10/15/21  06:48    


 


Creatinine  9.2 mg/dL (0.8-1.3)  H  10/15/21  06:48    


 


Estimated GFR  7 ml/min  10/15/21  06:48    


 


BUN/Creatinine Ratio  7 %  10/15/21  06:48    


 


Glucose  94 mg/dL ()   10/15/21  06:48    


 


Calcium  10.5 mg/dL (8.4-10.2)  H  10/15/21  06:48    














Assessment and Plan


Assessment and plan: 





51-year-old male history of ESRD on HD, anemia of chronic disease and medication

noncompliance who presented with complaint of decreased urinary output x3 days. 

Found to be anemic and high blood calcemic.  Dialyzed while inpatient.  

Currently stable.





#ESRD on HD


-Nephrology consulted, plan for HD today


-decreased urinary output could be consequence of loss of kidney function


-Vascular surgery consulted for defect in skin above AV fistula





#Malignant hypertension


-patient with severely elevated blood pressure during HD sessions


-resume hydralazine, clonidine, coreg, nifedipine at home doses


-Likely secondary to medication noncompliance, will adjust as necessary





#Acute on anemia of chronic disease


-H&H 6.9/20.9, was 8.3/24.9 on 9/14


-Transfuse 1 pack of red cells today


-Maintain hemoglobin greater than 7


-Likely secondary to ESRD


-Epogen with dialysis





#Paroxysmal atrial fibrillation


-NSR, will continue to monitor





#History of medication noncompliance


-patient admits to not taking prescription since last hospital discharge


-counseled about importance of medication compliance


Advance Directives: No


VTE prophylaxis?: Chemical


Plan of care discussed with patient/family: Yes

## 2021-10-15 NOTE — CONSULTATION
History of Present Illness





- Reason for Consult


Consult date: 10/15/21


Complications of Dialysis Access


Requesting physician: JANICE VENEGAS





- History of Present Illness





The patient is a 51-year-old male with history of end-stage renal disease who is

currently on hemodialysis through a transposed brachiobasilic arteriovenous 

fistula.  He states the fistula was created in [place] approximately 5 years 

ago.  Since that time he has not had significant complications with the fistula.

 He has developed 2 pseudoaneurysms that have been present for years however he 

states he has never had any significant bleeding from the areas.  He states he 

was referred to another vascular surgeon but was unhappy with that surgeon and 

decided not to have any intervention at that time.  He has developed thinning of

the skin over both pseudoaneurysms which has become concerning.  He denies any 

prolonged bleeding or difficulty with cannulation of the fistula during 

dialysis.  He has no additional complaints at this time.





Past History


Past Medical History: atrial fib, anemia, ESRD, hypertension


Past Surgical History: Other (History of creation of AV fistula)


Social history: no significant social history


Family history: no significant family history





Medications and Allergies


                                    Allergies











Allergy/AdvReac Type Severity Reaction Status Date / Time


 


No Known Allergies Allergy   Verified 08/18/21 10:37











                                Home Medications











 Medication  Instructions  Recorded  Confirmed  Last Taken  Type


 


Epoetin Amadeo 20,000 Unit [Procrit] 20,000 unit SUB-Q Sa  vial 03/20/20 09/15/21 

Unknown Rx


 


Epoetin Amdaeo 20,000 Unit [Procrit] 20,000 unit SUB-Q Th  vial 03/20/20 09/15/21 

Unknown Rx


 


Epoetin Amadeo 20,000 Unit [Procrit] 20,000 unit SUB-Q Tu  vial 03/20/20 09/15/21 

Unknown Rx


 


NIFEdipine [Nifedipine ER] 90 mg PO QDAY #30 08/19/21 09/15/21 Unknown Rx


 


Cinacalcet [Sensipar] 30 mg PO QDAY  tablet 09/16/21  Unknown Rx


 


Epoetin Amadeo-Epbx 10,000 Unit 10,000 unit IV QUITA PRN  vial 09/16/21  Unknown Rx





[Retacrit]     


 


Furosemide [Lasix TAB] 80 mg PO QDAY #30 09/16/21  Unknown Rx


 


Furosemide [Lasix TAB] 80 mg PO QDAY 30 Days #30 tablet 09/16/21  Unknown Rx


 


NIFEdipine XL [Procardia Xl] 90 mg PO QDAY  tablet 09/16/21  Unknown Rx


 


Pantoprazole [Protonix TAB] 40 mg PO QDAC  tablet 09/16/21  Unknown Rx


 


carvediloL [Coreg] 25 mg PO QDAY  tablet 09/16/21  Unknown Rx


 


cloNIDine [Catapres] 0.1 mg PO BID  tablet 09/16/21  Unknown Rx


 


hydrALAZINE [Apresoline TAB] 100 mg PO TID  tab 09/16/21  Unknown Rx











Active Meds: 


Active Medications





Acetaminophen (Acetaminophen 325 Mg Tab)  650 mg PO Q4H PRN


   PRN Reason: Pain MILD(1-3)/Fever >100.5/HA


Carvedilol (Carvedilol 25 Mg Tab)  25 mg PO BID [facility]


Clonidine HCl (Clonidine 0.1 Mg Tab)  0.1 mg PO Q12HR [facility]


   Last Admin: 10/15/21 12:31 Dose:  0.1 mg


   Documented by: 


Hydralazine HCl (Hydralazine 100 Mg Tab)  100 mg PO TID [facility]


   Last Admin: 10/15/21 15:13 Dose:  100 mg


   Documented by: 


Hydralazine HCl (Hydralazine 20 Mg/1 Ml Inj)  20 mg IV ONCE@1300 [facility]


   Stop: 10/15/21 16:00


   Last Admin: 10/15/21 15:12 Dose:  20 mg


   Documented by: 


Morphine Sulfate (Morphine 4 Mg/1 Ml Inj)  4 mg IV Q4H PRN


   PRN Reason: Pain , Severe (7-10)


Naloxone HCl (Naloxone 0.4 Mg/1 Ml Inj)  0.1 mg IV Q2MIN PRN


   PRN Reason: Res Rate </= 8 or 02 SAT < 92%


Nifedipine (Nifedipine Xl 90 Mg Tab)  90 mg PO QDAY [facility]


   Last Admin: 10/15/21 15:13 Dose:  90 mg


   Documented by: 


Ondansetron HCl (Ondansetron 4 Mg/2 Ml Inj)  4 mg IV Q8H PRN


   PRN Reason: Nausea And Vomiting


Oxycodone/Acetaminophen (Oxycodone /Acetaminophen 5-325mg Tab)  1 tab PO Q6H PRN


   PRN Reason: Pain, Moderate (4-6)


Sodium Chloride (Sodium Chloride 0.9% 10 Ml Flush Syringe)  10 ml IV BID [facility]


Sodium Chloride (Sodium Chloride 0.9% 10 Ml Flush Syringe)  10 ml IV PRN PRN


   PRN Reason: LINE FLUSH











Review of Systems


All systems: negative





Exam





- Constitutional


Vitals: 


                                        











Temp Pulse Resp BP Pulse Ox


 


 98.3 F   89   24   224/111   92 


 


 10/15/21 14:05  10/15/21 15:12  10/15/21 14:05  10/15/21 15:12  10/15/21 14:05











General appearance: Present: no acute distress





- Respiratory


Respiratory effort: normal


Respiratory: bilateral: CTA





- Extremities


Extremities: abnormal (Left arm arteriovenous fistula with palpable thrill, 2 

pseudoaneurysms in the cannulation zone with thin skin and small ulceration 

without significant bleeding.  There are multiple veins in the upper arm and 

shoulder suggesting he may have central venous occlusion or stenosis.)





- Abdominal


General gastrointestinal: Present: soft


Male genitourinary: Present: deferred





- Rectal


Rectal Exam: deferred





Results





- Labs


CBC & Chem 7: 


                                 10/15/21 06:48





                                 10/15/21 06:48


Labs: 


                              Abnormal lab results











  10/15/21 10/15/21 10/15/21 Range/Units





  06:48 06:48 10:30 


 


RBC  2.16 L    (3.65-5.03)  M/mm3


 


Hgb  6.9 L    (11.8-15.2)  gm/dl


 


Hct  20.9 L    (35.5-45.6)  %


 


MCV  97 H    (84-94)  fl


 


RDW  20.0 H    (13.2-15.2)  %


 


Chloride   96.9 L   ()  mmol/L


 


BUN   67 H   (9-20)  mg/dL


 


Creatinine   9.2 H   (0.8-1.3)  mg/dL


 


Calcium   10.5 H   (8.4-10.2)  mg/dL


 


Crossmatch    See Detail  














Assessment and Plan





The patient is a 51-year-old male who presents with a left brachiobasilic 

arteriovenous fistula with 2 moderately sized pseudoaneurysms with ulceration 

and thinning of the skin.  The areas are concerning for the potential for hemorr

sarah if not treated.  I discussed the need for revision of the fistula.  Other 

than the areas of pseudoaneurysm the remainder of the fistula does not have any 

aneurysmal dilatation.  There are multiple veins throughout the upper arm and 

chest suggesting central venous stenosis versus occlusion.  I discussed the plan

for a fistulogram with possible intervention, on Monday, along with placement of

a permacath at that time.  Tuesday he will have revision of his fistula with 

excision of the pseudoaneurysms and an interposition graft.  The permacath will 

remain in place for 2 weeks until the incision is healed and they can resume 

accessing the graft for his dialysis.  The patient has expressed understanding 

and agrees to proceed with the plan.  Given his hemoglobin he will require a 

transfusion prior to proceeding to the operating room.

## 2021-10-15 NOTE — XRAY REPORT
CHEST 1 VIEW 



INDICATION:  SOB.



COMPARISON:  Yesterday



FINDINGS:

Support devices: None. 



Heart: Stable cardiomegaly. 

Lungs/Pleura: Mild improvement in pulmonary venous congestion and small pleural effusions is demonstr
ated. No pneumothorax.



Additional findings: None.



IMPRESSION:

 Mild improvement in CHF.



Signer Name: Sherman Marroquin Jr, MD 

Signed: 10/15/2021 8:33 AM

Workstation Name: [id number]

## 2021-10-15 NOTE — EMERGENCY DEPARTMENT REPORT
HPI





- General


Chief Complaint: Urogenital-Male


Time Seen by Provider: 10/15/21 06:13





- HPI


HPI: 





51-year-old -American male presents to the emergency department with a 

complaint of not being able to urinate over the past 2 days.  Patient has a 

history of end-stage renal disease on hemodialysis on Monday/Wednesday/Friday, 

but says that he usually urinates small amounts about 4-5 times per day.  He 

feels that the lower abdomen might be slightly distended, but he denies any 

significant pain.  Patient also has history of atrial fibrillation but not on 

anticoagulation.  His nephrologist is Dr. Naranjo.  The patient also tells me 

that he has some recent history of significant anemia.  He says that he had a 

hemoglobin of 6.8 last week in the dialysis clinic and was supposed to be 

transported to this emergency department for a transfusion but transport never 

arrived.  He denies any shortness of breath, lower extremity swelling, fever, 

nausea, vomiting, chest pain.





ED Past Medical Hx





- Past Medical History


Hx Hypertension: Yes


Hx Heart Attack/AMI: No


Hx Renal Disease: Yes (HD TThS. Last HD)


Additional medical history: Glaucoma.  afib





- Surgical History


Additional Surgical History: Left A/V Graft





- Social History


Smoking Status: Never Smoker





- Medications


Home Medications: 


                                Home Medications











 Medication  Instructions  Recorded  Confirmed  Last Taken  Type


 


Epoetin Amadeo 20,000 Unit [Procrit] 20,000 unit SUB-Q Sa  vial 03/20/20 09/15/21 

Unknown Rx


 


Epoetin Amadeo 20,000 Unit [Procrit] 20,000 unit SUB-Q Th  vial 03/20/20 09/15/21 

Unknown Rx


 


Epoetin Amadeo 20,000 Unit [Procrit] 20,000 unit SUB-Q Tu  vial 03/20/20 09/15/21 

Unknown Rx


 


NIFEdipine [Nifedipine ER] 90 mg PO QDAY #30 08/19/21 09/15/21 Unknown Rx


 


Cinacalcet [Sensipar] 30 mg PO QDAY  tablet 09/16/21  Unknown Rx


 


Epoetin Amadeo-Epbx 10,000 Unit 10,000 unit IV QUITA PRN  vial 09/16/21  Unknown Rx





[Retacrit]     


 


Furosemide [Lasix TAB] 80 mg PO QDAY #30 09/16/21  Unknown Rx


 


Furosemide [Lasix TAB] 80 mg PO QDAY 30 Days #30 tablet 09/16/21  Unknown Rx


 


NIFEdipine XL [Procardia Xl] 90 mg PO QDAY  tablet 09/16/21  Unknown Rx


 


Pantoprazole [Protonix TAB] 40 mg PO QDAC  tablet 09/16/21  Unknown Rx


 


carvediloL [Coreg] 25 mg PO QDAY  tablet 09/16/21  Unknown Rx


 


cloNIDine [Catapres] 0.1 mg PO BID  tablet 09/16/21  Unknown Rx


 


hydrALAZINE [Apresoline TAB] 100 mg PO TID  tab 09/16/21  Unknown Rx














ED Review of Systems


ROS: 


Stated complaint: UNBALE TO VOID X 3DAYS


Other details as noted in HPI





Comment: All other systems reviewed and negative


Constitutional: denies: chills, fever


Eyes: denies: eye pain, vision change


ENT: denies: ear pain, throat pain


Respiratory: denies: cough, shortness of breath


Cardiovascular: denies: chest pain, palpitations


Gastrointestinal: denies: nausea, vomiting


Genitourinary: other (Decreased urination).  denies: dysuria


Musculoskeletal: denies: back pain, arthralgia


Skin: denies: rash, lesions


Neurological: denies: headache, weakness





Physical Exam





- Physical Exam


Physical Exam: 





GENERAL: The patient is well-developed well-nourished.


HENT: Normocephalic.  Atraumatic.    Patient has moist mucous membranes.


EYES: Extraocular motions are intact.


NECK: Supple. Trachea is midline.


CHEST/LUNGS: Clear to auscultation.  There is no respiratory distress noted.


HEART/CARDIOVASCULAR: Regular.  There is no tachycardia.  There is no murmur.


ABDOMEN: Abdomen is soft, nontender.  Patient has normal bowel sounds. 


SKIN: Skin is warm and dry. 


NEURO: The patient is awake, alert, and oriented.  The patient is cooperative.  

The patient has no focal neurologic deficits.  Normal speech.


MUSCULOSKELETAL: There is no tenderness or deformity.  There is no limitation 

range of motion. 





ED Course





- Consultations


Consultation #1: 





10/15/21 08:21


I spoke to the nephrologist on-call, Dr. Stafford, who will arrange for the 

patient to receive dialysis this morning and consult on the patient.  He agrees 

with the plan for the patient to receive a unit of packed red blood cells for 

transfusion.





ED Medical Decision Making





- Lab Data


Result diagrams: 


                                 10/15/21 06:48





                                 10/15/21 06:48





                                   Lab Results











  10/15/21 10/15/21 Range/Units





  06:48 06:48 


 


WBC  6.3   (4.5-11.0)  K/mm3


 


RBC  2.16 L   (3.65-5.03)  M/mm3


 


Hgb  6.9 L   (11.8-15.2)  gm/dl


 


Hct  20.9 L   (35.5-45.6)  %


 


MCV  97 H   (84-94)  fl


 


MCH  32   (28-32)  pg


 


MCHC  33   (32-34)  %


 


RDW  20.0 H   (13.2-15.2)  %


 


Plt Count  192   (140-440)  K/mm3


 


Baso % (Auto)  Np   


 


Sodium   139  (137-145)  mmol/L


 


Potassium   5.0  (3.6-5.0)  mmol/L


 


Chloride   96.9 L  ()  mmol/L


 


Carbon Dioxide   25  (22-30)  mmol/L


 


Anion Gap   22  mmol/L


 


BUN   67 H  (9-20)  mg/dL


 


Creatinine   9.2 H  (0.8-1.3)  mg/dL


 


Estimated GFR   7  ml/min


 


BUN/Creatinine Ratio   7  %


 


Glucose   94  ()  mg/dL


 


Calcium   10.5 H  (8.4-10.2)  mg/dL














- Radiology Data


Radiology results: image reviewed


interpreted by me: 





Chest x-ray shows some pulmonary vascular congestion and some mild interstitial 

edema.  No widened mediastinum.  No pneumothorax.





- Medical Decision Making





This patient presents to the emergency department initially with a complaint 

that he might have urinary retention as there has been decreased urinary output 

and he had some questionable lower abdominal distention.  There was no 

significant urine seen on the bladder scan and the patient does not complain of 

any lower abdominal or suprapubic discomfort.  I think it is more consistent 

with decreased urinary output given his end-stage renal disease on hemodialysis.





Patient's labs showed hemoglobin of 6.8 that is down a few grams from a month 

ago.  Otherwise labs show renal insufficiency consistent with his end-stage 

renal disease on hemodialysis.





The patient started complaining of shortness of breath and was found to have a 

room air oxygen saturation of 88%.  He was placed on oxygen via nasal cannula 

with some improvement.  Patient presents with hypertensive urgency.  Orders were

given for the patient to receive hydralazine during dialysis as he reached a 

systolic blood pressure about 240.





Nephrology was contacted and consulted.  Patient was ordered a unit of packed 

red blood cells for transfusion.  He was accepted for admission by the 

hospitalist service.


Critical Care Time: Yes


Critical care time in (mins) excluding proc time.: 31


Critical care attestation.: 


If time is entered above; I have spent that time in minutes in the direct care 

of this critically ill patient, excluding procedure time.  Critical care time 

spent on this patient in doing his initial evaluation, multiple reevaluations, 

ordering and interpretation of labs and imaging, discussion with the nephrology 

service, ordering of blood for transfusion, antihypertensives for his 

hypertensive urgency, supplemental oxygen for the hypoxemia.





Critical Care Time: 





31 minutes





ED Disposition


Clinical Impression: 


 ESRD on hemodialysis, Hypertensive urgency, Anemia requiring transfusions





Disposition: 09 ADMITTED AS INPATIENT


Is pt being admited?: Yes


Condition: Fair


Time of Disposition: 08:23

## 2021-10-16 LAB
BASOPHILS # (AUTO): 0 K/MM3 (ref 0–0.1)
BASOPHILS NFR BLD AUTO: 0.6 % (ref 0–1.8)
BUN SERPL-MCNC: 33 MG/DL (ref 9–20)
BUN/CREAT SERPL: 5 %
CALCIUM SERPL-MCNC: 10.2 MG/DL (ref 8.4–10.2)
EOSINOPHIL # BLD AUTO: 0.4 K/MM3 (ref 0–0.4)
EOSINOPHIL NFR BLD AUTO: 4.9 % (ref 0–4.3)
HCT VFR BLD CALC: 26.2 % (ref 35.5–45.6)
HEMOLYSIS INDEX: 2
HGB BLD-MCNC: 8.8 GM/DL (ref 11.8–15.2)
LYMPHOCYTES # BLD AUTO: 1 K/MM3 (ref 1.2–5.4)
LYMPHOCYTES NFR BLD AUTO: 13.4 % (ref 13.4–35)
MCHC RBC AUTO-ENTMCNC: 34 % (ref 32–34)
MCV RBC AUTO: 94 FL (ref 84–94)
MONOCYTES # (AUTO): 0.8 K/MM3 (ref 0–0.8)
MONOCYTES % (AUTO): 11.2 % (ref 0–7.3)
PLATELET # BLD: 217 K/MM3 (ref 140–440)
RBC # BLD AUTO: 2.79 M/MM3 (ref 3.65–5.03)

## 2021-10-16 PROCEDURE — 30233N1 TRANSFUSION OF NONAUTOLOGOUS RED BLOOD CELLS INTO PERIPHERAL VEIN, PERCUTANEOUS APPROACH: ICD-10-PCS | Performed by: STUDENT IN AN ORGANIZED HEALTH CARE EDUCATION/TRAINING PROGRAM

## 2021-10-16 RX ADMIN — HYDRALAZINE HYDROCHLORIDE SCH MG: 100 TABLET, FILM COATED ORAL at 21:58

## 2021-10-16 RX ADMIN — Medication SCH ML: at 12:07

## 2021-10-16 RX ADMIN — HEPARIN SODIUM SCH UNIT: 5000 INJECTION, SOLUTION INTRAVENOUS; SUBCUTANEOUS at 16:44

## 2021-10-16 RX ADMIN — Medication SCH ML: at 21:58

## 2021-10-16 RX ADMIN — HYDRALAZINE HYDROCHLORIDE SCH MG: 100 TABLET, FILM COATED ORAL at 08:58

## 2021-10-16 RX ADMIN — HYDRALAZINE HYDROCHLORIDE SCH MG: 100 TABLET, FILM COATED ORAL at 16:45

## 2021-10-16 RX ADMIN — NIFEDIPINE SCH MG: 90 TABLET, EXTENDED RELEASE ORAL at 12:06

## 2021-10-16 RX ADMIN — HEPARIN SODIUM SCH UNIT: 5000 INJECTION, SOLUTION INTRAVENOUS; SUBCUTANEOUS at 08:59

## 2021-10-16 RX ADMIN — HEPARIN SODIUM SCH UNIT: 5000 INJECTION, SOLUTION INTRAVENOUS; SUBCUTANEOUS at 21:58

## 2021-10-16 RX ADMIN — MORPHINE SULFATE PRN MG: 4 INJECTION, SOLUTION INTRAMUSCULAR; INTRAVENOUS at 02:08

## 2021-10-16 NOTE — PROGRESS NOTE
Assessment and Plan


Impression


* End-stage renal disease on maintenance hemodialysis


* Severe anemia


* Uncontrolled hypertension


* AV fistula with compromised overlying skin


* Hypercalcemia





Recommendations


* Patient had uneventful hemodialysis yesterday.  Continue dialysis on MWF 

  schedule for now


* Patient is status post 1 unit packed RBC transfusion.  Recommend GI 

  evaluation.  


* His blood pressure is better this morning 


* Skin overlying his fistula seems to be compromised.  It appears shiny and also

  had small bleeding spots noted.  Vascular surgery consultation appreciated


* Use lower calcium bath for dialysis.  Check PTH and phosphorus level


* Epogen after better blood pressure control


* Binders with meals


* Adjust diet and meds for ESRD state


* No IV, BP or venipuncture in access site











Subjective


Date of service: 10/16/21


Interval history: 


Patient is comfortable today.  Denies any shortness of breath.  No nausea 

vomiting or diarrhea.








Objective





- Vital Signs


Vital signs: 


                               Vital Signs - 12hr











  10/16/21 10/16/21 10/16/21





  02:00 02:30 02:44


 


Temperature  97.6 F 97.6 F


 


Pulse Rate  88 84


 


Respiratory 20 20 20





Rate   


 


Blood Pressure  163/94 160/90


 


O2 Sat by Pulse 93 95 98





Oximetry   














  10/16/21 10/16/21 10/16/21





  02:45 03:15 04:15


 


Temperature 97.6 F 97.6 F 97.6 F


 


Pulse Rate 84 84 84


 


Respiratory 20 18 18





Rate   


 


Blood Pressure 160/90 162/90 142/70


 


O2 Sat by Pulse 98 99 98





Oximetry   














  10/16/21





  05:21


 


Temperature 97.8 F


 


Pulse Rate 79


 


Respiratory 18





Rate 


 


Blood Pressure 133/72


 


O2 Sat by Pulse 97





Oximetry 














- General Appearance


General appearance: well-developed, well-nourished, appears stated age


EENT: PERRL, mucous membranes moist


Neck: no JVD, no thyromegaly, no carotid bruit, supple


Respiratory: Present: Clear to Ascultation


Cardiology: regular, normal heart rate, S1S2, no murmurs


Gastrointestinal: normal, normoactive bowel sounds


Integumentary: no rash, other (No edema.  AV fistula left upper arm.  Good bruit

and thrill.)





- Lab





                                 10/15/21 06:48





                                 10/15/21 06:48


                             Most recent lab results











Calcium  10.5 mg/dL (8.4-10.2)  H  10/15/21  06:48    














Medications & Allergies





- Medications


Allergies/Adverse Reactions: 


                                    Allergies





No Known Allergies Allergy (Verified 08/18/21 10:37)


   








Home Medications: 


                                Home Medications











 Medication  Instructions  Recorded  Confirmed  Last Taken  Type


 


Epoetin Amadeo 20,000 Unit [Procrit] 20,000 unit SUB-Q Sa  vial 03/20/20 10/15/21 

Unknown Rx


 


Epoetin Amadeo 20,000 Unit [Procrit] 20,000 unit SUB-Q Th  vial 03/20/20 10/15/21 

Unknown Rx


 


Epoetin Amadeo 20,000 Unit [Procrit] 20,000 unit SUB-Q Tu  vial 03/20/20 10/15/21 

Unknown Rx


 


NIFEdipine [Nifedipine ER] 90 mg PO QDAY #30 08/19/21 10/15/21 Unknown Rx


 


Cinacalcet [Sensipar] 30 mg PO QDAY  tablet 09/16/21 10/15/21 Unknown Rx


 


Epoetin Amadeo-Epbx 10,000 Unit 10,000 unit IV QUITA PRN  vial 09/16/21 10/15/21 

Unknown Rx





[Retacrit]     


 


Furosemide [Lasix TAB] 80 mg PO QDAY #30 09/16/21 10/15/21 Unknown Rx


 


Furosemide [Lasix TAB] 80 mg PO QDAY 30 Days #30 tablet 09/16/21 10/15/21 

Unknown Rx


 


NIFEdipine XL [Procardia Xl] 90 mg PO QDAY  tablet 09/16/21 10/15/21 Unknown Rx


 


Pantoprazole [Protonix TAB] 40 mg PO QDAC  tablet 09/16/21 10/15/21 Unknown Rx


 


carvediloL [Coreg] 25 mg PO QDAY  tablet 09/16/21 10/15/21 Unknown Rx


 


cloNIDine [Catapres] 0.1 mg PO BID  tablet 09/16/21 10/15/21 Unknown Rx


 


hydrALAZINE [Apresoline TAB] 100 mg PO TID  tab 09/16/21 10/15/21 Unknown Rx











Active Medications: 














Generic Name Dose Route Start Last Admin





  Trade Name Freq  PRN Reason Stop Dose Admin


 


Acetaminophen  650 mg  10/15/21 11:00 





  Acetaminophen 325 Mg Tab  PO  





  Q4H PRN  





  Pain MILD(1-3)/Fever >100.5/HA  


 


Carvedilol  25 mg  10/15/21 13:00  10/15/21 23:13





  Carvedilol 25 Mg Tab  PO   Not Given





  BID ARRON  


 


Clonidine HCl  0.1 mg  10/15/21 13:00  10/15/21 23:12





  Clonidine 0.1 Mg Tab  PO   0.1 mg





  Q12HR ARRON   Administration


 


Heparin Sodium (Porcine)  5,000 unit  10/16/21 08:00  10/16/21 08:59





  Heparin 5,000 Unit/1 Ml Vial  SUB-Q   5,000 unit





  Q8HR ARRON   Administration


 


Hydralazine HCl  100 mg  10/15/21 14:00  10/16/21 08:58





  Hydralazine 100 Mg Tab  PO   100 mg





  TID ARRON   Administration


 


Morphine Sulfate  4 mg  10/15/21 10:36  10/16/21 02:08





  Morphine 4 Mg/1 Ml Inj  IV   4 mg





  Q4H PRN   Administration





  Pain , Severe (7-10)  


 


Naloxone HCl  0.1 mg  10/15/21 10:36 





  Naloxone 0.4 Mg/1 Ml Inj  IV  





  Q2MIN PRN  





  Res Rate </= 8 or 02 SAT < 92%  


 


Nifedipine  90 mg  10/15/21 13:00  10/15/21 15:13





  Nifedipine Xl 90 Mg Tab  PO   90 mg





  QDAY ARRON   Administration


 


Ondansetron HCl  4 mg  10/15/21 11:00 





  Ondansetron 4 Mg/2 Ml Inj  IV  





  Q8H PRN  





  Nausea And Vomiting  


 


Oxycodone/Acetaminophen  1 tab  10/15/21 10:36  10/15/21 20:24





  Oxycodone /Acetaminophen 5-325mg Tab  PO   1 tab





  Q6H PRN   Administration





  Pain, Moderate (4-6)  


 


Sodium Chloride  10 ml  10/15/21 22:00  10/15/21 23:13





  Sodium Chloride 0.9% 10 Ml Flush Syringe  IV   10 ml





  BID ARRON   Administration


 


Sodium Chloride  10 ml  10/15/21 10:36 





  Sodium Chloride 0.9% 10 Ml Flush Syringe  IV  





  PRN PRN  





  LINE FLUSH

## 2021-10-16 NOTE — PROGRESS NOTE
Assessment and Plan


Assessment and plan: 





51-year-old male history of ESRD on HD, anemia of chronic disease and medication

noncompliance who presented with complaint of decreased urinary output x3 days. 

Found to be anemic and high blood calcemic.  Dialyzed while inpatient.  

Currently stable.





#ESRD on HD


-Nephrology consulted, HD MWF


-decreased urinary output could be consequence of loss of kidney function





#AV fistula defect


-Vascular surgery following, plan for procedure during the week





#Hypertension


#Malignant hypertension


-patient with severely elevated blood pressure during HD sessions


-BP controlled with restarting meds


-continue hydralazine, clonidine, coreg, nifedipine at home doses


-Likely secondary to medication noncompliance, will adjust as necessary





#Acute on anemia of chronic disease


-H&H 6.9/20.9 -> 8.8/26.2


-Transfused 1 pack pRBCs 10/15


-Maintain hemoglobin greater than 7


-Likely secondary to ESRD


-Epogen with dialysis





#Paroxysmal atrial fibrillation


-NSR, will continue to monitor





#History of medication noncompliance


-patient admits to not taking prescription since last hospital discharge


-counseled about importance of medication compliance


Disposition Plan: Home


Total Time Spent with Patient (Minutes): 20 minutes





History


Interval history: 





No acute events overnight. Patient no longer having cramping in his legs or 

shortness of breath. No complaints at this time. 





Hospitalist Physical





- Physical exam


Narrative exam: 





GENERAL: Well-developed well-nourished.  Lying in bed.


HEENT: Normocephalic. Atraumatic.  


CHEST/LUNGS: CTAB on 2 L nasal cannula


HEART/CARDIOVASCULAR: RRR. No murmur, rubs or gallops appreciated.


ABDOMEN: +BS. NT/ND.


NEURO:  No focal motor deficit.  Follows all commands and is ambulatory.


MUSCULOSKELETAL: No joint effusion 


EXTREMITIES: LUE AVF. No cyanosis, clubbing or edema.


PSYCH: Cooperative.





- Constitutional


Vitals: 


                                        











Temp Pulse Resp BP Pulse Ox


 


 97.8 F   79   18   133/72   97 


 


 10/16/21 05:21  10/16/21 05:21  10/16/21 05:21  10/16/21 05:21  10/16/21 05:21











General appearance: Present: no acute distress





Results





- Labs


CBC & Chem 7: 


                                 10/16/21 10:41





                                 10/15/21 06:48


Labs: 


                             Laboratory Last Values











WBC  6.3 K/mm3 (4.5-11.0)   10/15/21  06:48    


 


RBC  2.16 M/mm3 (3.65-5.03)  L  10/15/21  06:48    


 


Hgb  6.9 gm/dl (11.8-15.2)  L  10/15/21  06:48    


 


Hct  20.9 % (35.5-45.6)  L  10/15/21  06:48    


 


MCV  97 fl (84-94)  H  10/15/21  06:48    


 


MCH  32 pg (28-32)   10/15/21  06:48    


 


MCHC  33 % (32-34)   10/15/21  06:48    


 


RDW  20.0 % (13.2-15.2)  H  10/15/21  06:48    


 


Plt Count  192 K/mm3 (140-440)   10/15/21  06:48    


 


Baso % (Auto)  Np   10/15/21  06:48    


 


Add Manual Diff  Complete   10/15/21  06:48    


 


Total Counted  100   10/15/21  06:48    


 


Seg Neuts % (Manual)  83.0 % (40.0-70.0)  H  10/15/21  06:48    


 


Lymphocytes % (Manual)  9.0 % (13.4-35.0)  L  10/15/21  06:48    


 


Monocytes % (Manual)  1.0 % (0.0-7.3)   10/15/21  06:48    


 


Eosinophils % (Manual)  7.0 % (0.0-4.3)  H  10/15/21  06:48    


 


Nucleated RBC %  Not Reportable   10/15/21  06:48    


 


Seg Neutrophils # Man  5.2 K/mm3 (1.8-7.7)   10/15/21  06:48    


 


Band Neutrophils #  0.0 K/mm3  10/15/21  06:48    


 


Lymphocytes # (Manual)  0.6 K/mm3 (1.2-5.4)  L  10/15/21  06:48    


 


Abs React Lymphs (Man)  0.0 K/mm3  10/15/21  06:48    


 


Monocytes # (Manual)  0.1 K/mm3 (0.0-0.8)   10/15/21  06:48    


 


Eosinophils # (Manual)  0.4 K/mm3 (0.0-0.4)   10/15/21  06:48    


 


Basophils # (Manual)  0.0 K/mm3 (0.0-0.1)   10/15/21  06:48    


 


Metamyelocytes #  0.0 K/mm3  10/15/21  06:48    


 


Myelocytes #  0.0 K/mm3  10/15/21  06:48    


 


Promyelocytes #  0.0 K/mm3  10/15/21  06:48    


 


Blast Cells #  0.0 K/mm3  10/15/21  06:48    


 


WBC Morphology  Not Reportable   10/15/21  06:48    


 


Hypersegmented Neuts  Not Reportable   10/15/21  06:48    


 


Hyposegmented Neuts  Not Reportable   10/15/21  06:48    


 


Hypogranular Neuts  Not Reportable   10/15/21  06:48    


 


Smudge Cells  Not Reportable   10/15/21  06:48    


 


Toxic Granulation  Not Reportable   10/15/21  06:48    


 


Toxic Vacuolation  Not Reportable   10/15/21  06:48    


 


Dohle Bodies  Not Reportable   10/15/21  06:48    


 


Pelger-Huet Anomaly  Not Reportable   10/15/21  06:48    


 


Nel Rods  Not Reportable   10/15/21  06:48    


 


Platelet Estimate  Consistent w auto   10/15/21  06:48    


 


Clumped Platelets  Not Reportable   10/15/21  06:48    


 


Plt Clumps, EDTA  Not Reportable   10/15/21  06:48    


 


Large Platelets  Not Reportable   10/15/21  06:48    


 


Giant Platelets  Not Reportable   10/15/21  06:48    


 


Platelet Satelliting  Not Reportable   10/15/21  06:48    


 


Plt Morphology Comment  Not Reportable   10/15/21  06:48    


 


RBC Morphology  Not Reportable   10/15/21  06:48    


 


Dimorphic RBCs  Not Reportable   10/15/21  06:48    


 


Polychromasia  Not Reportable   10/15/21  06:48    


 


Hypochromasia  Not Reportable   10/15/21  06:48    


 


Poikilocytosis  1+   10/15/21  06:48    


 


Anisocytosis  1+   10/15/21  06:48    


 


Microcytosis  Not Reportable   10/15/21  06:48    


 


Macrocytosis  Not Reportable   10/15/21  06:48    


 


Spherocytes  1+   10/15/21  06:48    


 


Pappenheimer Bodies  Not Reportable   10/15/21  06:48    


 


Sickle Cells  Not Reportable   10/15/21  06:48    


 


Target Cells  1+   10/15/21  06:48    


 


Tear Drop Cells  Not Reportable   10/15/21  06:48    


 


Ovalocytes  Not Reportable   10/15/21  06:48    


 


Helmet Cells  Not Reportable   10/15/21  06:48    


 


Meredith-Marlboro Bodies  Not Reportable   10/15/21  06:48    


 


Cabot Rings  Not Reportable   10/15/21  06:48    


 


[place] Cells  Not Reportable   10/15/21  06:48    


 


Bite Cells  Not Reportable   10/15/21  06:48    


 


Crenated Cell  Not Reportable   10/15/21  06:48    


 


Elliptocytes  Not Reportable   10/15/21  06:48    


 


Acanthocytes (Spur)  Not Reportable   10/15/21  06:48    


 


Rouleaux  Not Reportable   10/15/21  06:48    


 


Hemoglobin C Crystals  Not Reportable   10/15/21  06:48    


 


Schistocytes  Not Reportable   10/15/21  06:48    


 


Malaria parasites  Not Reportable   10/15/21  06:48    


 


Behzad Bodies  Not Reportable   10/15/21  06:48    


 


Hem Pathologist Commnt  No   10/15/21  06:48    


 


Sodium  139 mmol/L (137-145)   10/15/21  06:48    


 


Potassium  5.0 mmol/L (3.6-5.0)   10/15/21  06:48    


 


Chloride  96.9 mmol/L ()  L  10/15/21  06:48    


 


Carbon Dioxide  25 mmol/L (22-30)   10/15/21  06:48    


 


Anion Gap  22 mmol/L  10/15/21  06:48    


 


BUN  67 mg/dL (9-20)  H  10/15/21  06:48    


 


Creatinine  9.2 mg/dL (0.8-1.3)  H  10/15/21  06:48    


 


Estimated GFR  7 ml/min  10/15/21  06:48    


 


BUN/Creatinine Ratio  7 %  10/15/21  06:48    


 


Glucose  94 mg/dL ()   10/15/21  06:48    


 


Calcium  10.5 mg/dL (8.4-10.2)  H  10/15/21  06:48    


 


Blood Type  O POSITIVE   10/15/21  10:30    


 


Antibody Screen  Negative   10/15/21  10:30    


 


Crossmatch  See Detail   10/15/21  10:30    











Mcmahon/IV: 


                                        





Voiding Method                   Toilet











Active Medications





- Current Medications


Current Medications: 














Generic Name Dose Route Start Last Admin





  Trade Name Freq  PRN Reason Stop Dose Admin


 


Acetaminophen  650 mg  10/15/21 11:00 





  Acetaminophen 325 Mg Tab  PO  





  Q4H PRN  





  Pain MILD(1-3)/Fever >100.5/HA  


 


Carvedilol  25 mg  10/15/21 13:00  10/15/21 23:13





  Carvedilol 25 Mg Tab  PO   Not Given





  BID ARRON  


 


Clonidine HCl  0.1 mg  10/15/21 13:00  10/15/21 23:12





  Clonidine 0.1 Mg Tab  PO   0.1 mg





  Q12HR ARRON   Administration


 


Hydralazine HCl  100 mg  10/15/21 14:00  10/15/21 20:24





  Hydralazine 100 Mg Tab  PO   100 mg





  TID ARRON   Administration


 


Morphine Sulfate  4 mg  10/15/21 10:36  10/16/21 02:08





  Morphine 4 Mg/1 Ml Inj  IV   4 mg





  Q4H PRN   Administration





  Pain , Severe (7-10)  


 


Naloxone HCl  0.1 mg  10/15/21 10:36 





  Naloxone 0.4 Mg/1 Ml Inj  IV  





  Q2MIN PRN  





  Res Rate </= 8 or 02 SAT < 92%  


 


Nifedipine  90 mg  10/15/21 13:00  10/15/21 15:13





  Nifedipine Xl 90 Mg Tab  PO   90 mg





  QDAY ARRON   Administration


 


Ondansetron HCl  4 mg  10/15/21 11:00 





  Ondansetron 4 Mg/2 Ml Inj  IV  





  Q8H PRN  





  Nausea And Vomiting  


 


Oxycodone/Acetaminophen  1 tab  10/15/21 10:36  10/15/21 20:24





  Oxycodone /Acetaminophen 5-325mg Tab  PO   1 tab





  Q6H PRN   Administration





  Pain, Moderate (4-6)  


 


Sodium Chloride  10 ml  10/15/21 22:00  10/15/21 23:13





  Sodium Chloride 0.9% 10 Ml Flush Syringe  IV   10 ml





  BID ARRON   Administration


 


Sodium Chloride  10 ml  10/15/21 10:36 





  Sodium Chloride 0.9% 10 Ml Flush Syringe  IV  





  PRN PRN  





  LINE FLUSH

## 2021-10-16 NOTE — EVENT NOTE
Date: 10/16/21





Plan for cathlab Monday for left arm fistulagram and permacath placement.  Then 

revision of left arm fistula in OR on Tuesday.

## 2021-10-17 LAB
ANISOCYTOSIS BLD QL SMEAR: (no result)
BAND NEUTROPHILS # (MANUAL): 0 K/MM3
BUN SERPL-MCNC: 44 MG/DL (ref 9–20)
BUN/CREAT SERPL: 6 %
BURR CELLS BLD QL SMEAR: (no result)
CALCIUM SERPL-MCNC: 9.7 MG/DL (ref 8.4–10.2)
HCT VFR BLD CALC: 24.9 % (ref 35.5–45.6)
HEMOLYSIS INDEX: 1
HGB BLD-MCNC: 8.7 GM/DL (ref 11.8–15.2)
MCHC RBC AUTO-ENTMCNC: 35 % (ref 32–34)
MCV RBC AUTO: 93 FL (ref 84–94)
MYELOCYTES # (MANUAL): 0 K/MM3
PLATELET # BLD: 213 K/MM3 (ref 140–440)
POIKILOCYTOSIS BLD QL SMEAR: (no result)
PROMYELOCYTES # (MANUAL): 0 K/MM3
RBC # BLD AUTO: 2.66 M/MM3 (ref 3.65–5.03)
TOTAL CELLS COUNTED BLD: 100

## 2021-10-17 RX ADMIN — HYDRALAZINE HYDROCHLORIDE SCH MG: 100 TABLET, FILM COATED ORAL at 09:33

## 2021-10-17 RX ADMIN — HYDRALAZINE HYDROCHLORIDE SCH: 100 TABLET, FILM COATED ORAL at 23:01

## 2021-10-17 RX ADMIN — NIFEDIPINE SCH MG: 90 TABLET, EXTENDED RELEASE ORAL at 09:33

## 2021-10-17 RX ADMIN — HYDRALAZINE HYDROCHLORIDE SCH MG: 100 TABLET, FILM COATED ORAL at 17:51

## 2021-10-17 RX ADMIN — Medication SCH ML: at 09:35

## 2021-10-17 RX ADMIN — HEPARIN SODIUM SCH UNIT: 5000 INJECTION, SOLUTION INTRAVENOUS; SUBCUTANEOUS at 17:51

## 2021-10-17 RX ADMIN — Medication SCH ML: at 23:01

## 2021-10-17 RX ADMIN — HEPARIN SODIUM SCH UNIT: 5000 INJECTION, SOLUTION INTRAVENOUS; SUBCUTANEOUS at 23:00

## 2021-10-17 RX ADMIN — HEPARIN SODIUM SCH UNIT: 5000 INJECTION, SOLUTION INTRAVENOUS; SUBCUTANEOUS at 07:08

## 2021-10-17 NOTE — PROGRESS NOTE
Assessment and Plan


Impression


* End-stage renal disease on maintenance hemodialysis


* Severe anemia


* Uncontrolled hypertension


* AV fistula with compromised overlying skin


* Hypercalcemia





Recommendations


* Patient had uneventful hemodialysis on Friday 


* Vascular surgery notes appreciated.  Patient is scheduled for fistulogram as 

  well as PermCath placement tomorrow and surgery for his AV fistula on Tuesday


* Shall plan to dialyze him tomorrow after PermCath placement


* Patient is status post 1 unit packed RBC transfusion.  Recommend GI 

  evaluation.  


* His blood pressure is better this morning 


* Skin overlying his fistula seems to be compromised.  It appears shiny and also

  had small bleeding spots noted.  Vascular surgery consultation appreciated


* Serum calcium is better.  PTH level is elevated at 1330.  Add Sensipar 


* Epogen after better blood pressure control


* Binders with meals


* Adjust diet and meds for ESRD state


* No IV, BP or venipuncture in access site











Subjective


Date of service: 10/17/21


Interval history: 


Patient is comfortable today.  Denies any shortness of breath.  No nausea 

vomiting or diarrhea.








Objective





- Vital Signs


Vital signs: 


                               Vital Signs - 12hr











  10/17/21 10/17/21 10/17/21





  04:47 07:08 09:34


 


Temperature 98.0 F  


 


Pulse Rate 98 H 90 82


 


Respiratory 20 18 





Rate   


 


Blood Pressure 165/78  161/75


 


O2 Sat by Pulse 86 93 





Oximetry   














- General Appearance


General appearance: well-developed, well-nourished, appears stated age


EENT: PERRL, mucous membranes moist


Neck: no JVD, no thyromegaly, no carotid bruit, supple


Respiratory: Present: Clear to Ascultation


Cardiology: regular, normal heart rate, S1S2, no murmurs


Gastrointestinal: normal, normoactive bowel sounds


Integumentary: no rash, warm and dry, other (No edema)





- Lab





                                 10/17/21 07:23





                                 10/17/21 07:23


                             Most recent lab results











Calcium  9.7 mg/dL (8.4-10.2)   10/17/21  07:23    


 


Phosphorus  3.80 mg/dL (2.5-4.5)   10/17/21  07:23    














Medications & Allergies





- Medications


Allergies/Adverse Reactions: 


                                    Allergies





No Known Allergies Allergy (Verified 08/18/21 10:37)


   








Home Medications: 


                                Home Medications











 Medication  Instructions  Recorded  Confirmed  Last Taken  Type


 


Epoetin Amadeo 20,000 Unit [Procrit] 20,000 unit SUB-Q Sa  vial 03/20/20 10/15/21 

Unknown Rx


 


Epoetin Amadeo 20,000 Unit [Procrit] 20,000 unit SUB-Q Th  vial 03/20/20 10/15/21 

Unknown Rx


 


Epoetin Amadeo 20,000 Unit [Procrit] 20,000 unit SUB-Q Tu  vial 03/20/20 10/15/21 

Unknown Rx


 


NIFEdipine [Nifedipine ER] 90 mg PO QDAY #30 08/19/21 10/15/21 Unknown Rx


 


Cinacalcet [Sensipar] 30 mg PO QDAY  tablet 09/16/21 10/15/21 Unknown Rx


 


Epoetin Amadeo-Epbx 10,000 Unit 10,000 unit IV QUITA PRN  vial 09/16/21 10/15/21 

Unknown Rx





[Retacrit]     


 


Furosemide [Lasix TAB] 80 mg PO QDAY #30 09/16/21 10/15/21 Unknown Rx


 


Furosemide [Lasix TAB] 80 mg PO QDAY 30 Days #30 tablet 09/16/21 10/15/21 

Unknown Rx


 


NIFEdipine XL [Procardia Xl] 90 mg PO QDAY  tablet 09/16/21 10/15/21 Unknown Rx


 


Pantoprazole [Protonix TAB] 40 mg PO QDAC  tablet 09/16/21 10/15/21 Unknown Rx


 


carvediloL [Coreg] 25 mg PO QDAY  tablet 09/16/21 10/15/21 Unknown Rx


 


cloNIDine [Catapres] 0.1 mg PO BID  tablet 09/16/21 10/15/21 Unknown Rx


 


hydrALAZINE [Apresoline TAB] 100 mg PO TID  tab 09/16/21 10/15/21 Unknown Rx











Active Medications: 














Generic Name Dose Route Start Last Admin





  Trade Name Freq  PRN Reason Stop Dose Admin


 


Acetaminophen  650 mg  10/15/21 11:00 





  Acetaminophen 325 Mg Tab  PO  





  Q4H PRN  





  Pain MILD(1-3)/Fever >100.5/HA  


 


Carvedilol  25 mg  10/15/21 13:00  10/17/21 09:34





  Carvedilol 25 Mg Tab  PO   25 mg





  BID ARRON   Administration


 


Clonidine HCl  0.1 mg  10/15/21 13:00  10/17/21 09:34





  Clonidine 0.1 Mg Tab  PO   0.1 mg





  Q12HR ARRON   Administration


 


Heparin Sodium (Porcine)  5,000 unit  10/16/21 08:00  10/17/21 07:08





  Heparin 5,000 Unit/1 Ml Vial  SUB-Q   5,000 unit





  Q8HR ARRON   Administration


 


Hydralazine HCl  100 mg  10/15/21 14:00  10/17/21 09:33





  Hydralazine 100 Mg Tab  PO   100 mg





  TID ARRON   Administration


 


Morphine Sulfate  4 mg  10/15/21 10:36  10/16/21 02:08





  Morphine 4 Mg/1 Ml Inj  IV   4 mg





  Q4H PRN   Administration





  Pain , Severe (7-10)  


 


Naloxone HCl  0.1 mg  10/15/21 10:36 





  Naloxone 0.4 Mg/1 Ml Inj  IV  





  Q2MIN PRN  





  Res Rate </= 8 or 02 SAT < 92%  


 


Nifedipine  90 mg  10/15/21 13:00  10/17/21 09:33





  Nifedipine Xl 90 Mg Tab  PO   90 mg





  QDAY ARRON   Administration


 


Ondansetron HCl  4 mg  10/15/21 11:00 





  Ondansetron 4 Mg/2 Ml Inj  IV  





  Q8H PRN  





  Nausea And Vomiting  


 


Oxycodone/Acetaminophen  1 tab  10/15/21 10:36  10/15/21 20:24





  Oxycodone /Acetaminophen 5-325mg Tab  PO   1 tab





  Q6H PRN   Administration





  Pain, Moderate (4-6)  


 


Sodium Chloride  10 ml  10/15/21 22:00  10/17/21 09:35





  Sodium Chloride 0.9% 10 Ml Flush Syringe  IV   10 ml





  BID ARRON   Administration


 


Sodium Chloride  10 ml  10/15/21 10:36 





  Sodium Chloride 0.9% 10 Ml Flush Syringe  IV  





  PRN PRN  





  LINE FLUSH

## 2021-10-17 NOTE — PROGRESS NOTE
Assessment and Plan


Assessment and plan: 





51-year-old male history of ESRD on HD, anemia of chronic disease and medication

noncompliance who presented with complaint of decreased urinary output x3 days. 

Found to be anemic.  Dialyzed while inpatient.  Currently stable.





#ESRD on HD


-Nephrology consulted, HD MWF


-decreased urinary output could be consequence of loss of kidney function





#AV fistula defect


-Vascular surgery following, plan for procedure during the week





#Hypertension


#Malignant hypertension


-patient with severely elevated blood pressure during HD sessions


-BP improved with restarting meds


-continue hydralazine, clonidine, coreg, nifedipine at home doses


-Likely secondary to medication noncompliance, will adjust as necessary





#Acute on anemia of chronic disease


-s/p 1 pack pRBCs 10/15


-Maintain hemoglobin greater than 7


-Likely secondary to ESRD


-Epogen with dialysis





#Paroxysmal atrial fibrillation


-NSR, will continue to monitor





#History of medication noncompliance


-patient admits to not taking prescription since last hospital discharge


-counseled about importance of medication compliance


Disposition Plan: Home


Total Time Spent with Patient (Minutes): 20 minutes





History


Interval history: 





No acute events overnight.  Patient resting.  Denies any complaints.





Hospitalist Physical





- Physical exam


Narrative exam: 





GENERAL: Well-developed well-nourished.  Lying in bed.


HEENT: Normocephalic. Atraumatic.  


CHEST/LUNGS: CTAB 


HEART/CARDIOVASCULAR: RRR. + Systolic murmur at RUSB, LUSB


ABDOMEN: +BS. NT/ND.


EXTREMITIES: LUE AVF. No cyanosis, clubbing or edema.


PSYCH: Cooperative.





- Constitutional


Vitals: 


                                        











Temp Pulse Resp BP Pulse Ox


 


 98.0 F   90   18   165/78   93 


 


 10/17/21 04:47  10/17/21 07:08  10/17/21 07:08  10/17/21 04:47  10/17/21 07:08











General appearance: Present: no acute distress





Results





- Labs


CBC & Chem 7: 


                                 10/17/21 07:23





                                 10/17/21 07:23


Labs: 


                             Laboratory Last Values











WBC  6.6 K/mm3 (4.5-11.0)   10/17/21  07:23    


 


RBC  2.66 M/mm3 (3.65-5.03)  L  10/17/21  07:23    


 


Hgb  8.7 gm/dl (11.8-15.2)  L  10/17/21  07:23    


 


Hct  24.9 % (35.5-45.6)  L  10/17/21  07:23    


 


MCV  93 fl (84-94)   10/17/21  07:23    


 


MCH  33 pg (28-32)  H  10/17/21  07:23    


 


MCHC  35 % (32-34)  H  10/17/21  07:23    


 


RDW  18.6 % (13.2-15.2)  H  10/17/21  07:23    


 


Plt Count  213 K/mm3 (140-440)   10/17/21  07:23    


 


Lymph % (Auto)  13.4 % (13.4-35.0)   10/16/21  10:41    


 


Mono % (Auto)  11.2 % (0.0-7.3)  H  10/16/21  10:41    


 


Eos % (Auto)  4.9 % (0.0-4.3)  H  10/16/21  10:41    


 


Baso % (Auto)  0.6 % (0.0-1.8)   10/16/21  10:41    


 


Lymph # (Auto)  1.0 K/mm3 (1.2-5.4)  L  10/16/21  10:41    


 


Mono # (Auto)  0.8 K/mm3 (0.0-0.8)   10/16/21  10:41    


 


Eos # (Auto)  0.4 K/mm3 (0.0-0.4)   10/16/21  10:41    


 


Baso # (Auto)  0.0 K/mm3 (0.0-0.1)   10/16/21  10:41    


 


Add Manual Diff  Complete   10/15/21  06:48    


 


Total Counted  100   10/15/21  06:48    


 


Seg Neutrophils %  69.9 % (40.0-70.0)   10/16/21  10:41    


 


Seg Neuts % (Manual)  83.0 % (40.0-70.0)  H  10/15/21  06:48    


 


Lymphocytes % (Manual)  9.0 % (13.4-35.0)  L  10/15/21  06:48    


 


Monocytes % (Manual)  1.0 % (0.0-7.3)   10/15/21  06:48    


 


Eosinophils % (Manual)  7.0 % (0.0-4.3)  H  10/15/21  06:48    


 


Nucleated RBC %  Not Reportable   10/15/21  06:48    


 


Seg Neutrophils #  5.2 K/mm3 (1.8-7.7)   10/16/21  10:41    


 


Seg Neutrophils # Man  5.2 K/mm3 (1.8-7.7)   10/15/21  06:48    


 


Band Neutrophils #  0.0 K/mm3  10/15/21  06:48    


 


Lymphocytes # (Manual)  0.6 K/mm3 (1.2-5.4)  L  10/15/21  06:48    


 


Abs React Lymphs (Man)  0.0 K/mm3  10/15/21  06:48    


 


Monocytes # (Manual)  0.1 K/mm3 (0.0-0.8)   10/15/21  06:48    


 


Eosinophils # (Manual)  0.4 K/mm3 (0.0-0.4)   10/15/21  06:48    


 


Basophils # (Manual)  0.0 K/mm3 (0.0-0.1)   10/15/21  06:48    


 


Metamyelocytes #  0.0 K/mm3  10/15/21  06:48    


 


Myelocytes #  0.0 K/mm3  10/15/21  06:48    


 


Promyelocytes #  0.0 K/mm3  10/15/21  06:48    


 


Blast Cells #  0.0 K/mm3  10/15/21  06:48    


 


WBC Morphology  Not Reportable   10/15/21  06:48    


 


Hypersegmented Neuts  Not Reportable   10/15/21  06:48    


 


Hyposegmented Neuts  Not Reportable   10/15/21  06:48    


 


Hypogranular Neuts  Not Reportable   10/15/21  06:48    


 


Smudge Cells  Not Reportable   10/15/21  06:48    


 


Toxic Granulation  Not Reportable   10/15/21  06:48    


 


Toxic Vacuolation  Not Reportable   10/15/21  06:48    


 


Dohle Bodies  Not Reportable   10/15/21  06:48    


 


Pelger-Huet Anomaly  Not Reportable   10/15/21  06:48    


 


Nel Rods  Not Reportable   10/15/21  06:48    


 


Platelet Estimate  Consistent w auto   10/15/21  06:48    


 


Clumped Platelets  Not Reportable   10/15/21  06:48    


 


Plt Clumps, EDTA  Not Reportable   10/15/21  06:48    


 


Large Platelets  Not Reportable   10/15/21  06:48    


 


Giant Platelets  Not Reportable   10/15/21  06:48    


 


Platelet Satelliting  Not Reportable   10/15/21  06:48    


 


Plt Morphology Comment  Not Reportable   10/15/21  06:48    


 


RBC Morphology  Not Reportable   10/15/21  06:48    


 


Dimorphic RBCs  Not Reportable   10/15/21  06:48    


 


Polychromasia  Not Reportable   10/15/21  06:48    


 


Hypochromasia  Not Reportable   10/15/21  06:48    


 


Poikilocytosis  1+   10/15/21  06:48    


 


Anisocytosis  1+   10/15/21  06:48    


 


Microcytosis  Not Reportable   10/15/21  06:48    


 


Macrocytosis  Not Reportable   10/15/21  06:48    


 


Spherocytes  1+   10/15/21  06:48    


 


Pappenheimer Bodies  Not Reportable   10/15/21  06:48    


 


Sickle Cells  Not Reportable   10/15/21  06:48    


 


Target Cells  1+   10/15/21  06:48    


 


Tear Drop Cells  Not Reportable   10/15/21  06:48    


 


Ovalocytes  Not Reportable   10/15/21  06:48    


 


Helmet Cells  Not Reportable   10/15/21  06:48    


 


Meredith-Toston Bodies  Not Reportable   10/15/21  06:48    


 


Cabot Rings  Not Reportable   10/15/21  06:48    


 


Bubba Cells  Not Reportable   10/15/21  06:48    


 


Bite Cells  Not Reportable   10/15/21  06:48    


 


Crenated Cell  Not Reportable   10/15/21  06:48    


 


Elliptocytes  Not Reportable   10/15/21  06:48    


 


Acanthocytes (Spur)  Not Reportable   10/15/21  06:48    


 


Rouleaux  Not Reportable   10/15/21  06:48    


 


Hemoglobin C Crystals  Not Reportable   10/15/21  06:48    


 


Schistocytes  Not Reportable   10/15/21  06:48    


 


Malaria parasites  Not Reportable   10/15/21  06:48    


 


Behzad Bodies  Not Reportable   10/15/21  06:48    


 


Hem Pathologist Commnt  No   10/15/21  06:48    


 


Sodium  136 mmol/L (137-145)  L  10/17/21  07:23    


 


Potassium  4.5 mmol/L (3.6-5.0)   10/17/21  07:23    


 


Chloride  94.1 mmol/L ()  L  10/17/21  07:23    


 


Carbon Dioxide  27 mmol/L (22-30)   10/17/21  07:23    


 


Anion Gap  19 mmol/L  10/17/21  07:23    


 


BUN  44 mg/dL (9-20)  H  10/17/21  07:23    


 


Creatinine  7.4 mg/dL (0.8-1.3)  H  10/17/21  07:23    


 


Estimated GFR  9 ml/min  10/17/21  07:23    


 


BUN/Creatinine Ratio  6 %  10/17/21  07:23    


 


Glucose  106 mg/dL ()  H  10/17/21  07:23    


 


Calcium  9.7 mg/dL (8.4-10.2)   10/17/21  07:23    


 


Phosphorus  3.80 mg/dL (2.5-4.5)   10/17/21  07:23    


 


PTH Intact  1332 pg/mL (15-65)  H  10/17/21  07:23    


 


Nasal Screen MRSA (PCR)  Negative  (Negative)   10/16/21  Unknown


 


Hepatitis A IgM Ab  Non-reactive  (NonReactive)   10/15/21  09:35    


 


Hep Bs Antigen  Nonreactive  (Negative)   10/15/21  09:35    


 


Hep B Core IgM Ab  Non-reactive  (NonReactive)   10/15/21  09:35    


 


Hepatitis C Antibody  Non-reactive  (NonReactive)   10/15/21  09:35    


 


Blood Type  O POSITIVE   10/15/21  10:30    


 


Antibody Screen  Negative   10/15/21  10:30    


 


Crossmatch  See Detail   10/15/21  10:30    











Mcmahon/IV: 


                                        





Voiding Method                   Toilet











Active Medications





- Current Medications


Current Medications: 














Generic Name Dose Route Start Last Admin





  Trade Name Freq  PRN Reason Stop Dose Admin


 


Acetaminophen  650 mg  10/15/21 11:00 





  Acetaminophen 325 Mg Tab  PO  





  Q4H PRN  





  Pain MILD(1-3)/Fever >100.5/HA  


 


Carvedilol  25 mg  10/15/21 13:00  10/16/21 21:58





  Carvedilol 25 Mg Tab  PO   25 mg





  BID ARRON   Administration


 


Clonidine HCl  0.1 mg  10/15/21 13:00  10/16/21 23:28





  Clonidine 0.1 Mg Tab  PO   0.1 mg





  Q12HR ARRON   Administration


 


Heparin Sodium (Porcine)  5,000 unit  10/16/21 08:00  10/17/21 07:08





  Heparin 5,000 Unit/1 Ml Vial  SUB-Q   5,000 unit





  Q8HR ARRON   Administration


 


Hydralazine HCl  100 mg  10/15/21 14:00  10/16/21 21:58





  Hydralazine 100 Mg Tab  PO   100 mg





  TID ARRON   Administration


 


Morphine Sulfate  4 mg  10/15/21 10:36  10/16/21 02:08





  Morphine 4 Mg/1 Ml Inj  IV   4 mg





  Q4H PRN   Administration





  Pain , Severe (7-10)  


 


Naloxone HCl  0.1 mg  10/15/21 10:36 





  Naloxone 0.4 Mg/1 Ml Inj  IV  





  Q2MIN PRN  





  Res Rate </= 8 or 02 SAT < 92%  


 


Nifedipine  90 mg  10/15/21 13:00  10/16/21 12:06





  Nifedipine Xl 90 Mg Tab  PO   90 mg





  QDAY ARRON   Administration


 


Ondansetron HCl  4 mg  10/15/21 11:00 





  Ondansetron 4 Mg/2 Ml Inj  IV  





  Q8H PRN  





  Nausea And Vomiting  


 


Oxycodone/Acetaminophen  1 tab  10/15/21 10:36  10/15/21 20:24





  Oxycodone /Acetaminophen 5-325mg Tab  PO   1 tab





  Q6H PRN   Administration





  Pain, Moderate (4-6)  


 


Sodium Chloride  10 ml  10/15/21 22:00  10/16/21 21:58





  Sodium Chloride 0.9% 10 Ml Flush Syringe  IV   10 ml





  BID ARRON   Administration


 


Sodium Chloride  10 ml  10/15/21 10:36 





  Sodium Chloride 0.9% 10 Ml Flush Syringe  IV  





  PRN PRN  





  LINE FLUSH

## 2021-10-18 PROCEDURE — 05763ZZ DILATION OF LEFT SUBCLAVIAN VEIN, PERCUTANEOUS APPROACH: ICD-10-PCS | Performed by: RADIOLOGY

## 2021-10-18 PROCEDURE — 0JH63XZ INSERTION OF TUNNELED VASCULAR ACCESS DEVICE INTO CHEST SUBCUTANEOUS TISSUE AND FASCIA, PERCUTANEOUS APPROACH: ICD-10-PCS | Performed by: RADIOLOGY

## 2021-10-18 PROCEDURE — B5181ZZ FLUOROSCOPY OF SUPERIOR VENA CAVA USING LOW OSMOLAR CONTRAST: ICD-10-PCS | Performed by: RADIOLOGY

## 2021-10-18 PROCEDURE — 02HV33Z INSERTION OF INFUSION DEVICE INTO SUPERIOR VENA CAVA, PERCUTANEOUS APPROACH: ICD-10-PCS | Performed by: RADIOLOGY

## 2021-10-18 PROCEDURE — 057C3ZZ DILATION OF LEFT BASILIC VEIN, PERCUTANEOUS APPROACH: ICD-10-PCS | Performed by: RADIOLOGY

## 2021-10-18 PROCEDURE — 5A1D70Z PERFORMANCE OF URINARY FILTRATION, INTERMITTENT, LESS THAN 6 HOURS PER DAY: ICD-10-PCS | Performed by: STUDENT IN AN ORGANIZED HEALTH CARE EDUCATION/TRAINING PROGRAM

## 2021-10-18 RX ADMIN — LIDOCAINE HYDROCHLORIDE,EPINEPHRINE BITARTRATE ONE ML: 10; .01 INJECTION, SOLUTION INFILTRATION; PERINEURAL at 09:49

## 2021-10-18 RX ADMIN — HYDRALAZINE HYDROCHLORIDE SCH MG: 100 TABLET, FILM COATED ORAL at 16:03

## 2021-10-18 RX ADMIN — Medication SCH ML: at 22:00

## 2021-10-18 RX ADMIN — HEPARIN SODIUM SCH UNIT: 5000 INJECTION, SOLUTION INTRAVENOUS; SUBCUTANEOUS at 22:00

## 2021-10-18 RX ADMIN — LIDOCAINE HYDROCHLORIDE,EPINEPHRINE BITARTRATE ONE ML: 10; .01 INJECTION, SOLUTION INFILTRATION; PERINEURAL at 10:13

## 2021-10-18 RX ADMIN — HEPARIN SODIUM SCH: 5000 INJECTION, SOLUTION INTRAVENOUS; SUBCUTANEOUS at 06:37

## 2021-10-18 RX ADMIN — CINACALCET HYDROCHLORIDE SCH MG: 30 TABLET, FILM COATED ORAL at 16:03

## 2021-10-18 RX ADMIN — HEPARIN SODIUM SCH UNIT: 5000 INJECTION, SOLUTION INTRAVENOUS; SUBCUTANEOUS at 16:08

## 2021-10-18 RX ADMIN — HYDRALAZINE HYDROCHLORIDE SCH: 100 TABLET, FILM COATED ORAL at 08:00

## 2021-10-18 RX ADMIN — Medication SCH ML: at 16:05

## 2021-10-18 RX ADMIN — HYDRALAZINE HYDROCHLORIDE SCH MG: 100 TABLET, FILM COATED ORAL at 20:38

## 2021-10-18 RX ADMIN — NIFEDIPINE SCH MG: 90 TABLET, EXTENDED RELEASE ORAL at 16:03

## 2021-10-18 NOTE — PROGRESS NOTE
Assessment and Plan


Impression


* End-stage renal disease on maintenance hemodialysis


* Severe anemia


* Uncontrolled hypertension


* AV fistula with compromised overlying skin


* Hypercalcemia





Recommendations


* S/p permcath placement 10/18


* Continue HD MWF


* Vascular surgery notes reviewed.  Patient is scheduled for AV fistula 

  intervention on Tuesday


* Patient is status post 1 unit packed RBC transfusion.  Recommend GI 

  evaluation.  


* Serum calcium is better.  PTH level is elevated at 1330.  Added Sensipar 


* Epogen after better blood pressure control


* Binders with meals


* Adjust diet and meds for ESRD state


* No IV, BP or venipuncture at access site








Subjective


Date of service: 10/18/21


Principal diagnosis: Anemia


Interval history: 


Status post PermCath placement this morning.  Seen during dialysis post catheter

placement.








Objective





- Vital Signs


Vital signs: 


                               Vital Signs - 12hr











  10/18/21 10/18/21 10/18/21





  05:59 07:42 10:00


 


Temperature 98.6 F 98.6 F 


 


Pulse Rate 78 79 


 


Respiratory 20 18 





Rate   


 


Blood Pressure 134/76  


 


Blood Pressure  141/80 





[Left]   


 


O2 Sat by Pulse 96 100 100





Oximetry   


 


O2 Sat by Pulse   





Oximetry [   





Anterior   





Bilateral   





Throughout]   


 


O2 Sat by Pulse   





Oximetry [   





Posterior   





Bilateral   





Throughout]   


 


O2 Sat by Pulse   





Oximetry [   





Posterior   





Bilateral]   














  10/18/21 10/18/21 10/18/21





  11:00 11:15 11:30


 


Temperature 98.6 F  


 


Pulse Rate 82 80 81


 


Respiratory 18  





Rate   


 


Blood Pressure 149/80 142/81 149/83


 


Blood Pressure   





[Left]   


 


O2 Sat by Pulse   





Oximetry   


 


O2 Sat by Pulse 99  





Oximetry [   





Anterior   





Bilateral   





Throughout]   


 


O2 Sat by Pulse 99  





Oximetry [   





Posterior   





Bilateral   





Throughout]   


 


O2 Sat by Pulse 99  





Oximetry [   





Posterior   





Bilateral]   














  10/18/21 10/18/21 10/18/21





  11:45 12:00 12:15


 


Temperature   


 


Pulse Rate 82 81 81


 


Respiratory   





Rate   


 


Blood Pressure 144/82 132/82 134/71


 


Blood Pressure   





[Left]   


 


O2 Sat by Pulse   





Oximetry   


 


O2 Sat by Pulse   





Oximetry [   





Anterior   





Bilateral   





Throughout]   


 


O2 Sat by Pulse   





Oximetry [   





Posterior   





Bilateral   





Throughout]   


 


O2 Sat by Pulse   





Oximetry [   





Posterior   





Bilateral]   














  10/18/21 10/18/21 10/18/21





  12:30 12:45 13:00


 


Temperature   


 


Pulse Rate 83 85 85


 


Respiratory   





Rate   


 


Blood Pressure 143/77 150/85 148/86


 


Blood Pressure   





[Left]   


 


O2 Sat by Pulse   





Oximetry   


 


O2 Sat by Pulse   





Oximetry [   





Anterior   





Bilateral   





Throughout]   


 


O2 Sat by Pulse   





Oximetry [   





Posterior   





Bilateral   





Throughout]   


 


O2 Sat by Pulse   





Oximetry [   





Posterior   





Bilateral]   














  10/18/21 10/18/21 10/18/21





  13:15 13:30 13:45


 


Temperature   


 


Pulse Rate 85 87 88


 


Respiratory   





Rate   


 


Blood Pressure 152/88 156/97 160/87


 


Blood Pressure   





[Left]   


 


O2 Sat by Pulse   





Oximetry   


 


O2 Sat by Pulse   





Oximetry [   





Anterior   





Bilateral   





Throughout]   


 


O2 Sat by Pulse   





Oximetry [   





Posterior   





Bilateral   





Throughout]   


 


O2 Sat by Pulse   





Oximetry [   





Posterior   





Bilateral]   














  10/18/21 10/18/21 10/18/21





  14:00 14:15 14:30


 


Temperature   


 


Pulse Rate 88 87 88


 


Respiratory   





Rate   


 


Blood Pressure 158/93 168/94 168/98


 


Blood Pressure   





[Left]   


 


O2 Sat by Pulse   





Oximetry   


 


O2 Sat by Pulse   





Oximetry [   





Anterior   





Bilateral   





Throughout]   


 


O2 Sat by Pulse   





Oximetry [   





Posterior   





Bilateral   





Throughout]   


 


O2 Sat by Pulse   





Oximetry [   





Posterior   





Bilateral]   














  10/18/21





  14:45


 


Temperature 98.4 F


 


Pulse Rate 88


 


Respiratory 18





Rate 


 


Blood Pressure 168/96


 


Blood Pressure 





[Left] 


 


O2 Sat by Pulse 





Oximetry 


 


O2 Sat by Pulse 99





Oximetry [ 





Anterior 





Bilateral 





Throughout] 


 


O2 Sat by Pulse 99





Oximetry [ 





Posterior 





Bilateral 





Throughout] 


 


O2 Sat by Pulse 99





Oximetry [ 





Posterior 





Bilateral] 














- Lab





                                 10/17/21 07:23





                                 10/17/21 07:23


                             Most recent lab results











Calcium  9.7 mg/dL (8.4-10.2)   10/17/21  07:23    


 


Phosphorus  3.80 mg/dL (2.5-4.5)   10/17/21  07:23    














Medications & Allergies





- Medications


Allergies/Adverse Reactions: 


                                    Allergies





No Known Allergies Allergy (Verified 08/18/21 10:37)


   








Home Medications: 


                                Home Medications











 Medication  Instructions  Recorded  Confirmed  Last Taken  Type


 


Epoetin Amadeo 20,000 Unit [Procrit] 20,000 unit SUB-Q Sa  vial 03/20/20 10/15/21 

Unknown Rx


 


Epoetin Amadeo 20,000 Unit [Procrit] 20,000 unit SUB-Q Th  vial 03/20/20 10/15/21 

Unknown Rx


 


Epoetin Amadeo 20,000 Unit [Procrit] 20,000 unit SUB-Q Tu  vial 03/20/20 10/15/21 

Unknown Rx


 


NIFEdipine [Nifedipine ER] 90 mg PO QDAY #30 08/19/21 10/15/21 Unknown Rx


 


Cinacalcet [Sensipar] 30 mg PO QDAY  tablet 09/16/21 10/15/21 Unknown Rx


 


Epoetin Amadeo-Epbx 10,000 Unit 10,000 unit IV QUITA PRN  vial 09/16/21 10/15/21 

Unknown Rx





[Retacrit]     


 


Furosemide [Lasix TAB] 80 mg PO QDAY #30 09/16/21 10/15/21 Unknown Rx


 


Furosemide [Lasix TAB] 80 mg PO QDAY 30 Days #30 tablet 09/16/21 10/15/21 

Unknown Rx


 


NIFEdipine XL [Procardia Xl] 90 mg PO QDAY  tablet 09/16/21 10/15/21 Unknown Rx


 


Pantoprazole [Protonix TAB] 40 mg PO QDAC  tablet 09/16/21 10/15/21 Unknown Rx


 


carvediloL [Coreg] 25 mg PO QDAY  tablet 09/16/21 10/15/21 Unknown Rx


 


cloNIDine [Catapres] 0.1 mg PO BID  tablet 09/16/21 10/15/21 Unknown Rx


 


hydrALAZINE [Apresoline TAB] 100 mg PO TID  tab 09/16/21 10/15/21 Unknown Rx











Active Medications: 














Generic Name Dose Route Start Last Admin





  Trade Name Freq  PRN Reason Stop Dose Admin


 


Acetaminophen  650 mg  10/15/21 11:00 





  Acetaminophen 325 Mg Tab  PO  





  Q4H PRN  





  Pain MILD(1-3)/Fever >100.5/HA  


 


Carvedilol  25 mg  10/15/21 13:00  10/18/21 16:03





  Carvedilol 25 Mg Tab  PO   25 mg





  BID ARRON   Administration


 


Cinacalcet  30 mg  10/18/21 10:00  10/18/21 16:03





  Cinacalcet 30 Mg Tab  PO   30 mg





  QDAY ARRON   Administration


 


Clonidine HCl  0.1 mg  10/15/21 13:00  10/18/21 16:03





  Clonidine 0.1 Mg Tab  PO   0.1 mg





  Q12HR ARRON   Administration


 


Heparin Sodium (Porcine)  5,000 unit  10/16/21 08:00  10/18/21 16:08





  Heparin 5,000 Unit/1 Ml Vial  SUB-Q   5,000 unit





  Q8HR ARRON   Administration


 


Hydralazine HCl  100 mg  10/15/21 14:00  10/18/21 16:03





  Hydralazine 100 Mg Tab  PO   100 mg





  TID ARRON   Administration


 


Morphine Sulfate  4 mg  10/15/21 10:36  10/16/21 02:08





  Morphine 4 Mg/1 Ml Inj  IV   4 mg





  Q4H PRN   Administration





  Pain , Severe (7-10)  


 


Naloxone HCl  0.1 mg  10/15/21 10:36 





  Naloxone 0.4 Mg/1 Ml Inj  IV  





  Q2MIN PRN  





  Res Rate </= 8 or 02 SAT < 92%  


 


Nifedipine  90 mg  10/15/21 13:00  10/18/21 16:03





  Nifedipine Xl 90 Mg Tab  PO   90 mg





  QDAY ARRON   Administration


 


Ondansetron HCl  4 mg  10/15/21 11:00 





  Ondansetron 4 Mg/2 Ml Inj  IV  





  Q8H PRN  





  Nausea And Vomiting  


 


Oxycodone/Acetaminophen  1 tab  10/15/21 10:36  10/15/21 20:24





  Oxycodone /Acetaminophen 5-325mg Tab  PO   1 tab





  Q6H PRN   Administration





  Pain, Moderate (4-6)  


 


Sodium Chloride  10 ml  10/15/21 22:00  10/18/21 16:05





  Sodium Chloride 0.9% 10 Ml Flush Syringe  IV   10 ml





  BID ARRON   Administration


 


Sodium Chloride  10 ml  10/15/21 10:36 





  Sodium Chloride 0.9% 10 Ml Flush Syringe  IV  





  PRN PRN  





  LINE FLUSH

## 2021-10-18 NOTE — ANESTHESIA CONSULTATION
Anesthesia Consult and Med Hx


Date of service: 10/18/21





- Airway


Anesthetic Teeth Evaluation: Good


ROM Head & Neck: Adequate


Mental/Hyoid Distance: Adequate


Mallampati Class: Class III


Intubation Access Assessment: Probably Good





- Pulmonary Exam


CTA: Yes





- Cardiac Exam


Cardiac Exam: RRR





- Pre-Operative Health Status


ASA Pre-Surgery Classification: ASA3


Proposed Anesthetic Plan: General





- Pulmonary


Hx Smoking: No


Hx Asthma: Yes


COPD: No


Hx Pneumonia: No


Hx Sleep Apnea: No





- Cardiovascular System


Hx Hypertension: Yes


Hx Heart Attack/AMI: No


Hx Cardia Arrhythmia: Yes (Paroxysmal A-Fib)


Hx Pacemaker: No


Hx Valvular Heart Disease: No





- Central Nervous System


CVA: Yes (2012-denies residual)





- Gastrointestinal


Hx Gastroesophageal Reflux Disease: Yes





- Endocrine


Hx Renal Disease: Yes (HD MWF - last on 10/18/21)


Hx End Stage Renal Disease: Yes


Hx Liver Disease: No


Hx Non-Insulin Dependent Diabetes: No


Hx Thyroid Disease: No





- Hematic


Hx Anemia: Yes





- Other Systems


Hx Alcohol Use: No


Hx Substance Use: No


Hx Cancer: No


Hx Obesity: No





- Additional Comments


Anesthesia Medical History Comments: No hx of anesthetic complications

## 2021-10-18 NOTE — PROGRESS NOTE
Assessment and Plan


Assessment and plan: 





51-year-old male history of ESRD on HD, anemia of chronic disease and medication

noncompliance who presented with complaint of decreased urinary output x3 days. 

Found to be anemic.  Dialyzed while inpatient.  Currently stable.





#ESRD on HD


-Nephrology consulted, HD MWF


-New place today by vascular surgery


-Dialysis today


-decreased urinary output could be consequence of loss of kidney function





#AV fistula defect


-Fistulogram today, will plan for OR for revision of L aVF


-Vascular surgery managing





#Hypertension


#Malignant hypertension


-patient with severely elevated blood pressure during HD sessions


-BP improved with restarting meds


-continue hydralazine, clonidine, coreg, nifedipine at home doses


-Likely secondary to medication noncompliance, will adjust as necessary





#Acute on anemia of chronic disease


-s/p 1 pack pRBCs 10/15


-Maintain hemoglobin greater than 7


-Likely secondary to ESRD


-Epogen with dialysis





#Paroxysmal atrial fibrillation


-NSR, will continue to monitor





#History of medication noncompliance


-patient admits to not taking prescription since last hospital discharge


-counseled about importance of medication compliance


Disposition Plan: Continue medical management


Total Time Spent with Patient (Minutes): 20 minutes





History


Interval history: 





No acute events overnight.  Patient seen during dialysis.  Denies any 

complaints.





Hospitalist Physical





- Physical exam


Narrative exam: 





GENERAL: Well-developed well-nourished.  Lying in bed.


CHEST/LUNGS: Right chest permacath. CTAB.


HEART/CARDIOVASCULAR: RRR. + Systolic murmur at RUSB, LUSB


ABDOMEN: +BS. NT/ND.


EXTREMITIES: LUE AVF. No cyanosis, clubbing or edema.


PSYCH: Cooperative.





- Constitutional


Vitals: 


                                        











Temp Pulse Resp BP Pulse Ox


 


 98.6 F   79   18   141/80   100 


 


 10/18/21 07:42  10/18/21 07:42  10/18/21 07:42  10/18/21 07:42  10/18/21 07:42











General appearance: Present: no acute distress





Results





- Labs


CBC & Chem 7: 


                                 10/17/21 07:23





                                 10/17/21 07:23


Labs: 


                             Laboratory Last Values











WBC  6.6 K/mm3 (4.5-11.0)   10/17/21  07:23    


 


RBC  2.66 M/mm3 (3.65-5.03)  L  10/17/21  07:23    


 


Hgb  8.7 gm/dl (11.8-15.2)  L  10/17/21  07:23    


 


Hct  24.9 % (35.5-45.6)  L  10/17/21  07:23    


 


MCV  93 fl (84-94)   10/17/21  07:23    


 


MCH  33 pg (28-32)  H  10/17/21  07:23    


 


MCHC  35 % (32-34)  H  10/17/21  07:23    


 


RDW  18.6 % (13.2-15.2)  H  10/17/21  07:23    


 


Plt Count  213 K/mm3 (140-440)   10/17/21  07:23    


 


Lymph % (Auto)  13.4 % (13.4-35.0)   10/16/21  10:41    


 


Mono % (Auto)  11.2 % (0.0-7.3)  H  10/16/21  10:41    


 


Eos % (Auto)  4.9 % (0.0-4.3)  H  10/16/21  10:41    


 


Baso % (Auto)  0.6 % (0.0-1.8)   10/16/21  10:41    


 


Lymph # (Auto)  1.0 K/mm3 (1.2-5.4)  L  10/16/21  10:41    


 


Mono # (Auto)  0.8 K/mm3 (0.0-0.8)   10/16/21  10:41    


 


Eos # (Auto)  0.4 K/mm3 (0.0-0.4)   10/16/21  10:41    


 


Baso # (Auto)  0.0 K/mm3 (0.0-0.1)   10/16/21  10:41    


 


Add Manual Diff  Complete   10/17/21  07:23    


 


Total Counted  100   10/17/21  07:23    


 


Seg Neutrophils %  69.9 % (40.0-70.0)   10/16/21  10:41    


 


Seg Neuts % (Manual)  85.0 % (40.0-70.0)  H  10/17/21  07:23    


 


Lymphocytes % (Manual)  4.0 % (13.4-35.0)  L  10/17/21  07:23    


 


Monocytes % (Manual)  5.0 % (0.0-7.3)   10/17/21  07:23    


 


Eosinophils % (Manual)  6.0 % (0.0-4.3)  H  10/17/21  07:23    


 


Nucleated RBC %  Not Reportable   10/17/21  07:23    


 


Seg Neutrophils #  5.2 K/mm3 (1.8-7.7)   10/16/21  10:41    


 


Seg Neutrophils # Man  5.6 K/mm3 (1.8-7.7)   10/17/21  07:23    


 


Band Neutrophils #  0.0 K/mm3  10/17/21  07:23    


 


Lymphocytes # (Manual)  0.3 K/mm3 (1.2-5.4)  L  10/17/21  07:23    


 


Abs React Lymphs (Man)  0.0 K/mm3  10/17/21  07:23    


 


Monocytes # (Manual)  0.3 K/mm3 (0.0-0.8)   10/17/21  07:23    


 


Eosinophils # (Manual)  0.4 K/mm3 (0.0-0.4)   10/17/21  07:23    


 


Basophils # (Manual)  0.0 K/mm3 (0.0-0.1)   10/17/21  07:23    


 


Metamyelocytes #  0.0 K/mm3  10/17/21  07:23    


 


Myelocytes #  0.0 K/mm3  10/17/21  07:23    


 


Promyelocytes #  0.0 K/mm3  10/17/21  07:23    


 


Blast Cells #  0.0 K/mm3  10/17/21  07:23    


 


WBC Morphology  Not Reportable   10/17/21  07:23    


 


Hypersegmented Neuts  Not Reportable   10/17/21  07:23    


 


Hyposegmented Neuts  Not Reportable   10/17/21  07:23    


 


Hypogranular Neuts  Not Reportable   10/17/21  07:23    


 


Smudge Cells  Not Reportable   10/17/21  07:23    


 


Toxic Granulation  Not Reportable   10/17/21  07:23    


 


Toxic Vacuolation  Not Reportable   10/17/21  07:23    


 


Dohle Bodies  Not Reportable   10/17/21  07:23    


 


Pelger-Huet Anomaly  Not Reportable   10/17/21  07:23    


 


Nel Rods  Not Reportable   10/17/21  07:23    


 


Platelet Estimate  Consistent w auto   10/17/21  07:23    


 


Clumped Platelets  Not Reportable   10/17/21  07:23    


 


Plt Clumps, EDTA  Not Reportable   10/17/21  07:23    


 


Large Platelets  Not Reportable   10/17/21  07:23    


 


Giant Platelets  Not Reportable   10/17/21  07:23    


 


Platelet Satelliting  Not Reportable   10/17/21  07:23    


 


Plt Morphology Comment  Not Reportable   10/17/21  07:23    


 


RBC Morphology  Not Reportable   10/17/21  07:23    


 


Dimorphic RBCs  Not Reportable   10/17/21  07:23    


 


Polychromasia  Not Reportable   10/17/21  07:23    


 


Hypochromasia  Not Reportable   10/17/21  07:23    


 


Poikilocytosis  2+   10/17/21  07:23    


 


Anisocytosis  2+   10/17/21  07:23    


 


Microcytosis  Not Reportable   10/17/21  07:23    


 


Macrocytosis  Not Reportable   10/17/21  07:23    


 


Spherocytes  Not Reportable   10/17/21  07:23    


 


Pappenheimer Bodies  Not Reportable   10/17/21  07:23    


 


Sickle Cells  Not Reportable   10/17/21  07:23    


 


Target Cells  Not Reportable   10/17/21  07:23    


 


Tear Drop Cells  Not Reportable   10/17/21  07:23    


 


Ovalocytes  Few   10/17/21  07:23    


 


Helmet Cells  Rare   10/17/21  07:23    


 


Meredith-Kilbourne Bodies  Not Reportable   10/17/21  07:23    


 


Cabot Rings  Not Reportable   10/17/21  07:23    


 


Bubba Cells  1+   10/17/21  07:23    


 


Bite Cells  Not Reportable   10/17/21  07:23    


 


Crenated Cell  Not Reportable   10/17/21  07:23    


 


Elliptocytes  Few   10/17/21  07:23    


 


Acanthocytes (Spur)  Not Reportable   10/17/21  07:23    


 


Rouleaux  Not Reportable   10/17/21  07:23    


 


Hemoglobin C Crystals  Not Reportable   10/17/21  07:23    


 


Schistocytes  Rare   10/17/21  07:23    


 


Malaria parasites  Not Reportable   10/17/21  07:23    


 


Behzad Bodies  Not Reportable   10/17/21  07:23    


 


Hem Pathologist Commnt  No   10/17/21  07:23    


 


Sodium  136 mmol/L (137-145)  L  10/17/21  07:23    


 


Potassium  4.5 mmol/L (3.6-5.0)   10/17/21  07:23    


 


Chloride  94.1 mmol/L ()  L  10/17/21  07:23    


 


Carbon Dioxide  27 mmol/L (22-30)   10/17/21  07:23    


 


Anion Gap  19 mmol/L  10/17/21  07:23    


 


BUN  44 mg/dL (9-20)  H  10/17/21  07:23    


 


Creatinine  7.4 mg/dL (0.8-1.3)  H  10/17/21  07:23    


 


Estimated GFR  9 ml/min  10/17/21  07:23    


 


BUN/Creatinine Ratio  6 %  10/17/21  07:23    


 


Glucose  106 mg/dL ()  H  10/17/21  07:23    


 


Calcium  9.7 mg/dL (8.4-10.2)   10/17/21  07:23    


 


Phosphorus  3.80 mg/dL (2.5-4.5)   10/17/21  07:23    


 


PTH Intact  1332 pg/mL (15-65)  H  10/17/21  07:23    


 


Nasal Screen MRSA (PCR)  Negative  (Negative)   10/16/21  Unknown


 


Hepatitis A IgM Ab  Non-reactive  (NonReactive)   10/15/21  09:35    


 


Hep Bs Antigen  Nonreactive  (Negative)   10/15/21  09:35    


 


Hep B Core IgM Ab  Non-reactive  (NonReactive)   10/15/21  09:35    


 


Hepatitis C Antibody  Non-reactive  (NonReactive)   10/15/21  09:35    


 


Blood Type  O POSITIVE   10/15/21  10:30    


 


Antibody Screen  Negative   10/15/21  10:30    


 


Crossmatch  See Detail   10/15/21  10:30    











Mcmahon/IV: 


                                        





Voiding Method                   Toilet











Active Medications





- Current Medications


Current Medications: 














Generic Name Dose Route Start Last Admin





  Trade Name Freq  PRN Reason Stop Dose Admin


 


Acetaminophen  650 mg  10/15/21 11:00 





  Acetaminophen 325 Mg Tab  PO  





  Q4H PRN  





  Pain MILD(1-3)/Fever >100.5/HA  


 


Carvedilol  25 mg  10/15/21 13:00  10/17/21 23:00





  Carvedilol 25 Mg Tab  PO   25 mg





  BID ARRON   Administration


 


Cinacalcet  30 mg  10/18/21 10:00 





  Cinacalcet 30 Mg Tab  PO  





  QDAY ARRON  


 


Clonidine HCl  0.1 mg  10/15/21 13:00  10/17/21 23:00





  Clonidine 0.1 Mg Tab  PO   0.1 mg





  Q12HR ARRON   Administration


 


Heparin Sodium (Porcine)  5,000 unit  10/16/21 08:00  10/18/21 06:37





  Heparin 5,000 Unit/1 Ml Vial  SUB-Q   Not Given





  Q8HR [facility]  


 


Hydralazine HCl  100 mg  10/15/21 14:00  10/17/21 23:01





  Hydralazine 100 Mg Tab  PO   Not Given





  TID ARRON  


 


Morphine Sulfate  4 mg  10/15/21 10:36  10/16/21 02:08





  Morphine 4 Mg/1 Ml Inj  IV   4 mg





  Q4H PRN   Administration





  Pain , Severe (7-10)  


 


Naloxone HCl  0.1 mg  10/15/21 10:36 





  Naloxone 0.4 Mg/1 Ml Inj  IV  





  Q2MIN PRN  





  Res Rate </= 8 or 02 SAT < 92%  


 


Nifedipine  90 mg  10/15/21 13:00  10/17/21 09:33





  Nifedipine Xl 90 Mg Tab  PO   90 mg





  QDAY ARRON   Administration


 


Ondansetron HCl  4 mg  10/15/21 11:00 





  Ondansetron 4 Mg/2 Ml Inj  IV  





  Q8H PRN  





  Nausea And Vomiting  


 


Oxycodone/Acetaminophen  1 tab  10/15/21 10:36  10/15/21 20:24





  Oxycodone /Acetaminophen 5-325mg Tab  PO   1 tab





  Q6H PRN   Administration





  Pain, Moderate (4-6)  


 


Sodium Chloride  10 ml  10/15/21 22:00  10/17/21 23:01





  Sodium Chloride 0.9% 10 Ml Flush Syringe  IV   10 ml





  BID ARRON   Administration


 


Sodium Chloride  10 ml  10/15/21 10:36 





  Sodium Chloride 0.9% 10 Ml Flush Syringe  IV  





  PRN PRN  





  LINE FLUSH

## 2021-10-18 NOTE — OPERATIVE REPORT
Operative Report


Operative Report: 





EXAM: 


1.  Ultrasound-guided access of the right internal jugular vein.


2.  Venography of the SVC.


3.  Fluoroscopic guided placement of a 23 cm tip to cuff right internal jugular 

vein dual-lumen hemodialysis catheter.


4.  Ultrasound-guided access of the left arm AV brachiobasilic fistula, 

antegrade


5.  Fistulogram.


6.  Angioplasty of the left innominate vein and left subclavian vein with a 12 

mm x 40 mm angioplasty balloon (central dialysis access angioplasty)


7.  Angioplasty of the left distal basilic vein with a 12 mm x 40 mm angioplasty

balloon


8.  Angioplasty of the left peripheral basilic vein with a 12 mm x 40 mm 

angioplasty


 


DATE: 10/18/2021


 


: AZAR BARONE MD


 


INDICATION: AV fistula malfunction with thinning skin near rupture who requires 

PermCath and AV fistula


 


MEDICATIONS: Please see nursing report for full details.


 


DEVICES: 


23 cm tip to cuff hemodialysis catheter


12 mm x 40 mm angioplasty balloon


 


CONTRAST: Please see Cath Lab report for full details


 


PROCEDURE: 


The risks, benefits, and alternatives were discussed and informed consent was 

obtained.  The patient was transported to the angiography suite in 

satisfactory/stable condition and was transported onto the angiography table.  

The patient's right internal jugular vein was assessed with ultrasound and deter

mined to be patent prior to procedure.  The patient was prepped and draped in a 

sterile fashion.


 


The puncture site was anesthetized. Under sonographic guidance, the right 

internal jugular vein was punctured with a 21-gauge micropuncture needle and a 

0.018 inch wire was advanced into the inferior vena cava.  It took a moderate 

amount of time to negotiate the 0.018 inch wire past the right innominate vein 

into the SVC.  The micropuncture needle was exchanged for a transitional dilator

and the wire was retracted into the right atrium to regan intravascular distance.

 The wire and inner dilator were removed.  Digital subtraction angiography was 

performed through the dilator demonstrating patency of the SVC in appropriate 

position.  0.035 inch wire was advanced through the transitional dilator into 

the inferior vena cava.


 


A suitable exit site was identified on the patient's chest inferior and lateral 

to the venotomy.  The site was anesthetized with local anesthetic and the track 

was anesthetized.  Dermatotomy was made.  The PermCath was attached to the 

tunneling device and tunneled between the dermatotomy to the venotomy.


 


Over the 0.035 inch wire, serial dilatation was performed with ultimate 

placement of a peel-away sheath.  The catheter was advanced through the peel-

away sheath after the wire was removed and positioned centrally under 

fluoroscopic guidance.  The peel-away sheath was removed.


 


4-0 Vicryl suture was used to close the venotomy and Dermabond was then applied.

 2-0 Ethilon suture was used to secure the catheter at the dermatotomy.  The 

catheter was charged with heparin 1000 units/mL of dead space.  Sterile dressing

and Biopatch applied.





At this time, attention was then turned towards the left arm AV fistula.


 


Under ultrasound guidance, the left arm AV brachiobasilic fistula was accessed 

with a 21-gauge micropuncture needle.  The area was anesthetized prior to 

access.  0.018 inch wire was advanced through the micropuncture needle into the 

fistula and then the needle was exchanged for a 5 Sinhala transitional dilator.  

The inner dilator and wire were removed and a 0.035 inch wire was advanced 

through the venous outflow.  The transitional dilator was exchanged for a 7 

Sinhala short sheath.


 


Fistulogram was performed of the venous outflow and central veins.  Reflux into 

the arterial anastomosis was performed.


 


Digital subtraction angiography demonstrated a large arterial anastomosis with 

patulous perianastomotic segment with scattered calcifications.  The peripheral 

and mid portions of the basilic vein were widely patent.  The central portion of

the basilic vein had a relative 60% stenosis.  The axillary vein was patent.  

The subclavian vein had a 30 to 40% central stenosis.  The left innominate vein 

had a central 90% stenosis with patency of the rest of the innominate vein.





12 mm x 40 mm angioplasty balloon was then used to perform angioplasty of the 

left innominate vein, left subclavian vein, left central basilic vein, and 

perianastomotic basilic vein.





Digital subtraction angiography demonstrated less than 20% residual narrowing of

the left innominate and left subclavian vein.  The left central basilic vein had

a 30% relative residual stenosis and the left perianastomotic basilic vein had 

less than 10% residual narrowing.


 


The wire was removed and the site was closed with a 3-0 Vicryl suture.  The 

sheath was then removed.  Hemostasis was achieved with slight manual 

compression.  The patient was transported from the angiography suite to the 

floor in stable condition.


 


IMPRESSION: 


Successful tunneled cuffed hemodialysis catheter placement.


Successful peripheral dialysis access angioplasty.


Successful central dialysis access angioplasty.

## 2021-10-18 NOTE — POST OPERATIVE NOTE
Date of procedure: 10/18/21


Pre-op diagnosis: AVF malfunction


Post-op diagnosis: same


Procedure: 


1.  Ultrasound-guided access of the right internal jugular vein.


2.  Venography of the SVC.


3.  Fluoroscopic guided placement of a 23 cm tip to cuff right internal jugular 

vein dual-lumen hemodialysis catheter.


4.  Ultrasound-guided access of the left arm AV fistula, antegrade


5.  Fistulogram.


6.  Angioplasty of the left innominate vein and left subclavian vein with a 12 

mm x 40 mm angioplasty balloon (central dialysis access angioplasty)


7.  Angioplasty of the left distal basilic vein with a 12 mm x 40 mm angioplasty

balloon


8.  Angioplasty of the left peripheral basilic vein with a 12 mm x 40 mm 

angioplasty


Anesthesia: local


Surgeon: AZAR BARONE


Estimated blood loss: minimal


Condition: stable


Disposition: floor

## 2021-10-19 LAB
BUN SERPL-MCNC: 29 MG/DL (ref 9–20)
BUN/CREAT SERPL: 5 %
CALCIUM SERPL-MCNC: 9 MG/DL (ref 8.4–10.2)
HCT VFR BLD CALC: 24.1 % (ref 35.5–45.6)
HEMOLYSIS INDEX: 3
HGB BLD-MCNC: 8.1 GM/DL (ref 11.8–15.2)
MCHC RBC AUTO-ENTMCNC: 34 % (ref 32–34)
MCV RBC AUTO: 95 FL (ref 84–94)
PLATELET # BLD: 197 K/MM3 (ref 140–440)
RBC # BLD AUTO: 2.53 M/MM3 (ref 3.65–5.03)

## 2021-10-19 PROCEDURE — 05WY0KZ REVISION OF NONAUTOLOGOUS TISSUE SUBSTITUTE IN UPPER VEIN, OPEN APPROACH: ICD-10-PCS | Performed by: SURGERY

## 2021-10-19 RX ADMIN — CINACALCET HYDROCHLORIDE SCH: 30 TABLET, FILM COATED ORAL at 10:00

## 2021-10-19 RX ADMIN — HYDRALAZINE HYDROCHLORIDE SCH MG: 100 TABLET, FILM COATED ORAL at 21:53

## 2021-10-19 RX ADMIN — NIFEDIPINE SCH: 90 TABLET, EXTENDED RELEASE ORAL at 10:00

## 2021-10-19 RX ADMIN — HEPARIN SODIUM SCH UNIT: 5000 INJECTION, SOLUTION INTRAVENOUS; SUBCUTANEOUS at 21:53

## 2021-10-19 RX ADMIN — HYDRALAZINE HYDROCHLORIDE SCH MG: 100 TABLET, FILM COATED ORAL at 14:00

## 2021-10-19 RX ADMIN — OXYCODONE AND ACETAMINOPHEN PRN TAB: 5; 325 TABLET ORAL at 21:52

## 2021-10-19 RX ADMIN — HEPARIN SODIUM SCH UNIT: 5000 INJECTION, SOLUTION INTRAVENOUS; SUBCUTANEOUS at 05:21

## 2021-10-19 RX ADMIN — HEPARIN SODIUM SCH: 5000 INJECTION, SOLUTION INTRAVENOUS; SUBCUTANEOUS at 14:00

## 2021-10-19 RX ADMIN — HYDRALAZINE HYDROCHLORIDE SCH: 100 TABLET, FILM COATED ORAL at 08:00

## 2021-10-19 RX ADMIN — Medication SCH: at 10:00

## 2021-10-19 RX ADMIN — MORPHINE SULFATE PRN MG: 4 INJECTION, SOLUTION INTRAMUSCULAR; INTRAVENOUS at 15:34

## 2021-10-19 RX ADMIN — Medication SCH ML: at 21:54

## 2021-10-19 NOTE — OPERATIVE REPORT
Operative Report


Operative Report: 





Date of Procedure: 10/19/2021





Pre-operative Diagnosis: Complications of Dialysis Access





Post-operative Diagnosis: Same





Procedure(s):


1.  Revision of Left Arm Arteriovenous Fistula with Excision of Pseudoaneurysms 

and Repair with Interposition 7 mm Bovine Artegraft





Surgeon: Carlos Womack M.D. 





Assistant: None





Anesthesia: General Endotracheal Anesthesia





EBL: 150 mL





Counts: Correct





Complications: None





Condition: Stable





Findings: There was a palpable thrill in the arteriovenous graft at the complet

ion of the case





Specimen: Left arm arteriovenous fistula pseudoaneurysms were sent to pathology.





Indication:





The patient is a 51-year-old male with a history of end-stage renal disease who 

is on hemodialysis through left arm arteriovenous access.  He presented to the 

hospital with 2 moderately sized pseudoaneurysms, in the cannulation zone of the

access, with thin shiny skin overlying the pseudoaneurysms.  He is in need of 

revision of his arteriovenous access to prevent ulceration and hemorrhage from 

the pseudoaneurysms.  He was given the risk, benefits, and alternative 

procedures and consented to the procedure.





Description of Procedure:





The patient was brought to the operating room and laid in supine position.  

After a timeout was performed general endotracheal anesthesia was achieved and 

his left arm was prepped and draped in normal sterile fashion.  A longitudinal 

incision was created on the medial aspect of the fistula extending from the 

upper arm just distal to the areas of pseudoaneurysm to the lower arm just 

proximal to the pseudoaneurysms.  Cautery was used to carry the incision down to

the pseudoaneurysms and sharp dissection with Metzenbaum scissors was used to 

dissect circumferentially around the arterial inflow, in a normal caliber of the

fistula.  This was controlled with a vessel loop and then sharp dissection was 

used to dissect circumferentially around the normal portion of the venous 

outflow of the fistula and this was controlled with a vessel loop.  I then used 

the Metzenbaum scissors to dissect circumferentially around the areas of 

pseudoaneurysm into the entire length of the damaged portion of the fistula was 

dissected free.  I then clamped the arterial inflow as well as the venous 

outflow of the fistula using angled DeBakey clamps and the use curved Mayos to 

transect the pseudoaneurysm free and passed this off as a specimen.  I then used

a Reina-Wick tunneler to tunnel from the arterial inflow of the fistula towards 

the venous outflow of the fistula along the lateral aspect of the incision.  I 

used a 7 mm Bovine Artegraft that had been soaked in Rifampin and tied this to 

the Reina-Wiyousif tunneler using a 2-0 silk tie.  I pulled this back through the 

tunnel and then infused with heparinized saline to ensure that it had not kinked

or twisted.  At this point I systemically heparinized the patient with 2000 

units of heparin IV.  I then beveled the arterial inflow of the fistula as well 

as the graft and created an end-to-end anastomosis using two 5-0 Prolene's in 

running fashion.  After completing the anastomosis I clamped the graft just 

distal to the anastomosis with air removed and cleared from the arterial inflow 

of the fistula to ensure hemostasis on the suture line.  I then cut the venous 

outflow portion of the graft to length and beveled the end.  I beveled the 

venous outflow end of the fistula and then created an end-to-end anastomosis 

using two 5-0 Prolene's in running fashion.  Prior to completing the anastomosis

I flashed the venous outflow of the fistula as well as the arterial inflow of 

the fistula and reclamped both vessels.  I then flushed the venotomy with 

heparinized saline to do clear any debris.  I completed the anastomosis and then

released all clamps allowing flow to the graft which had a palpable thrill.  

Hemostasis within the wound was then achieved with cautery.  I then created a 

subcutaneous pocket along the medial aspect of the incision to create a flap and

then resected the skin below the lateral incision which contain the previously 

ulcerated portion that overlie the pseudoaneurysms.  I then anesthetized the 

skin and soft tissue using 0.5% Marcaine.  I then closed the wound in 2 layers 

using 3-0 Vicryl in running fashion the deep dermal layer and 4-0 Monocryl in 

running fashion subcuticular layer.  The wound was then dressed with Dermabond, 

Telfa, Kerlix, and a 3 inch Ace bandage used to the create pressure and prevent 

hematoma formation.  The patient tolerated the procedure well.  All sponge, 

needle, and instrument counts were correct.  The patient was taken to the 

recovery area in stable condition.

## 2021-10-19 NOTE — PROGRESS NOTE
Assessment and Plan


Impression


* End-stage renal disease on maintenance hemodialysis


* Severe anemia


* Uncontrolled hypertension


* AV fistula with compromised overlying skin


* Hypercalcemia





Recommendations


* S/p permcath placement 10/18


* Continue HD MWF


* Vascular surgery notes reviewed. S/p left AVF revision with pseudoaneurysm 

  excision and repair today


* Patient is status post 1 unit packed RBC transfusion.  Recommend GI 

  evaluation.  


* Serum calcium is better.  PTH level is elevated at 1330.  Added Sensipar 


* Epogen with HD


* Binders with meals


* Adjust diet and meds for ESRD state


* No IV, BP or venipuncture at access site








Subjective


Date of service: 10/19/21


Principal diagnosis: Anemia


Interval history: 


S/p left AVF revision with pseudoaneurysm excision and repair today








Objective





- Exam


Narrative Exam: 


General: No acute distress


HEENT: Oral mucosa moist


Neck: Supple, no JVD


Chest: Clear to auscultation bilaterally


Heart: RRR, S1 and S2, no pericardial rub


Abdomen: Soft, nontender, no renal bruit


Extremity: No peripheral cyanosis, edema


Neurological: Alert, awake, no asterixis


Dermatology: No skin rash


Psych: No agitation


Musculoskeletal: No joint effusion








- Vital Signs


Vital signs: 


                               Vital Signs - 12hr











  10/19/21 10/19/21 10/19/21





  07:42 11:56 12:01


 


Temperature  100.9 F H 


 


Pulse Rate  69 70


 


Respiratory  12 12





Rate   


 


Blood Pressure  109/65 111/64


 


O2 Sat by Pulse 98 99 100





Oximetry   














  10/19/21 10/19/21 10/19/21





  12:06 12:11 12:26


 


Temperature   98.1 F


 


Pulse Rate 71 68 73


 


Respiratory 12 12 16





Rate   


 


Blood Pressure 109/64 111/63 116/70


 


O2 Sat by Pulse 100 100 100





Oximetry   














- Lab





                                 10/19/21 05:45





                                 10/19/21 05:45


                             Most recent lab results











Calcium  9.0 mg/dL (8.4-10.2)   10/19/21  05:45    


 


Phosphorus  3.80 mg/dL (2.5-4.5)   10/17/21  07:23    














Medications & Allergies





- Medications


Allergies/Adverse Reactions: 


                                    Allergies





No Known Allergies Allergy (Verified 08/18/21 10:37)


   








Home Medications: 


                                Home Medications











 Medication  Instructions  Recorded  Confirmed  Last Taken  Type


 


Epoetin Amadeo 20,000 Unit [Procrit] 20,000 unit SUB-Q Sa  vial 03/20/20 10/15/21 

Unknown Rx


 


Epoetin Amadeo 20,000 Unit [Procrit] 20,000 unit SUB-Q Th  vial 03/20/20 10/15/21 

Unknown Rx


 


Epoetin Amadeo 20,000 Unit [Procrit] 20,000 unit SUB-Q Tu  vial 03/20/20 10/15/21 

Unknown Rx


 


NIFEdipine [Nifedipine ER] 90 mg PO QDAY #30 08/19/21 10/15/21 Unknown Rx


 


Cinacalcet [Sensipar] 30 mg PO QDAY  tablet 09/16/21 10/15/21 Unknown Rx


 


Epoetin Amadeo-Epbx 10,000 Unit 10,000 unit IV QUITA PRN  vial 09/16/21 10/15/21 

Unknown Rx





[Retacrit]     


 


Furosemide [Lasix TAB] 80 mg PO QDAY #30 09/16/21 10/15/21 Unknown Rx


 


Furosemide [Lasix TAB] 80 mg PO QDAY 30 Days #30 tablet 09/16/21 10/15/21 Unk

nown Rx


 


NIFEdipine XL [Procardia Xl] 90 mg PO QDAY  tablet 09/16/21 10/15/21 Unknown Rx


 


Pantoprazole [Protonix TAB] 40 mg PO QDAC  tablet 09/16/21 10/15/21 Unknown Rx


 


carvediloL [Coreg] 25 mg PO QDAY  tablet 09/16/21 10/15/21 Unknown Rx


 


cloNIDine [Catapres] 0.1 mg PO BID  tablet 09/16/21 10/15/21 Unknown Rx


 


hydrALAZINE [Apresoline TAB] 100 mg PO TID  tab 09/16/21 10/15/21 Unknown Rx











Active Medications: 














Generic Name Dose Route Start Last Admin





  Trade Name Freq  PRN Reason Stop Dose Admin


 


Acetaminophen  650 mg  10/15/21 11:00 





  Acetaminophen 325 Mg Tab  PO  





  Q4H PRN  





  Pain MILD(1-3)/Fever >100.5/HA  


 


Carvedilol  25 mg  10/15/21 13:00  10/18/21 22:10





  Carvedilol 25 Mg Tab  PO   25 mg





  BID ARRON   Administration


 


Cinacalcet  30 mg  10/18/21 10:00  10/18/21 16:03





  Cinacalcet 30 Mg Tab  PO   30 mg





  QDAY ARRON   Administration


 


Clonidine HCl  0.1 mg  10/15/21 13:00  10/18/21 22:08





  Clonidine 0.1 Mg Tab  PO   0.1 mg





  Q12HR ARRON   Administration


 


Heparin Sodium (Porcine)  5,000 unit  10/16/21 08:00  10/19/21 14:00





  Heparin 5,000 Unit/1 Ml Vial  SUB-Q   Not Given





  Q8HR ARRON  


 


Hydralazine HCl  100 mg  10/15/21 14:00  10/19/21 08:00





  Hydralazine 100 Mg Tab  PO   Not Given





  TID ARRON  


 


Hydromorphone HCl  0.25 mg  10/19/21 09:18 





  Hydromorphone 1 Mg/1 Ml Inj  IV  10/19/21 20:00 





  Q10MIN PRN  





  Pain, Moderate (4-6)  


 


Hydromorphone HCl  0.5 mg  10/19/21 09:18 





  Hydromorphone 1 Mg/1 Ml Inj  IV  10/20/21 20:00 





  Q10MIN PRN  





  Pain , Severe (7-10)  


 


Morphine Sulfate  4 mg  10/15/21 10:36  10/19/21 15:34





  Morphine 4 Mg/1 Ml Inj  IV   4 mg





  Q4H PRN   Administration





  Pain , Severe (7-10)  


 


Naloxone HCl  0.1 mg  10/15/21 10:36 





  Naloxone 0.4 Mg/1 Ml Inj  IV  





  Q2MIN PRN  





  Res Rate </= 8 or 02 SAT < 92%  


 


Nifedipine  90 mg  10/15/21 13:00  10/18/21 16:03





  Nifedipine Xl 90 Mg Tab  PO   90 mg





  QDAY ARRON   Administration


 


Ondansetron HCl  4 mg  10/15/21 11:00 





  Ondansetron 4 Mg/2 Ml Inj  IV  





  Q8H PRN  





  Nausea And Vomiting  


 


Ondansetron HCl  4 mg  10/19/21 09:18 





  Ondansetron 4 Mg/2 Ml Inj  IV  10/19/21 18:00 





  ONCE PRN  





  Nausea And Vomiting  


 


Oxycodone/Acetaminophen  1 tab  10/15/21 10:36  10/15/21 20:24





  Oxycodone /Acetaminophen 5-325mg Tab  PO   1 tab





  Q6H PRN   Administration





  Pain, Moderate (4-6)  


 


Sodium Chloride  10 ml  10/15/21 22:00  10/18/21 22:00





  Sodium Chloride 0.9% 10 Ml Flush Syringe  IV   10 ml





  BID ARRON   Administration


 


Sodium Chloride  10 ml  10/15/21 10:36 





  Sodium Chloride 0.9% 10 Ml Flush Syringe  IV  





  PRN PRN  





  LINE FLUSH

## 2021-10-20 LAB
BASOPHILS # (AUTO): 0 K/MM3 (ref 0–0.1)
BASOPHILS NFR BLD AUTO: 0.5 % (ref 0–1.8)
BUN SERPL-MCNC: 40 MG/DL (ref 9–20)
BUN/CREAT SERPL: 5 %
CALCIUM SERPL-MCNC: 9.1 MG/DL (ref 8.4–10.2)
EOSINOPHIL # BLD AUTO: 0.4 K/MM3 (ref 0–0.4)
EOSINOPHIL NFR BLD AUTO: 6.3 % (ref 0–4.3)
HCT VFR BLD CALC: 22.9 % (ref 35.5–45.6)
HEMOLYSIS INDEX: 0
HGB BLD-MCNC: 7.3 GM/DL (ref 11.8–15.2)
LYMPHOCYTES # BLD AUTO: 0.6 K/MM3 (ref 1.2–5.4)
LYMPHOCYTES NFR BLD AUTO: 9.3 % (ref 13.4–35)
MCHC RBC AUTO-ENTMCNC: 32 % (ref 32–34)
MCV RBC AUTO: 96 FL (ref 84–94)
MONOCYTES # (AUTO): 0.8 K/MM3 (ref 0–0.8)
MONOCYTES % (AUTO): 11.2 % (ref 0–7.3)
PLATELET # BLD: 185 K/MM3 (ref 140–440)
RBC # BLD AUTO: 2.4 M/MM3 (ref 3.65–5.03)

## 2021-10-20 PROCEDURE — 5A1D70Z PERFORMANCE OF URINARY FILTRATION, INTERMITTENT, LESS THAN 6 HOURS PER DAY: ICD-10-PCS | Performed by: STUDENT IN AN ORGANIZED HEALTH CARE EDUCATION/TRAINING PROGRAM

## 2021-10-20 RX ADMIN — HYDRALAZINE HYDROCHLORIDE SCH MG: 100 TABLET, FILM COATED ORAL at 15:24

## 2021-10-20 RX ADMIN — Medication SCH ML: at 10:27

## 2021-10-20 RX ADMIN — CINACALCET HYDROCHLORIDE SCH MG: 30 TABLET, FILM COATED ORAL at 15:23

## 2021-10-20 RX ADMIN — NIFEDIPINE SCH MG: 90 TABLET, EXTENDED RELEASE ORAL at 15:24

## 2021-10-20 RX ADMIN — HEPARIN SODIUM SCH UNIT: 5000 INJECTION, SOLUTION INTRAVENOUS; SUBCUTANEOUS at 21:20

## 2021-10-20 RX ADMIN — HYDRALAZINE HYDROCHLORIDE SCH: 100 TABLET, FILM COATED ORAL at 08:26

## 2021-10-20 RX ADMIN — HYDRALAZINE HYDROCHLORIDE SCH MG: 100 TABLET, FILM COATED ORAL at 21:19

## 2021-10-20 RX ADMIN — OXYCODONE AND ACETAMINOPHEN PRN TAB: 5; 325 TABLET ORAL at 15:23

## 2021-10-20 RX ADMIN — Medication SCH ML: at 21:20

## 2021-10-20 RX ADMIN — HEPARIN SODIUM SCH UNIT: 5000 INJECTION, SOLUTION INTRAVENOUS; SUBCUTANEOUS at 05:30

## 2021-10-20 RX ADMIN — HEPARIN SODIUM SCH UNIT: 5000 INJECTION, SOLUTION INTRAVENOUS; SUBCUTANEOUS at 15:27

## 2021-10-20 NOTE — PROGRESS NOTE
Assessment and Plan


Assessment and plan: 





51-year-old male history of ESRD on HD, anemia of chronic disease and medication

noncompliance who presented with complaint of decreased urinary output x3 days. 

Found to be anemic.  Dialyzed while inpatient.  Currently stable.





#ESRD on HD


-Nephrology consulted, HD MWF


-Vas-Cath placed in upper chest (10/18/2021)


-Dialysis today


-decreased urinary output could be consequence of loss of kidney function





#AV fistula defect


-Revision of left AV fistula (10/19/2021); currently POD 1


-Vascular surgery managing


-Pending surgical recommendations on final dispo





#Hypertension


#Malignant hypertension


-patient with severely elevated blood pressure during HD sessions


-BP improved with restarting meds


-continue hydralazine, clonidine, coreg, nifedipine at home doses


-Likely secondary to medication noncompliance, will adjust as necessary





#Acute on anemia of chronic disease


-s/p 1 pack pRBCs 10/15


-Maintain hemoglobin greater than 7


-Likely secondary to ESRD


-Epogen with dialysis





#Paroxysmal atrial fibrillation


-NSR, will continue to monitor





#History of medication noncompliance


-patient admits to not taking prescription since last hospital discharge


-counseled about importance of medication compliance


Disposition Plan: Continue medical management


Total Time Spent with Patient (Minutes): 30 minutes





History


Interval history: 





Patient underwent revision of his AV fistula of his right upper extremity.  

Patient tolerated the procedure well.





Hospitalist Physical





- Constitutional


Vitals: 


                                        











Temp Pulse Resp BP Pulse Ox


 


 98.1 F   103 H  18   123/70   99 


 


 10/20/21 09:56  10/20/21 13:31  10/20/21 09:56  10/20/21 13:31  10/20/21 09:56











General appearance: Present: no acute distress





- EENT


Eyes: Present: PERRL, EOM intact


ENT: hearing intact, clear oral mucosa, dentition normal





- Neck


Neck: Present: supple, normal ROM





- Respiratory


Respiratory effort: normal





- Cardiovascular


Rhythm: regular


Heart Sounds: Present: S1 & S2


Details: 





Permacath in right upper chest





- Extremities


Extremities: no ischemia, pulses intact, pulses symmetrical, normal temperature,

normal color


Extremity abnormal: tenderness (Appropriate tenderness of recently repaired AV 

fistula of left upper extremity), other


Peripheral Pulses: within normal limits





- Abdominal


General gastrointestinal: soft, non-tender, non-distended, normal bowel sounds





- Integumentary


Integumentary: Present: clear, warm, dry





- Psychiatric


Psychiatric: appropriate mood/affect, intact judgment & insight, memory intact, 

cooperative





- Neurologic


Neurologic: CNII-XII intact, moves all extremities





- Allied Health


Allied health notes reviewed: nursing





Results





- Labs


CBC & Chem 7: 


                                 10/20/21 05:29





                                 10/20/21 05:29


Labs: 


                             Laboratory Last Values











WBC  6.8 K/mm3 (4.5-11.0)   10/20/21  05:29    


 


RBC  2.40 M/mm3 (3.65-5.03)  L  10/20/21  05:29    


 


Hgb  7.3 gm/dl (11.8-15.2)  L  10/20/21  05:29    


 


Hct  22.9 % (35.5-45.6)  L  10/20/21  05:29    


 


MCV  96 fl (84-94)  H  10/20/21  05:29    


 


MCH  31 pg (28-32)   10/20/21  05:29    


 


MCHC  32 % (32-34)   10/20/21  05:29    


 


RDW  18.2 % (13.2-15.2)  H  10/20/21  05:29    


 


Plt Count  185 K/mm3 (140-440)   10/20/21  05:29    


 


Lymph % (Auto)  9.3 % (13.4-35.0)  L  10/20/21  05:29    


 


Mono % (Auto)  11.2 % (0.0-7.3)  H  10/20/21  05:29    


 


Eos % (Auto)  6.3 % (0.0-4.3)  H  10/20/21  05:29    


 


Baso % (Auto)  0.5 % (0.0-1.8)   10/20/21  05:29    


 


Lymph # (Auto)  0.6 K/mm3 (1.2-5.4)  L  10/20/21  05:29    


 


Mono # (Auto)  0.8 K/mm3 (0.0-0.8)   10/20/21  05:29    


 


Eos # (Auto)  0.4 K/mm3 (0.0-0.4)   10/20/21  05:29    


 


Baso # (Auto)  0.0 K/mm3 (0.0-0.1)   10/20/21  05:29    


 


Add Manual Diff  Complete   10/17/21  07:23    


 


Total Counted  100   10/17/21  07:23    


 


Seg Neutrophils %  72.7 % (40.0-70.0)  H  10/20/21  05:29    


 


Seg Neuts % (Manual)  85.0 % (40.0-70.0)  H  10/17/21  07:23    


 


Lymphocytes % (Manual)  4.0 % (13.4-35.0)  L  10/17/21  07:23    


 


Monocytes % (Manual)  5.0 % (0.0-7.3)   10/17/21  07:23    


 


Eosinophils % (Manual)  6.0 % (0.0-4.3)  H  10/17/21  07:23    


 


Nucleated RBC %  Not Reportable   10/17/21  07:23    


 


Seg Neutrophils #  5.0 K/mm3 (1.8-7.7)   10/20/21  05:29    


 


Seg Neutrophils # Man  5.6 K/mm3 (1.8-7.7)   10/17/21  07:23    


 


Band Neutrophils #  0.0 K/mm3  10/17/21  07:23    


 


Lymphocytes # (Manual)  0.3 K/mm3 (1.2-5.4)  L  10/17/21  07:23    


 


Abs React Lymphs (Man)  0.0 K/mm3  10/17/21  07:23    


 


Monocytes # (Manual)  0.3 K/mm3 (0.0-0.8)   10/17/21  07:23    


 


Eosinophils # (Manual)  0.4 K/mm3 (0.0-0.4)   10/17/21  07:23    


 


Basophils # (Manual)  0.0 K/mm3 (0.0-0.1)   10/17/21  07:23    


 


Metamyelocytes #  0.0 K/mm3  10/17/21  07:23    


 


Myelocytes #  0.0 K/mm3  10/17/21  07:23    


 


Promyelocytes #  0.0 K/mm3  10/17/21  07:23    


 


Blast Cells #  0.0 K/mm3  10/17/21  07:23    


 


WBC Morphology  Not Reportable   10/17/21  07:23    


 


Hypersegmented Neuts  Not Reportable   10/17/21  07:23    


 


Hyposegmented Neuts  Not Reportable   10/17/21  07:23    


 


Hypogranular Neuts  Not Reportable   10/17/21  07:23    


 


Smudge Cells  Not Reportable   10/17/21  07:23    


 


Toxic Granulation  Not Reportable   10/17/21  07:23    


 


Toxic Vacuolation  Not Reportable   10/17/21  07:23    


 


Dohle Bodies  Not Reportable   10/17/21  07:23    


 


Pelger-Huet Anomaly  Not Reportable   10/17/21  07:23    


 


Nel Rods  Not Reportable   10/17/21  07:23    


 


Platelet Estimate  Consistent w auto   10/17/21  07:23    


 


Clumped Platelets  Not Reportable   10/17/21  07:23    


 


Plt Clumps, EDTA  Not Reportable   10/17/21  07:23    


 


Large Platelets  Not Reportable   10/17/21  07:23    


 


Giant Platelets  Not Reportable   10/17/21  07:23    


 


Platelet Satelliting  Not Reportable   10/17/21  07:23    


 


Plt Morphology Comment  Not Reportable   10/17/21  07:23    


 


RBC Morphology  Not Reportable   10/17/21  07:23    


 


Dimorphic RBCs  Not Reportable   10/17/21  07:23    


 


Polychromasia  Not Reportable   10/17/21  07:23    


 


Hypochromasia  Not Reportable   10/17/21  07:23    


 


Poikilocytosis  2+   10/17/21  07:23    


 


Anisocytosis  2+   10/17/21  07:23    


 


Microcytosis  Not Reportable   10/17/21  07:23    


 


Macrocytosis  Not Reportable   10/17/21  07:23    


 


Spherocytes  Not Reportable   10/17/21  07:23    


 


Pappenheimer Bodies  Not Reportable   10/17/21  07:23    


 


Sickle Cells  Not Reportable   10/17/21  07:23    


 


Target Cells  Not Reportable   10/17/21  07:23    


 


Tear Drop Cells  Not Reportable   10/17/21  07:23    


 


Ovalocytes  Few   10/17/21  07:23    


 


Helmet Cells  Rare   10/17/21  07:23    


 


[place] Bodies  Not Reportable   10/17/21  07:23    


 


Cabot Rings  Not Reportable   10/17/21  07:23    


 


[place] Cells  1+   10/17/21  07:23    


 


Bite Cells  Not Reportable   10/17/21  07:23    


 


Crenated Cell  Not Reportable   10/17/21  07:23    


 


Elliptocytes  Few   10/17/21  07:23    


 


Acanthocytes (Spur)  Not Reportable   10/17/21  07:23    


 


Rouleaux  Not Reportable   10/17/21  07:23    


 


Hemoglobin C Crystals  Not Reportable   10/17/21  07:23    


 


Schistocytes  Rare   10/17/21  07:23    


 


Malaria parasites  Not Reportable   10/17/21  07:23    


 


Behzad Bodies  Not Reportable   10/17/21  07:23    


 


Hem Pathologist Commnt  No   10/17/21  07:23    


 


Sodium  133 mmol/L (137-145)  L  10/20/21  05:29    


 


Potassium  4.2 mmol/L (3.6-5.0)   10/20/21  05:29    


 


Chloride  94.0 mmol/L ()  L  10/20/21  05:29    


 


Carbon Dioxide  26 mmol/L (22-30)   10/20/21  05:29    


 


Anion Gap  17 mmol/L  10/20/21  05:29    


 


BUN  40 mg/dL (9-20)  H  10/20/21  05:29    


 


Creatinine  7.8 mg/dL (0.8-1.3)  H  10/20/21  05:29    


 


Estimated GFR  9 ml/min  10/20/21  05:29    


 


BUN/Creatinine Ratio  5 %  10/20/21  05:29    


 


Glucose  87 mg/dL ()   10/20/21  05:29    


 


Calcium  9.1 mg/dL (8.4-10.2)   10/20/21  05:29    


 


Phosphorus  4.40 mg/dL (2.5-4.5)   10/20/21  05:29    


 


Magnesium  1.60 mg/dL (1.7-2.3)  L  10/20/21  05:29    


 


PTH Intact  1332 pg/mL (15-65)  H  10/17/21  07:23    


 


Nasal Screen MRSA (PCR)  Negative  (Negative)   10/16/21  Unknown


 


Hepatitis A IgM Ab  Non-reactive  (NonReactive)   10/15/21  09:35    


 


Hep Bs Antigen  Nonreactive  (Negative)   10/15/21  09:35    


 


Hep B Core IgM Ab  Non-reactive  (NonReactive)   10/15/21  09:35    


 


Hepatitis C Antibody  Non-reactive  (NonReactive)   10/15/21  09:35    


 


Blood Type  O POSITIVE   10/15/21  10:30    


 


Antibody Screen  Negative   10/15/21  10:30    


 


Crossmatch  See Detail   10/15/21  10:30    











Mcmahon/IV: 


                                        





Voiding Method                   Toilet











Active Medications





- Current Medications


Current Medications: 














Generic Name Dose Route Start Last Admin





  Trade Name Freq  PRN Reason Stop Dose Admin


 


Acetaminophen  650 mg  10/15/21 11:00 





  Acetaminophen 325 Mg Tab  PO  





  Q4H PRN  





  Pain MILD(1-3)/Fever >100.5/HA  


 


Carvedilol  25 mg  10/15/21 13:00  10/19/21 21:53





  Carvedilol 25 Mg Tab  PO   25 mg





  BID ARRON   Administration


 


Cinacalcet  30 mg  10/18/21 10:00  10/19/21 10:00





  Cinacalcet 30 Mg Tab  PO   Not Given





  QDAY [facility]  


 


Clonidine HCl  0.1 mg  10/15/21 13:00  10/19/21 21:53





  Clonidine 0.1 Mg Tab  PO   0.1 mg





  Q12HR ARRON   Administration


 


Heparin Sodium (Porcine)  5,000 unit  10/16/21 08:00  10/20/21 05:30





  Heparin 5,000 Unit/1 Ml Vial  SUB-Q   5,000 unit





  Q8HR ARRON   Administration


 


Hydralazine HCl  100 mg  10/15/21 14:00  10/19/21 21:53





  Hydralazine 100 Mg Tab  PO   100 mg





  TID ARRON   Administration


 


Hydromorphone HCl  0.5 mg  10/19/21 09:18 





  Hydromorphone 1 Mg/1 Ml Inj  IV  10/20/21 20:00 





  Q10MIN PRN  





  Pain , Severe (7-10)  


 


Morphine Sulfate  4 mg  10/15/21 10:36  10/19/21 15:34





  Morphine 4 Mg/1 Ml Inj  IV   4 mg





  Q4H PRN   Administration





  Pain , Severe (7-10)  


 


Naloxone HCl  0.1 mg  10/15/21 10:36 





  Naloxone 0.4 Mg/1 Ml Inj  IV  





  Q2MIN PRN  





  Res Rate </= 8 or 02 SAT < 92%  


 


Nifedipine  90 mg  10/15/21 13:00  10/19/21 10:00





  Nifedipine Xl 90 Mg Tab  PO   Not Given





  QDAY [facility]  


 


Ondansetron HCl  4 mg  10/15/21 11:00 





  Ondansetron 4 Mg/2 Ml Inj  IV  





  Q8H PRN  





  Nausea And Vomiting  


 


Oxycodone/Acetaminophen  1 tab  10/15/21 10:36  10/19/21 21:52





  Oxycodone /Acetaminophen 5-325mg Tab  PO   1 tab





  Q6H PRN   Administration





  Pain, Moderate (4-6)  


 


Sodium Chloride  10 ml  10/15/21 22:00  10/19/21 21:54





  Sodium Chloride 0.9% 10 Ml Flush Syringe  IV   10 ml





  BID ARRON   Administration


 


Sodium Chloride  10 ml  10/15/21 10:36 





  Sodium Chloride 0.9% 10 Ml Flush Syringe  IV  





  PRN PRN  





  LINE FLUSH

## 2021-10-20 NOTE — PROGRESS NOTE
Assessment and Plan


Impression


* End-stage renal disease on maintenance hemodialysis


* Severe anemia


* Uncontrolled hypertension


* AV fistula with compromised overlying skin


* Hypercalcemia





Recommendations


* S/p permcath placement 10/18


* Continue HD MWF


* Vascular surgery notes reviewed. S/p left AVF revision with pseudoaneurysm 

  excision and repair 10/19


* Patient is status post 1 unit packed RBC transfusion.  Recommend GI 

  evaluation.  


* Serum calcium is better.  PTH level is elevated at 1330.  Added Sensipar 


* Epogen with HD


* Binders with meals


* Adjust diet and meds for ESRD state


* No IV, BP or venipuncture at access site








Subjective


Date of service: 10/20/21


Principal diagnosis: Anemia


Interval history: 


S/p left AVF revision with pseudoaneurysm excision and repair - POD 1


Seen during HD








Objective





- Exam


Narrative Exam: 


General: No acute distress


HEENT: Oral mucosa moist


Neck: Supple, no JVD


Chest: Clear to auscultation bilaterally


Heart: RRR, S1 and S2, no pericardial rub


Abdomen: Soft, nontender, no renal bruit


Extremity: No peripheral cyanosis, edema


Neurological: Alert, awake, no asterixis


Dermatology: No skin rash


Psych: No agitation


Musculoskeletal: No joint effusion








- Vital Signs


Vital signs: 


                               Vital Signs - 12hr











  10/20/21 10/20/21 10/20/21





  00:51 05:40 09:56


 


Temperature  98.6 F 98.1 F


 


Pulse Rate  87 84


 


Respiratory  18 18





Rate   


 


Blood Pressure  159/79 186/91


 


O2 Sat by Pulse 95 94 





Oximetry   


 


O2 Sat by Pulse   99





Oximetry [   





Anterior   





Bilateral   





Throughout]   


 


O2 Sat by Pulse   99





Oximetry [   





Posterior   





Bilateral   





Throughout]   


 


O2 Sat by Pulse   99





Oximetry [   





Posterior   





Bilateral]   














  10/20/21 10/20/21 10/20/21





  10:09 10:15 10:30


 


Temperature   


 


Pulse Rate 85 85 90


 


Respiratory   





Rate   


 


Blood Pressure 179/97 178/97 177/95


 


O2 Sat by Pulse   





Oximetry   


 


O2 Sat by Pulse   





Oximetry [   





Anterior   





Bilateral   





Throughout]   


 


O2 Sat by Pulse   





Oximetry [   





Posterior   





Bilateral   





Throughout]   


 


O2 Sat by Pulse   





Oximetry [   





Posterior   





Bilateral]   














  10/20/21 10/20/21 10/20/21





  10:45 11:00 11:15


 


Temperature   


 


Pulse Rate 84 85 85


 


Respiratory   





Rate   


 


Blood Pressure 175/95 182/100 183/97


 


O2 Sat by Pulse   





Oximetry   


 


O2 Sat by Pulse   





Oximetry [   





Anterior   





Bilateral   





Throughout]   


 


O2 Sat by Pulse   





Oximetry [   





Posterior   





Bilateral   





Throughout]   


 


O2 Sat by Pulse   





Oximetry [   





Posterior   





Bilateral]   














- Lab





                                 10/20/21 05:29





                                 10/20/21 05:29


                             Most recent lab results











Calcium  9.1 mg/dL (8.4-10.2)   10/20/21  05:29    


 


Phosphorus  4.40 mg/dL (2.5-4.5)   10/20/21  05:29    


 


Magnesium  1.60 mg/dL (1.7-2.3)  L  10/20/21  05:29    














Medications & Allergies





- Medications


Allergies/Adverse Reactions: 


                                    Allergies





No Known Allergies Allergy (Verified 08/18/21 10:37)


   








Home Medications: 


                                Home Medications











 Medication  Instructions  Recorded  Confirmed  Last Taken  Type


 


Epoetin Amadeo 20,000 Unit [Procrit] 20,000 unit SUB-Q Sa  vial 03/20/20 10/15/21 

Unknown Rx


 


Epoetin Amadeo 20,000 Unit [Procrit] 20,000 unit SUB-Q Th  vial 03/20/20 10/15/21 

Unknown Rx


 


Epoetin Amadeo 20,000 Unit [Procrit] 20,000 unit SUB-Q Tu  vial 03/20/20 10/15/21 

Unknown Rx


 


NIFEdipine [Nifedipine ER] 90 mg PO QDAY #30 08/19/21 10/15/21 Unknown Rx


 


Cinacalcet [Sensipar] 30 mg PO QDAY  tablet 09/16/21 10/15/21 Unknown Rx


 


Epoetin Amadeo-Epbx 10,000 Unit 10,000 unit IV QUITA PRN  vial 09/16/21 10/15/21 

Unknown Rx





[Retacrit]     


 


Furosemide [Lasix TAB] 80 mg PO QDAY #30 09/16/21 10/15/21 Unknown Rx


 


Furosemide [Lasix TAB] 80 mg PO QDAY 30 Days #30 tablet 09/16/21 10/15/21 

Unknown Rx


 


NIFEdipine XL [Procardia Xl] 90 mg PO QDAY  tablet 09/16/21 10/15/21 Unknown Rx


 


Pantoprazole [Protonix TAB] 40 mg PO QDAC  tablet 09/16/21 10/15/21 Unknown Rx


 


carvediloL [Coreg] 25 mg PO QDAY  tablet 09/16/21 10/15/21 Unknown Rx


 


cloNIDine [Catapres] 0.1 mg PO BID  tablet 09/16/21 10/15/21 Unknown Rx


 


hydrALAZINE [Apresoline TAB] 100 mg PO TID  tab 09/16/21 10/15/21 Unknown Rx











Active Medications: 














Generic Name Dose Route Start Last Admin





  Trade Name Freq  PRN Reason Stop Dose Admin


 


Acetaminophen  650 mg  10/15/21 11:00 





  Acetaminophen 325 Mg Tab  PO  





  Q4H PRN  





  Pain MILD(1-3)/Fever >100.5/HA  


 


Carvedilol  25 mg  10/15/21 13:00  10/19/21 21:53





  Carvedilol 25 Mg Tab  PO   25 mg





  BID ARRON   Administration


 


Cinacalcet  30 mg  10/18/21 10:00  10/19/21 10:00





  Cinacalcet 30 Mg Tab  PO   Not Given





  QDAY ARRON  


 


Clonidine HCl  0.1 mg  10/15/21 13:00  10/19/21 21:53





  Clonidine 0.1 Mg Tab  PO   0.1 mg





  Q12HR ARRON   Administration


 


Heparin Sodium (Porcine)  5,000 unit  10/16/21 08:00  10/20/21 05:30





  Heparin 5,000 Unit/1 Ml Vial  SUB-Q   5,000 unit





  Q8HR ARRON   Administration


 


Hydralazine HCl  100 mg  10/15/21 14:00  10/19/21 21:53





  Hydralazine 100 Mg Tab  PO   100 mg





  TID ARRON   Administration


 


Hydromorphone HCl  0.5 mg  10/19/21 09:18 





  Hydromorphone 1 Mg/1 Ml Inj  IV  10/20/21 20:00 





  Q10MIN PRN  





  Pain , Severe (7-10)  


 


Morphine Sulfate  4 mg  10/15/21 10:36  10/19/21 15:34





  Morphine 4 Mg/1 Ml Inj  IV   4 mg





  Q4H PRN   Administration





  Pain , Severe (7-10)  


 


Naloxone HCl  0.1 mg  10/15/21 10:36 





  Naloxone 0.4 Mg/1 Ml Inj  IV  





  Q2MIN PRN  





  Res Rate </= 8 or 02 SAT < 92%  


 


Nifedipine  90 mg  10/15/21 13:00  10/19/21 10:00





  Nifedipine Xl 90 Mg Tab  PO   Not Given





  QDAY [facility]  


 


Ondansetron HCl  4 mg  10/15/21 11:00 





  Ondansetron 4 Mg/2 Ml Inj  IV  





  Q8H PRN  





  Nausea And Vomiting  


 


Oxycodone/Acetaminophen  1 tab  10/15/21 10:36  10/19/21 21:52





  Oxycodone /Acetaminophen 5-325mg Tab  PO   1 tab





  Q6H PRN   Administration





  Pain, Moderate (4-6)  


 


Sodium Chloride  10 ml  10/15/21 22:00  10/19/21 21:54





  Sodium Chloride 0.9% 10 Ml Flush Syringe  IV   10 ml





  BID ARRON   Administration


 


Sodium Chloride  10 ml  10/15/21 10:36 





  Sodium Chloride 0.9% 10 Ml Flush Syringe  IV  





  PRN PRN  





  LINE FLUSH

## 2021-10-21 VITALS — SYSTOLIC BLOOD PRESSURE: 116 MMHG | DIASTOLIC BLOOD PRESSURE: 64 MMHG

## 2021-10-21 LAB
BASOPHILS # (AUTO): 0 K/MM3 (ref 0–0.1)
BASOPHILS NFR BLD AUTO: 0.6 % (ref 0–1.8)
BUN SERPL-MCNC: 22 MG/DL (ref 9–20)
BUN/CREAT SERPL: 4 %
CALCIUM SERPL-MCNC: 9 MG/DL (ref 8.4–10.2)
EOSINOPHIL # BLD AUTO: 0.5 K/MM3 (ref 0–0.4)
EOSINOPHIL NFR BLD AUTO: 8.7 % (ref 0–4.3)
HCT VFR BLD CALC: 20.6 % (ref 35.5–45.6)
HEMOLYSIS INDEX: 1
HGB BLD-MCNC: 6.9 GM/DL (ref 11.8–15.2)
LYMPHOCYTES # BLD AUTO: 0.8 K/MM3 (ref 1.2–5.4)
LYMPHOCYTES NFR BLD AUTO: 13.8 % (ref 13.4–35)
MCHC RBC AUTO-ENTMCNC: 34 % (ref 32–34)
MCV RBC AUTO: 94 FL (ref 84–94)
MONOCYTES # (AUTO): 0.7 K/MM3 (ref 0–0.8)
MONOCYTES % (AUTO): 12 % (ref 0–7.3)
PLATELET # BLD: 167 K/MM3 (ref 140–440)
RBC # BLD AUTO: 2.18 M/MM3 (ref 3.65–5.03)

## 2021-10-21 RX ADMIN — HYDRALAZINE HYDROCHLORIDE SCH MG: 100 TABLET, FILM COATED ORAL at 10:10

## 2021-10-21 RX ADMIN — Medication SCH ML: at 10:10

## 2021-10-21 RX ADMIN — HEPARIN SODIUM SCH UNIT: 5000 INJECTION, SOLUTION INTRAVENOUS; SUBCUTANEOUS at 05:11

## 2021-10-21 RX ADMIN — NIFEDIPINE SCH MG: 90 TABLET, EXTENDED RELEASE ORAL at 10:09

## 2021-10-21 RX ADMIN — HYDRALAZINE HYDROCHLORIDE SCH MG: 100 TABLET, FILM COATED ORAL at 14:08

## 2021-10-21 RX ADMIN — CINACALCET HYDROCHLORIDE SCH MG: 30 TABLET, FILM COATED ORAL at 10:09

## 2021-10-21 RX ADMIN — HEPARIN SODIUM SCH: 5000 INJECTION, SOLUTION INTRAVENOUS; SUBCUTANEOUS at 14:09

## 2021-10-21 NOTE — PROGRESS NOTE
Assessment and Plan


Impression


* End-stage renal disease on maintenance hemodialysis


* Severe anemia


* Uncontrolled hypertension


* AV fistula with compromised overlying skin


* Hypercalcemia





Recommendations


* S/p permcath placement 10/18


* Continue HD MWF


* Vascular surgery notes reviewed. S/p left AVF revision with pseudoaneurysm 

  excision and repair 10/19


* Patient is status post 1 unit packed RBC transfusion.  Recommend GI 

  evaluation.  


* Serum calcium is better.  PTH level is elevated at 1330.  Added Sensipar 


* Epogen with HD


* Binders with meals


* Adjust diet and meds for ESRD state


* No IV, BP or venipuncture at access site








Subjective


Date of service: 10/21/21


Principal diagnosis: Anemia


Interval history: 


S/p left AVF revision with pseudoaneurysm excision and repair - POD 2


Sitting in chair








Objective





- Exam


Narrative Exam: 


General: No acute distress


HEENT: Oral mucosa moist


Neck: Supple, no JVD


Chest: Clear to auscultation bilaterally


Heart: RRR, S1 and S2, no pericardial rub


Abdomen: Soft, nontender, no renal bruit


Extremity: No peripheral cyanosis, edema


Neurological: Alert, awake, no asterixis


Dermatology: No skin rash


Psych: No agitation


Musculoskeletal: No joint effusion








- Vital Signs


Vital signs: 


                               Vital Signs - 12hr











  10/21/21 10/21/21 10/21/21





  05:12 10:10 10:11


 


Temperature 99.0 F  


 


Pulse Rate 85  85


 


Respiratory 19  





Rate   


 


Blood Pressure 122/69 116/64 116/64


 


O2 Sat by Pulse 99  90





Oximetry   














- Lab





                                 10/21/21 07:44





                                 10/21/21 07:44


                             Most recent lab results











Calcium  9.0 mg/dL (8.4-10.2)   10/21/21  07:44    


 


Phosphorus  3.80 mg/dL (2.5-4.5)   10/21/21  07:44    


 


Magnesium  1.50 mg/dL (1.7-2.3)  L  10/21/21  07:44    














Medications & Allergies





- Medications


Allergies/Adverse Reactions: 


                                    Allergies





No Known Allergies Allergy (Verified 08/18/21 10:37)


   








Home Medications: 


                                Home Medications











 Medication  Instructions  Recorded  Confirmed  Last Taken  Type


 


Epoetin Amadeo 20,000 Unit [Procrit] 20,000 unit SUB-Q Sa  vial 03/20/20 10/15/21 

Unknown Rx


 


Epoetin Amadeo 20,000 Unit [Procrit] 20,000 unit SUB-Q Th  vial 03/20/20 10/15/21 

Unknown Rx


 


Epoetin Amadeo 20,000 Unit [Procrit] 20,000 unit SUB-Q Tu  vial 03/20/20 10/15/21 

Unknown Rx


 


NIFEdipine [Nifedipine ER] 90 mg PO QDAY #30 08/19/21 10/15/21 Unknown Rx


 


Cinacalcet [Sensipar] 30 mg PO QDAY  tablet 09/16/21 10/15/21 Unknown Rx


 


Epoetin Amadeo-Epbx 10,000 Unit 10,000 unit IV QUITA PRN  vial 09/16/21 10/15/21 

Unknown Rx





[Retacrit]     


 


Furosemide [Lasix TAB] 80 mg PO QDAY #30 09/16/21 10/15/21 Unknown Rx


 


Furosemide [Lasix TAB] 80 mg PO QDAY 30 Days #30 tablet 09/16/21 10/15/21 

Unknown Rx


 


NIFEdipine XL [Procardia Xl] 90 mg PO QDAY  tablet 09/16/21 10/15/21 Unknown Rx


 


Pantoprazole [Protonix TAB] 40 mg PO QDAC  tablet 09/16/21 10/15/21 Unknown Rx


 


carvediloL [Coreg] 25 mg PO QDAY  tablet 09/16/21 10/15/21 Unknown Rx


 


cloNIDine [Catapres] 0.1 mg PO BID  tablet 09/16/21 10/15/21 Unknown Rx


 


hydrALAZINE [Apresoline TAB] 100 mg PO TID  tab 09/16/21 10/15/21 Unknown Rx











Active Medications: 














Generic Name Dose Route Start Last Admin





  Trade Name Freq  PRN Reason Stop Dose Admin


 


Acetaminophen  650 mg  10/15/21 11:00 





  Acetaminophen 325 Mg Tab  PO  





  Q4H PRN  





  Pain MILD(1-3)/Fever >100.5/HA  


 


Carvedilol  25 mg  10/15/21 13:00  10/21/21 10:10





  Carvedilol 25 Mg Tab  PO   25 mg





  BID ARRON   Administration


 


Cinacalcet  30 mg  10/18/21 10:00  10/21/21 10:09





  Cinacalcet 30 Mg Tab  PO   30 mg





  QDAY ARRON   Administration


 


Clonidine HCl  0.1 mg  10/15/21 13:00  10/21/21 10:10





  Clonidine 0.1 Mg Tab  PO   0.1 mg





  Q12HR ARRON   Administration


 


Heparin Sodium (Porcine)  5,000 unit  10/16/21 08:00  10/21/21 05:11





  Heparin 5,000 Unit/1 Ml Vial  SUB-Q   5,000 unit





  Q8HR ARRON   Administration


 


Hydralazine HCl  100 mg  10/15/21 14:00  10/21/21 10:10





  Hydralazine 100 Mg Tab  PO   100 mg





  TID ARRON   Administration


 


Morphine Sulfate  4 mg  10/15/21 10:36  10/19/21 15:34





  Morphine 4 Mg/1 Ml Inj  IV   4 mg





  Q4H PRN   Administration





  Pain , Severe (7-10)  


 


Naloxone HCl  0.1 mg  10/15/21 10:36 





  Naloxone 0.4 Mg/1 Ml Inj  IV  





  Q2MIN PRN  





  Res Rate </= 8 or 02 SAT < 92%  


 


Nifedipine  90 mg  10/15/21 13:00  10/21/21 10:09





  Nifedipine Xl 90 Mg Tab  PO   90 mg





  QDAY ARRON   Administration


 


Ondansetron HCl  4 mg  10/15/21 11:00 





  Ondansetron 4 Mg/2 Ml Inj  IV  





  Q8H PRN  





  Nausea And Vomiting  


 


Oxycodone/Acetaminophen  1 tab  10/15/21 10:36  10/20/21 15:23





  Oxycodone /Acetaminophen 5-325mg Tab  PO   1 tab





  Q6H PRN   Administration





  Pain, Moderate (4-6)  


 


Sodium Chloride  10 ml  10/15/21 22:00  10/21/21 10:10





  Sodium Chloride 0.9% 10 Ml Flush Syringe  IV   10 ml





  BID ARRON   Administration


 


Sodium Chloride  10 ml  10/15/21 10:36 





  Sodium Chloride 0.9% 10 Ml Flush Syringe  IV  





  PRN PRN  





  LINE FLUSH

## 2021-10-21 NOTE — DISCHARGE SUMMARY
Providers





- Providers


Date of Admission: 


10/16/21 07:23





Date of discharge: 10/21/21


Attending physician: 


FRANKIE THOMPSON MD





                                        





10/15/21 08:12


Consult to Physician [CONS] Routine 


   Comment: 


   Consulting Provider: JANICE VENEGAS


   Physician Instructions: 


   Reason For Exam: ESRD needing dialysis





10/15/21 14:32


Consult to Physician [CONS] Routine 


   Comment: 


   Consulting Provider: MARY BLANKENSHIP


   Physician Instructions: 


   Reason For Exam: evaluate AVF. Compromised overlying skin











Primary care physician: 


PRIMARY CARE MD








Hospitalization


Reason for admission: Oliguria


Condition: Fair


Pertinent studies: 





Reviewed.


Procedures: 





Placement of Vas-Cath: 10/18/2021


Revision of AV fistula of left upper extremity: 10/19/2021


Hospital course: 





Patient is a 51-year-old male with a history of ESRD on hemodialysis (AV fistula

 in left upper extremity), anemia of chronic disease, hypertension, paroxysmal 

A. fib, and history of medication noncompliance who presented for complaints of 

decreased urinary output for approximately 3 days.  The AV fistula was found to 

have pseudoaneurysms requiring excision by vascular surgery.  In the meantime 

the patient underwent placement of Vas-Cath in his right upper chest on 

10/18/2021.  Revision of the AV fistula was performed 10/19/2021.  The patient 

was counseled at length about the importance of medication compliance, and he 

expressed understanding.  The patient has been counseled about utilizing his 

permacath until his AV fistula has been cleared by vascular surgery (will be 

evaluated in outpatient setting).  Patient expressed understanding.  Patient can

 be safely discharged home today.


Disposition: 01 HOME / SELF CARE / HOMELESS


Final Discharge Diagnosis (Prints w/discharge instructions): Malignant 

hypertension; ESRD on hemodialysis; paroxysmal A. fib; revision of 

pseudoaneurysm of AV fistula


Time spent for discharge: 40 minutes





Core Measure Documentation





- Palliative Care


Palliative Care/ Comfort Measures: Not Applicable





- Core Measures


Any of the following diagnoses?: none





- VTE Discharge Requirements


Deep Vein Thrombosis/Pulmonary Embolism Present on Admission: No


Has pt received <5 days of overlap therapy or INR<2.0: No (Not indicated)


Anticoagulant overlap therapy prescribed at discharge: No


Contraindication No Overlap Therapy order at DC: Not Indicated





- Acute MI Discharge Requirements


Aspirin at discharge: No


Reason for no aspirin on DC: Medical contraindication (Not indicated)


ACE/ARB for LVSD if EF <40%: Not Applicable


Reason for no ACE/ARB: Renal impairment


Beta blocker at discharge: Yes


Statin for LDL = or >100 mg/dl on DC: Yes





- Heart Failure Discharge Requirements


ACE/ARB for LVSD if EF <40%: Not Applicable


Reason for no ACE/ARB: Renal impairment


Beta blocker at discharge: Yes





- Stroke Discharge Requirements


Statin for LDL = or >70 mg/dl on DC: Not Applicable


Reason for no statin on DC: Not Indicated


Anticoag for atrial fib/atrial flutter: Not Applicable


Reason for no anticoag for AF/F on DC: Not Indicated


Antithrombotic for ischemic stroke: No


Reason for no antithrombotic on DC: Not Indicated





Exam





- Constitutional


Vitals: 


                                        











Temp Pulse Resp BP Pulse Ox


 


 99.0 F   85   19   116/64   90 


 


 10/21/21 05:12  10/21/21 10:11  10/21/21 05:12  10/21/21 10:11  10/21/21 10:11











General appearance: Present: no acute distress, well-nourished





- EENT


Eyes: Present: PERRL, EOM intact


ENT: hearing intact, clear oral mucosa, dentition normal





- Neck


Neck: Present: supple, normal ROM





- Respiratory


Respiratory effort: normal





- Cardiovascular


Rhythm: regular


Heart Sounds: Present: S1 & S2





- Extremities


Extremities: no ischemia, pulses intact, pulses symmetrical, No edema 

(Appropriate postprocedural edema of left upper extremity), normal temperature, 

normal color, abnormal (AV fistula in left upper extremity wrapped in Ace 

bandage)


Extremity abnormal: tenderness (Appropriate tenderness of postoperative site)


Peripheral Pulses: within normal limits





- Abdominal


General gastrointestinal: Present: soft, non-tender, non-distended, normal bowel

 sounds


Male genitourinary: Present: deferred





- Rectal


Rectal Exam: deferred





- Integumentary


Integumentary: Present: clear, warm, dry





- Musculoskeletal


Musculoskeletal: strength equal bilaterally





- Psychiatric


Psychiatric: appropriate mood/affect, intact judgment & insight, memory intact, 

cooperative





- Neurologic


Neurologic: CNII-XII intact, moves all extremities





- Allied Health


Allied health notes reviewed: nursing





Plan


Activity: no restrictions


Weight Bearing Status: Weight Bear as Tolerated


Diet: renal


Wound: per your surgeon's advice


Care Plan Goals: 


Patient discharging home safely


Assessment: 


Patient was admitted with oliguria and managed for malignant hypertension.  

Patient required revision of pseudoaneurysm of AV fistula of left upper 

extremity (10/19/2021) and underwent Vas-Cath placement (10/18/2021).  Patient 

counseled on medication compliance.  Will follow up with vascular in outpatient 

setting


Follow up with: 


PRIMARY CARE,MD [Primary Care Provider] - 7 Days


AZAR ESPINOZA MD [Staff Physician] - 14 Days (Follow up of pseudoaneurysm 

repair of AV fistula of LUE)


Prescriptions: 


hydrALAZINE [Apresoline TAB] 100 mg PO TID #90 tab


cloNIDine [Catapres] 0.1 mg PO Q12HR #60 tablet


carvediloL [Coreg] 25 mg PO BID #50 tablet


carvediloL [Coreg] 25 mg PO QDAY #30 tablet


NIFEdipine XL [Procardia Xl] 90 mg PO QDAY #30 tablet


Cinacalcet [Sensipar] 30 mg PO QDAY #30 tablet

## 2021-10-21 NOTE — PROGRESS NOTE
Assessment and Plan





Patient is doing well following revision of his AV fistula.  From a vascular 

standpoint, the patient may be discharged home today.  He will follow-up in our 

office for postoperative visit.  Patient will need to have his dialysis 

performed through his PermCath in his right chest wall until cleared by vascular

surgery to use the fistula.





Subjective


Date of service: 10/21/21


Principal diagnosis: Anemia


Interval history: 





Patient is postop day 2 status post revision of his left upper arm AV fistula 

with excision of pseudoaneurysms.  His dressing was removed and replaced.  The 

incision is clean dry and intact.  No significant drainage.  The arm below the 

dressing has a mild amount of edema secondary to the compressive nature of his 

postoperative dressing.  Patient has no complaints of pain.





Objective





- Constitutional


Vitals: 


                               Vital Signs - 12hr











  10/21/21 10/21/21 10/21/21





  05:12 10:10 10:11


 


Temperature 99.0 F  


 


Pulse Rate 85  85


 


Respiratory 19  





Rate   


 


Blood Pressure 122/69 116/64 116/64


 


O2 Sat by Pulse 99  90





Oximetry   











General appearance: Present: no acute distress





- EENT


Eyes: EOM intact


ENT: hearing intact





- Neck


Neck: supple, normal ROM





- Respiratory


Respiratory effort: normal


Extremities: abnormal


Extremity abnormal: edema





- Gastrointestinal


General gastrointestinal: Present: deferred


Rectal Exam: deferred





- Genitourinary


Male genitourinary: deferred





- Neurologic


Neurologic: no focal deficits





- Psychiatric


Psychiatric: appropriate mood/affect, cooperative





- Labs


CBC & Chem 7: 


                                 10/21/21 07:44





                                 10/21/21 07:44


Labs: 


                              Abnormal lab results











  10/21/21 10/21/21 Range/Units





  07:44 07:44 


 


RBC  2.18 L   (3.65-5.03)  M/mm3


 


Hgb  6.9 L   (11.8-15.2)  gm/dl


 


Hct  20.6 L   (35.5-45.6)  %


 


RDW  18.2 H   (13.2-15.2)  %


 


Mono % (Auto)  12.0 H   (0.0-7.3)  %


 


Eos % (Auto)  8.7 H   (0.0-4.3)  %


 


Lymph # (Auto)  0.8 L   (1.2-5.4)  K/mm3


 


Eos # (Auto)  0.5 H   (0.0-0.4)  K/mm3


 


Sodium   134 L  (137-145)  mmol/L


 


Chloride   93.5 L  ()  mmol/L


 


BUN   22 H  (9-20)  mg/dL


 


Creatinine   5.0 H  (0.8-1.3)  mg/dL


 


Magnesium   1.50 L  (1.7-2.3)  mg/dL














Medications & Allergies





- Medications


Allergies/Adverse Reactions: 


                                    Allergies





No Known Allergies Allergy (Verified 08/18/21 10:37)


   








Home Medications: 


                                Home Medications











 Medication  Instructions  Recorded  Confirmed  Last Taken  Type


 


Epoetin Amadeo 20,000 Unit [Procrit] 20,000 unit SUB-Q Sa  vial 03/20/20 10/15/21 

Unknown Rx


 


Epoetin Amadeo 20,000 Unit [Procrit] 20,000 unit SUB-Q Th  vial 03/20/20 10/15/21 

Unknown Rx


 


Epoetin Amadeo 20,000 Unit [Procrit] 20,000 unit SUB-Q Tu  vial 03/20/20 10/15/21 

Unknown Rx


 


NIFEdipine [Nifedipine ER] 90 mg PO QDAY #30 08/19/21 10/15/21 Unknown Rx


 


Cinacalcet [Sensipar] 30 mg PO QDAY  tablet 09/16/21 10/15/21 Unknown Rx


 


Epoetin Amadeo-Epbx 10,000 Unit 10,000 unit IV QUITA PRN  vial 09/16/21 10/15/21 

Unknown Rx





[Retacrit]     


 


Furosemide [Lasix TAB] 80 mg PO QDAY #30 09/16/21 10/15/21 Unknown Rx


 


Furosemide [Lasix TAB] 80 mg PO QDAY 30 Days #30 tablet 09/16/21 10/15/21 

Unknown Rx


 


NIFEdipine XL [Procardia Xl] 90 mg PO QDAY  tablet 09/16/21 10/15/21 Unknown Rx


 


Pantoprazole [Protonix TAB] 40 mg PO QDAC  tablet 09/16/21 10/15/21 Unknown Rx


 


carvediloL [Coreg] 25 mg PO QDAY  tablet 09/16/21 10/15/21 Unknown Rx


 


cloNIDine [Catapres] 0.1 mg PO BID  tablet 09/16/21 10/15/21 Unknown Rx


 


hydrALAZINE [Apresoline TAB] 100 mg PO TID  tab 09/16/21 10/15/21 Unknown Rx











Active Medications: 














Generic Name Dose Route Start Last Admin





  Trade Name Freq  PRN Reason Stop Dose Admin


 


Acetaminophen  650 mg  10/15/21 11:00 





  Acetaminophen 325 Mg Tab  PO  





  Q4H PRN  





  Pain MILD(1-3)/Fever >100.5/HA  


 


Carvedilol  25 mg  10/15/21 13:00  10/21/21 10:10





  Carvedilol 25 Mg Tab  PO   25 mg





  BID ARRON   Administration


 


Cinacalcet  30 mg  10/18/21 10:00  10/21/21 10:09





  Cinacalcet 30 Mg Tab  PO   30 mg





  QDAY ARRON   Administration


 


Clonidine HCl  0.1 mg  10/15/21 13:00  10/21/21 10:10





  Clonidine 0.1 Mg Tab  PO   0.1 mg





  Q12HR ARRON   Administration


 


Heparin Sodium (Porcine)  5,000 unit  10/16/21 08:00  10/21/21 05:11





  Heparin 5,000 Unit/1 Ml Vial  SUB-Q   5,000 unit





  Q8HR ARRON   Administration


 


Hydralazine HCl  100 mg  10/15/21 14:00  10/21/21 10:10





  Hydralazine 100 Mg Tab  PO   100 mg





  TID ARRON   Administration


 


Morphine Sulfate  4 mg  10/15/21 10:36  10/19/21 15:34





  Morphine 4 Mg/1 Ml Inj  IV   4 mg





  Q4H PRN   Administration





  Pain , Severe (7-10)  


 


Naloxone HCl  0.1 mg  10/15/21 10:36 





  Naloxone 0.4 Mg/1 Ml Inj  IV  





  Q2MIN PRN  





  Res Rate </= 8 or 02 SAT < 92%  


 


Nifedipine  90 mg  10/15/21 13:00  10/21/21 10:09





  Nifedipine Xl 90 Mg Tab  PO   90 mg





  QDAY ARRON   Administration


 


Ondansetron HCl  4 mg  10/15/21 11:00 





  Ondansetron 4 Mg/2 Ml Inj  IV  





  Q8H PRN  





  Nausea And Vomiting  


 


Oxycodone/Acetaminophen  1 tab  10/15/21 10:36  10/20/21 15:23





  Oxycodone /Acetaminophen 5-325mg Tab  PO   1 tab





  Q6H PRN   Administration





  Pain, Moderate (4-6)  


 


Sodium Chloride  10 ml  10/15/21 22:00  10/21/21 10:10





  Sodium Chloride 0.9% 10 Ml Flush Syringe  IV   10 ml





  BID ARRON   Administration


 


Sodium Chloride  10 ml  10/15/21 10:36 





  Sodium Chloride 0.9% 10 Ml Flush Syringe  IV  





  PRN PRN  





  LINE FLUSH

## 2021-10-25 ENCOUNTER — HOSPITAL ENCOUNTER (EMERGENCY)
Dept: HOSPITAL 5 - ED | Age: 51
Discharge: HOME | End: 2021-10-25
Payer: MEDICARE

## 2021-10-25 VITALS — DIASTOLIC BLOOD PRESSURE: 88 MMHG | SYSTOLIC BLOOD PRESSURE: 160 MMHG

## 2021-10-25 DIAGNOSIS — I77.0: Primary | ICD-10-CM

## 2021-10-25 DIAGNOSIS — Z79.899: ICD-10-CM

## 2021-10-25 DIAGNOSIS — N18.6: ICD-10-CM

## 2021-10-25 DIAGNOSIS — I12.0: ICD-10-CM

## 2021-10-25 DIAGNOSIS — Z88.8: ICD-10-CM

## 2021-10-25 DIAGNOSIS — J45.909: ICD-10-CM

## 2021-10-25 LAB
BASOPHILS # (AUTO): 0.1 K/MM3 (ref 0–0.1)
BASOPHILS NFR BLD AUTO: 0.8 % (ref 0–1.8)
BUN SERPL-MCNC: 65 MG/DL (ref 9–20)
BUN/CREAT SERPL: 7 %
CALCIUM SERPL-MCNC: 10.2 MG/DL (ref 8.4–10.2)
EOSINOPHIL # BLD AUTO: 0.4 K/MM3 (ref 0–0.4)
EOSINOPHIL NFR BLD AUTO: 4.5 % (ref 0–4.3)
HCT VFR BLD CALC: 21 % (ref 35.5–45.6)
HEMOLYSIS INDEX: 0
HGB BLD-MCNC: 6.9 GM/DL (ref 11.8–15.2)
LYMPHOCYTES # BLD AUTO: 0.9 K/MM3 (ref 1.2–5.4)
LYMPHOCYTES NFR BLD AUTO: 9.9 % (ref 13.4–35)
MCHC RBC AUTO-ENTMCNC: 33 % (ref 32–34)
MCV RBC AUTO: 95 FL (ref 84–94)
MONOCYTES # (AUTO): 1.1 K/MM3 (ref 0–0.8)
MONOCYTES % (AUTO): 12.1 % (ref 0–7.3)
PLATELET # BLD: 217 K/MM3 (ref 140–440)
RBC # BLD AUTO: 2.22 M/MM3 (ref 3.65–5.03)

## 2021-10-25 PROCEDURE — 36415 COLL VENOUS BLD VENIPUNCTURE: CPT

## 2021-10-25 PROCEDURE — 93990 DOPPLER FLOW TESTING: CPT

## 2021-10-25 PROCEDURE — 85025 COMPLETE CBC W/AUTO DIFF WBC: CPT

## 2021-10-25 PROCEDURE — 99284 EMERGENCY DEPT VISIT MOD MDM: CPT

## 2021-10-25 PROCEDURE — 80048 BASIC METABOLIC PNL TOTAL CA: CPT

## 2021-10-25 NOTE — VASCULAR LAB REPORT
Left upper extremity AV fistula Ultrasound



HISTORY: AV FISTULA MALFUNCTION.  Patient reportedly had a revision on 10/19/2021



TECHNIQUE:  Grayscale and color  imaging performed.



COMPARISON:  None



FINDINGS: The left brachial artery is patent with peak systolic velocity of 198 cm/s. Inflow of the A
V fistula is patent with systolic velocity of 233 cm/s. At the level of the distal biceps, peak systo
lic velocity in the fistula is 242 cm/s and then in the mid biceps level 244 cm/s. In the proximal bi
ceps region, peak systolic velocity in the fistula is 576 cm/s and there is a slight narrowing visual
ly. The outflow velocity is 128 cm/s with wide patency.



Flow volume is estimated between approximately 6612-2231 mL/m.



There is a collection about the graft which measures approximately 4 x 4 centimeters in maximal dimen
brianna and is slightly complex.





IMPRESSION: 

1. Patent left upper arm AV fistula, although there appears to be a stenosis at the level the proxima
l biceps.

2. Complex collection along the proximal fistula. Given history of recent revision, this most likely 
represents a hematoma; however, correlate with exam findings and labs.



Signer Name: Herbie Bo MD 

Signed: 10/25/2021 11:16 AM

Workstation Name: [id number]

## 2021-10-25 NOTE — CONSULTATION
History of Present Illness





- Reason for Consult


Consult date: 10/25/21


swelling LUE


Requesting physician: ANNA MACKAY





- History of Present Illness


51-year-old male with end-stage renal disease on hemodialysis who presented to 

the emergency room with swelling from his left upper extremity AV fistula.  

Patient had angioplasty on 10/18/2021 and revision on 10/19/2021.





He has some swelling of the left upper extremity, and a seroma at the revision 

site.  Reviewed ultrasound, differential also includes seroma with some blood 

products.





A stenosis postsurgically was mentioned in the proximal biceps which may be the 

venous anastomotic site.





Although ER note mentioned that the patient was leaking from this seroma for 3 

days, the patient denies this and just reports that he was having some swelling.





No signs of cellulitis or erythema.  Palpable left radial pulse.  No weakness or

numbness of the hand or arm.  No evidence of infection from the right internal 

jugular PermCath.





Past Medical History


Previous Medical History?: Yes


Hx Hypertension: Yes


Hx Heart Attack/AMI: No


Hx Congestive Heart Failure: No


Hx Diabetes: No


Hx Liver Disease: No


Hx Renal Disease: Yes (HD MWF - last on 10/18/21)


Hx Asthma: Yes


Hx COPD: No


Additional medical history: Glaucoma.  afib





Surgical History


Past Surgical History?: Yes


Hx Pacemaker: No


Additional Surgical History: Left A/V Graft





Social History


Smoking Status: Unknown if ever smoked





Medications and Allergies


                                    Allergies











Allergy/AdvReac Type Severity Reaction Status Date / Time


 


diphenhydramine AdvReac Mild Unknown Verified 10/25/21 08:55





[From Benadryl]     











                                Home Medications











 Medication  Instructions  Recorded  Confirmed  Last Taken  Type


 


Epoetin Amadeo-Epbx 10,000 Unit 10,000 unit IV QUITA PRN  vial 09/16/21 10/25/21 

10/22/21 19:00 Rx





[Retacrit]     


 


NIFEdipine XL [Procardia Xl] 90 mg PO QDAY #30 tablet 10/21/21 10/25/21 10/25/21

07:00 Rx


 


carvediloL [Coreg] 25 mg PO QDAY #30 tablet 10/21/21 10/25/21 10/25/21 07:00 Rx


 


cloNIDine [Catapres] 0.1 mg PO Q12HR #60 tablet 10/21/21 10/25/21 10/25/21 07:00

Rx


 


hydrALAZINE [Apresoline TAB] 100 mg PO TID #90 tab 10/21/21 10/25/21 10/25/21 

07:00 Rx


 


Cinacalcet [Sensipar] 30 mg PO DAILY 10/25/21 10/25/21 10/25/21 07:00 History


 


Pantoprazole [Protonix TAB] 40 mg PO DAILY 10/25/21 10/25/21 10/24/21 09:00 

History














Review of Systems


All systems: negative (see HPI)





Exam





- Constitutional


Vitals: 


                                        











Temp Pulse Resp BP Pulse Ox


 


 98.2 F   91 H  23   160/88   96 


 


 10/25/21 14:02  10/25/21 14:02  10/25/21 14:02  10/25/21 14:02  10/25/21 14:02











General appearance: Present: no acute distress





- EENT


Eyes: Present: EOM intact


ENT: hearing intact





- Neck


Neck: Present: supple





- Respiratory


Respiratory effort: normal





- Extremities


Extremities: pulses intact (left radial), normal temperature, normal color, 

abnormal (see HPI)





- Abdominal


General gastrointestinal: Present: soft, non-tender





- Musculoskeletal


Musculoskeletal: strength equal bilaterally





- Psychiatric


Psychiatric: appropriate mood/affect, cooperative





- Neurologic


Neurologic: moves all extremities





Results





- Labs


CBC & Chem 7: 


                                 10/25/21 09:03





                                 10/25/21 09:03


Labs: 


                              Abnormal lab results











  10/25/21 10/25/21 Range/Units





  09:03 09:03 


 


RBC  2.22 L   (3.65-5.03)  M/mm3


 


Hgb  6.9 L   (11.8-15.2)  gm/dl


 


Hct  21.0 L   (35.5-45.6)  %


 


MCV  95 H   (84-94)  fl


 


RDW  18.0 H   (13.2-15.2)  %


 


Lymph % (Auto)  9.9 L   (13.4-35.0)  %


 


Mono % (Auto)  12.1 H   (0.0-7.3)  %


 


Eos % (Auto)  4.5 H   (0.0-4.3)  %


 


Lymph # (Auto)  0.9 L   (1.2-5.4)  K/mm3


 


Mono # (Auto)  1.1 H   (0.0-0.8)  K/mm3


 


Seg Neutrophils %  72.7 H   (40.0-70.0)  %


 


Chloride   97.9 L  ()  mmol/L


 


BUN   65 H  (9-20)  mg/dL


 


Creatinine   9.3 H D  (0.8-1.3)  mg/dL


 


Glucose   120 H  ()  mg/dL














Assessment and Plan





51-year-old patient with end-stage renal disease status post left upper 

extremity AV fistula angioplasty, PermCath placement, and revision who presents 

with swelling of the left upper extremity with seroma with some blood products 

within it.





No evidence of leakage.  Patient denies leakage.





Keep area clean and dry until completely healed.  Seroma should resolved on its 

own.  Discussed with patient that if it becomes red, inflamed, or starts to 

leak, to contact our office.  Contacted office and patient to set up appointment

for patient to see us in approximately 7 days.





Fistula functioning well.  Has thrill.  May have some stenosis that may be 

addressing 14 days after surgery.





Recommend elevation of left upper extremity when sleeping.





Follow-up as outpatient.

## 2021-10-25 NOTE — EMERGENCY DEPARTMENT REPORT
ED General Adult HPI





- General


Chief complaint: Tube Replacement


Stated complaint: LEFT ARM PAIN /POST PORT SUGERY


Time Seen by Provider: 10/25/21 08:28


Source: patient


Mode of arrival: Ambulatory


Limitations: No Limitations





- History of Present Illness


Initial comments: 





Patient is 51 years old male with history of end-stage renal disease on 

hemodialysis.  Patient presented to the ER stating that for the last 3 days he 

noticed a leaking from his recently revised left arm AV fistula.  Patient had a 

revision done on October 19.  Patient denied any fever or chills.  He was 

heading for dialysis today however he was concerned about the leaking so he came

to the ER.





- Related Data


                                Home Medications











 Medication  Instructions  Recorded  Confirmed  Last Taken


 


Cinacalcet [Sensipar] 30 mg PO DAILY 10/25/21 10/25/21 10/25/21 07:00


 


Pantoprazole [Protonix TAB] 40 mg PO DAILY 10/25/21 10/25/21 10/24/21 09:00








                                  Previous Rx's











 Medication  Instructions  Recorded  Last Taken  Type


 


Epoetin Amadeo-Epbx 10,000 Unit 10,000 unit IV QUITA PRN  vial 09/16/21 10/22/21 

19:00 Rx





[Retacrit]    


 


NIFEdipine XL [Procardia Xl] 90 mg PO QDAY #30 tablet 10/21/21 10/25/21 07:00 Rx


 


carvediloL [Coreg] 25 mg PO QDAY #30 tablet 10/21/21 10/25/21 07:00 Rx


 


cloNIDine [Catapres] 0.1 mg PO Q12HR #60 tablet 10/21/21 10/25/21 07:00 Rx


 


hydrALAZINE [Apresoline TAB] 100 mg PO TID #90 tab 10/21/21 10/25/21 07:00 Rx











                                    Allergies











Allergy/AdvReac Type Severity Reaction Status Date / Time


 


diphenhydramine AdvReac Mild Unknown Verified 10/25/21 08:55





[From Benadryl]     














ED Review of Systems


ROS: 


Stated complaint: LEFT ARM PAIN /POST PORT SUGERY


Other details as noted in HPI





Comment: All other systems reviewed and negative


Constitutional: denies: chills, fever


Respiratory: denies: cough, shortness of breath, SOB with exertion


Cardiovascular: denies: chest pain, palpitations


Gastrointestinal: denies: abdominal pain, nausea, vomiting


Genitourinary: denies: dysuria


Musculoskeletal: denies: back pain


Neurological: denies: headache, weakness, numbness, paresthesias, confusion





ED Past Medical Hx





- Past Medical History


Previous Medical History?: Yes


Hx Hypertension: Yes


Hx Heart Attack/AMI: No


Hx Congestive Heart Failure: No


Hx Diabetes: No


Hx Liver Disease: No


Hx Renal Disease: Yes (HD MWF - last on 10/18/21)


Hx Asthma: Yes


Hx COPD: No


Additional medical history: Glaucoma.  afib





- Surgical History


Past Surgical History?: Yes


Hx Pacemaker: No


Additional Surgical History: Left A/V Graft





- Social History


Smoking Status: Unknown if ever smoked





- Medications


Home Medications: 


                                Home Medications











 Medication  Instructions  Recorded  Confirmed  Last Taken  Type


 


Epoetin Amadeo-Epbx 10,000 Unit 10,000 unit IV QUITA PRN  vial 09/16/21 10/25/21 

10/22/21 19:00 Rx





[Retacrit]     


 


NIFEdipine XL [Procardia Xl] 90 mg PO QDAY #30 tablet 10/21/21 10/25/21 10/25/21

07:00 Rx


 


carvediloL [Coreg] 25 mg PO QDAY #30 tablet 10/21/21 10/25/21 10/25/21 07:00 Rx


 


cloNIDine [Catapres] 0.1 mg PO Q12HR #60 tablet 10/21/21 10/25/21 10/25/21 07:00

 Rx


 


hydrALAZINE [Apresoline TAB] 100 mg PO TID #90 tab 10/21/21 10/25/21 10/25/21 

07:00 Rx


 


Cinacalcet [Sensipar] 30 mg PO DAILY 10/25/21 10/25/21 10/25/21 07:00 History


 


Pantoprazole [Protonix TAB] 40 mg PO DAILY 10/25/21 10/25/21 10/24/21 09:00 

History














ED Physical Exam





- General


Limitations: No Limitations


General appearance: alert, in no apparent distress





- Head


Head exam: Present: atraumatic, normocephalic, normal inspection





- Eye


Eye exam: Present: normal appearance, PERRL





- ENT


ENT exam: Present: normal exam, normal orophraynx.  Absent: mucous membranes 

moist





- Neck


Neck exam: Present: normal inspection, full ROM.  Absent: tenderness, 

meningismus





- Respiratory


Respiratory exam: Present: normal lung sounds bilaterally





- Cardiovascular


Cardiovascular Exam: Present: regular rate, normal rhythm, normal heart sounds





- GI/Abdominal


GI/Abdominal exam: Present: soft, normal bowel sounds.  Absent: distended, 

tenderness, guarding, rebound, rigid, mass, bruit, pulsatile mass, hernia





- Extremities Exam


Extremities exam: Present: normal capillary refill, other (Left arm fistula.  

Surgical wound appears intact with mild greenish discharge at the proximal site 

of the wound.  No erythema noticed.  Positive thrill.).  Absent: calf tenderness





- Back Exam


Back exam: Present: normal inspection, full ROM.  Absent: CVA tenderness (R), 

CVA tenderness (L)





- Neurological Exam


Neurological exam: Present: alert, oriented X3, CN II-XII intact





- Psychiatric


Psychiatric exam: Present: normal mood





- Skin


Skin exam: Present: warm





ED Course


                                   Vital Signs











  10/25/21 10/25/21 10/25/21





  08:05 08:07 08:09


 


Temperature 98.1 F 98.0 F 


 


Pulse Rate 89 88 


 


Respiratory 16 17 17





Rate   


 


Blood Pressure  121/68 


 


Blood Pressure 122/70  





[Left]   


 


O2 Sat by Pulse 97 98 99





Oximetry   














  10/25/21 10/25/21 10/25/21





  08:33 08:45 09:01


 


Temperature   


 


Pulse Rate  88 86


 


Respiratory  14 22





Rate   


 


Blood Pressure  118/69 118/69


 


Blood Pressure   





[Left]   


 


O2 Sat by Pulse 97 97 97





Oximetry   














  10/25/21 10/25/21 10/25/21





  09:31 10:01 11:25


 


Temperature   


 


Pulse Rate 91 H 109 H 90


 


Respiratory 24 16 18





Rate   


 


Blood Pressure 121/68 121/70 121/70


 


Blood Pressure   





[Left]   


 


O2 Sat by Pulse 97 96 97





Oximetry   














  10/25/21 10/25/21





  11:45 12:15


 


Temperature  


 


Pulse Rate 85 78


 


Respiratory 18 20





Rate  


 


Blood Pressure 130/70 152/80


 


Blood Pressure  





[Left]  


 


O2 Sat by Pulse 99 93





Oximetry  














- Consultations


Consultation #1: 





10/25/21 12:45


I discussed the patient with Dr. Interiano, vascular on-call.  He stated that he is 

coming down to evaluate the patient in the emergency room.





ED Medical Decision Making





- Lab Data


Result diagrams: 


                                 10/25/21 09:03





                                 10/25/21 09:03





- Radiology Data


Radiology results: report reviewed





- Medical Decision Making





Patient is 51 years old male with history of end-stage renal disease on 

hemodialysis.  Patient presented to the ER stating that for the last 3 days he 

noticed a leaking from his recently revised left arm AV fistula.  Patient had a 

revision done on October 19.  Patient denied any fever or chills.  He was 

heading for dialysis today however he was concerned about the leaking so he came

 to the ER.





Patient remained stable in the ER with stable vital sign.  Patient has been 

evaluated by Dr. Interiano, vascular surgeon on-call.  He stated this is most likely 

seroma and patient can be discharged home and have his dialysis through his cath

 now.  Patient advised to head to his dialysis clinic today which is usually 

started at 3 PM according to him.  Patient advised to return to the ER if he 

develop any new symptoms.





Critical care attestation.: 


If time is entered above; I have spent that time in minutes in the direct care 

of this critically ill patient, excluding procedure time.








ED Disposition


Clinical Impression: 


 AV fistula, End stage renal disease on dialysis





Disposition: 01 HOME / SELF CARE / HOMELESS


Is pt being admited?: No


Condition: Stable


Instructions:  Dialysis Fistulogram, Hemodialysis, Care After


Additional Instructions: 


Please go to your dialysis center to have your dialysis today.


Referrals: 


PRIMARY CARE,MD [Primary Care Provider] - 3-5 Days

## 2021-11-01 ENCOUNTER — HOSPITAL ENCOUNTER (EMERGENCY)
Dept: HOSPITAL 5 - ED | Age: 51
Discharge: HOME | End: 2021-11-01
Payer: MEDICARE

## 2021-11-01 VITALS — SYSTOLIC BLOOD PRESSURE: 164 MMHG | DIASTOLIC BLOOD PRESSURE: 98 MMHG

## 2021-11-01 DIAGNOSIS — I12.0: ICD-10-CM

## 2021-11-01 DIAGNOSIS — N18.6: ICD-10-CM
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
## 2021-11-01 DIAGNOSIS — L76.34: Primary | ICD-10-CM

## 2021-11-01 DIAGNOSIS — J45.909: ICD-10-CM

## 2021-11-01 DIAGNOSIS — Z88.8: ICD-10-CM

## 2021-11-01 LAB
ALBUMIN SERPL-MCNC: 3.6 G/DL (ref 3.9–5)
ALT SERPL-CCNC: 8 UNITS/L (ref 7–56)
BASOPHILS # (AUTO): 0.1 K/MM3 (ref 0–0.1)
BASOPHILS NFR BLD AUTO: 0.8 % (ref 0–1.8)
BUN SERPL-MCNC: 51 MG/DL (ref 9–20)
BUN/CREAT SERPL: 6 %
CALCIUM SERPL-MCNC: 9.9 MG/DL (ref 8.4–10.2)
EOSINOPHIL # BLD AUTO: 0.6 K/MM3 (ref 0–0.4)
EOSINOPHIL NFR BLD AUTO: 7.1 % (ref 0–4.3)
HCT VFR BLD CALC: 18.8 % (ref 35.5–45.6)
HEMOLYSIS INDEX: 8
HGB BLD-MCNC: 6.2 GM/DL (ref 11.8–15.2)
LYMPHOCYTES # BLD AUTO: 0.8 K/MM3 (ref 1.2–5.4)
LYMPHOCYTES NFR BLD AUTO: 9.9 % (ref 13.4–35)
MCHC RBC AUTO-ENTMCNC: 33 % (ref 32–34)
MCV RBC AUTO: 96 FL (ref 84–94)
MONOCYTES # (AUTO): 0.8 K/MM3 (ref 0–0.8)
MONOCYTES % (AUTO): 10 % (ref 0–7.3)
PLATELET # BLD: 251 K/MM3 (ref 140–440)
RBC # BLD AUTO: 1.95 M/MM3 (ref 3.65–5.03)

## 2021-11-01 PROCEDURE — 36415 COLL VENOUS BLD VENIPUNCTURE: CPT

## 2021-11-01 PROCEDURE — 80053 COMPREHEN METABOLIC PANEL: CPT

## 2021-11-01 PROCEDURE — 99283 EMERGENCY DEPT VISIT LOW MDM: CPT

## 2021-11-01 PROCEDURE — 85025 COMPLETE CBC W/AUTO DIFF WBC: CPT

## 2021-11-01 NOTE — EVENT NOTE
ED Screening Note


Date of service: 11/01/21


Time: 04:00


ED Screening Note: 





Patient is a 51-year-old -American male with a history of ESRD on 

hemodialysis who is 1 week status post left upper arm fistula revision surgery 

and who presents to the ED with persistent severely painful left upper arm with 

mild discharge from the surgical site for the last 1 week, worse in the last 2 

days.  Patient states that the pain radiates from the left upper arm distally to

the left forearm constantly.  Patient denies fall, traumatic injury, dizziness, 

syncope, chest pain, shortness of breath, abdominal pain, nausea and vomiting, 

fever, chills, cough or neck pain.





This initial assessment/diagnostic orders/clinical plan/treatment(s) is/are 

subject to change based on patients health status, clinical progression and re-

assessment by fellow clinical providers in the ED. Further treatment and workup 

at subsequent clinical providers discretion. Patient/guardian urged not to elope

from the ED as their condition may be serious if not clinically assessed and 

managed. 





Initial orders include: 


CBC, CMP, Doppler ultrasound left upper extremity

## 2021-11-01 NOTE — EMERGENCY DEPARTMENT REPORT
ED General Adult HPI





- General


Chief complaint: Extremity Injury, Upper


Stated complaint: LT ARM PAIN AND SWELLING X 1 WK


Time Seen by Provider: 11/01/21 07:49


Source: patient


Mode of arrival: Ambulatory


Limitations: No Limitations





- History of Present Illness


Initial comments: 





51-year-old male presents to ED with left arm pain.  Patient underwent left AV 

fistula revision on 10/19/2021.  Patient presented to the ED 6 days later, on 

10/25/2021, with complaint of swelling and fluid leaking from the area of the 

procedure.  Patient had an ultrasound on that day that showed complex collection

that could represent a hematoma.  Patient was evaluated by Dr. Interiano in the ED as

well.  Symptoms thought to be secondary to a seroma.  Patient now returns 1 week

later, complaining of continued pain and swelling in the left arm since his 

evaluation last week.  States he has tried elevating the extremity without 

relief.  He denies any fever, discharge, numbness or tingling, weakness.  

Patient reports he has a follow-up appointment with Dr. Womack in 3 days on 

11/4/2021


-: week(s) (1)


Location: left, upper extremity


Radiation: distal


Severity scale (0 -10): 4


Quality: aching


Consistency: constant


Improves with: none


Worsens with: none


Associated Symptoms: denies: fever/chills





- Related Data


                                Home Medications











 Medication  Instructions  Recorded  Confirmed  Last Taken


 


Cinacalcet [Sensipar] 30 mg PO DAILY 10/25/21 10/25/21 10/25/21 07:00


 


Pantoprazole [Protonix TAB] 40 mg PO DAILY 10/25/21 10/25/21 10/24/21 09:00








                                  Previous Rx's











 Medication  Instructions  Recorded  Last Taken  Type


 


Epoetin Amadeo-Epbx 10,000 Unit 10,000 unit IV QUITA PRN  vial 09/16/21 10/22/21 

19:00 Rx





[Retacrit]    


 


NIFEdipine XL [Procardia Xl] 90 mg PO QDAY #30 tablet 10/21/21 10/25/21 07:00 Rx


 


carvediloL [Coreg] 25 mg PO QDAY #30 tablet 10/21/21 10/25/21 07:00 Rx


 


cloNIDine [Catapres] 0.1 mg PO Q12HR #60 tablet 10/21/21 10/25/21 07:00 Rx


 


hydrALAZINE [Apresoline TAB] 100 mg PO TID #90 tab 10/21/21 10/25/21 07:00 Rx


 


traMADoL [Ultram 50 MG tab] 25 mg PO Q6HR PRN #7 tablet 11/01/21 Unknown Rx











                                    Allergies











Allergy/AdvReac Type Severity Reaction Status Date / Time


 


diphenhydramine AdvReac Mild Unknown Verified 10/25/21 08:55





[From Benadryl]     














ED Review of Systems


ROS: 


Stated complaint: LT ARM PAIN AND SWELLING X 1 WK


Other details as noted in HPI





Comment: All other systems reviewed and negative


Constitutional: denies: fever


Musculoskeletal: as per HPI


Neurological: denies: weakness, numbness, paresthesias





ED Past Medical Hx





- Past Medical History


Hx Hypertension: Yes


Hx Heart Attack/AMI: No


Hx Congestive Heart Failure: No


Hx Diabetes: No


Hx Liver Disease: No


Hx Renal Disease: Yes (HD MWF - last on 10/18/21)


Hx Asthma: Yes


Hx COPD: No


Additional medical history: Glaucoma.  afib





- Surgical History


Past Surgical History?: Yes


Hx Pacemaker: No


Additional Surgical History: Left A/V Graft





- Social History


Smoking Status: Never Smoker


Substance Use Type: Alcohol





- Medications


Home Medications: 


                                Home Medications











 Medication  Instructions  Recorded  Confirmed  Last Taken  Type


 


Epoetin Amadeo-Epbx 10,000 Unit 10,000 unit IV QUITA PRN  vial 09/16/21 10/25/21 

10/22/21 19:00 Rx





[Retacrit]     


 


NIFEdipine XL [Procardia Xl] 90 mg PO QDAY #30 tablet 10/21/21 10/25/21 10/25/21

07:00 Rx


 


carvediloL [Coreg] 25 mg PO QDAY #30 tablet 10/21/21 10/25/21 10/25/21 07:00 Rx


 


cloNIDine [Catapres] 0.1 mg PO Q12HR #60 tablet 10/21/21 10/25/21 10/25/21 07:00

 Rx


 


hydrALAZINE [Apresoline TAB] 100 mg PO TID #90 tab 10/21/21 10/25/21 10/25/21 07

:00 Rx


 


Cinacalcet [Sensipar] 30 mg PO DAILY 10/25/21 10/25/21 10/25/21 07:00 History


 


Pantoprazole [Protonix TAB] 40 mg PO DAILY 10/25/21 10/25/21 10/24/21 09:00 

History


 


traMADoL [Ultram 50 MG tab] 25 mg PO Q6HR PRN #7 tablet 11/01/21  Unknown Rx














ED Physical Exam





- General


Limitations: No Limitations


General appearance: alert, in no apparent distress





- Head


Head exam: Present: atraumatic, normocephalic





- Eye


Eye exam: Present: normal appearance, EOMI





- ENT


ENT exam: Present: mucous membranes moist





- Neck


Neck exam: Present: normal inspection





- Respiratory


Respiratory exam: Present: normal lung sounds bilaterally.  Absent: respiratory 

distress





- Cardiovascular


Cardiovascular Exam: Present: regular rate, normal rhythm





- GI/Abdominal


GI/Abdominal exam: Absent: distended





- Extremities Exam


Extremities exam: Present: other (AV fistula present in left upper arm; palpable

thrill present; no evidence of discharge from incision site; upper arm appears 

swollen, there is some induration and tenderness present; extremity is warm, cap

refill is normal, patient able to flex and extend his fingers; sensation intact)





- Neurological Exam


Neurological exam: Present: alert, oriented X3.  Absent: motor sensory deficit





- Psychiatric


Psychiatric exam: Present: normal affect, normal mood





- Skin


Skin exam: Present: warm, dry, intact





ED Course


                                   Vital Signs











  11/01/21





  02:25


 


Temperature 98.7 F


 


Pulse Rate 96 H


 


Respiratory 18





Rate 


 


Blood Pressure 164/98





[Left] 


 


O2 Sat by Pulse 98





Oximetry 














- Consultations


Consultation #1: 





11/01/21 08:32


Spoke with Dr. Interiano, vascular surgeon.  He evaluated patient last week during 

his ED visit.  States since no leukocytosis, afebrile, no evidence of purulent 

discharge on exam, patient can be discharged and needs to follow-up as scheduled

 on Thursday.  States will likely take 2 to 4 weeks for seroma to resolve.








ED Medical Decision Making





- Lab Data


Result diagrams: 


                                 11/01/21 04:04





                                 11/01/21 04:04


Critical care attestation.: 


If time is entered above; I have spent that time in minutes in the direct care 

of this critically ill patient, excluding procedure time.








ED Disposition


Clinical Impression: 


 Seroma after procedure





Disposition: 01 HOME / SELF CARE / HOMELESS


Is pt being admited?: No


Condition: Stable


Instructions:  Seroma


Prescriptions: 


traMADoL [Ultram 50 MG tab] 25 mg PO Q6HR PRN #7 tablet


 PRN Reason: Pain


Referrals: 


PRIMARY CARE,MD [Primary Care Provider] - 3-5 Days


Time of Disposition: 08:34

## 2021-11-03 ENCOUNTER — HOSPITAL ENCOUNTER (OUTPATIENT)
Dept: HOSPITAL 5 - ED | Age: 51
Setting detail: OBSERVATION
Discharge: HOME | End: 2021-11-03
Attending: INTERNAL MEDICINE | Admitting: INTERNAL MEDICINE
Payer: MEDICARE

## 2021-11-03 VITALS — DIASTOLIC BLOOD PRESSURE: 89 MMHG | SYSTOLIC BLOOD PRESSURE: 172 MMHG

## 2021-11-03 DIAGNOSIS — N18.6: ICD-10-CM

## 2021-11-03 DIAGNOSIS — H40.9: ICD-10-CM

## 2021-11-03 DIAGNOSIS — E87.70: ICD-10-CM

## 2021-11-03 DIAGNOSIS — D63.1: ICD-10-CM

## 2021-11-03 DIAGNOSIS — I12.0: Primary | ICD-10-CM

## 2021-11-03 DIAGNOSIS — E11.22: ICD-10-CM

## 2021-11-03 DIAGNOSIS — Z99.2: ICD-10-CM

## 2021-11-03 DIAGNOSIS — I48.91: ICD-10-CM

## 2021-11-03 LAB
%HYPO/RBC NFR BLD AUTO: (no result) %
BAND NEUTROPHILS # (MANUAL): 0.1 K/MM3
BUN SERPL-MCNC: 100 MG/DL (ref 9–20)
BUN/CREAT SERPL: 9 %
CALCIUM SERPL-MCNC: 9.6 MG/DL (ref 8.4–10.2)
HCT VFR BLD CALC: 19.4 % (ref 35.5–45.6)
HEMOLYSIS INDEX: 16
HGB BLD-MCNC: 6.2 GM/DL (ref 11.8–15.2)
MCHC RBC AUTO-ENTMCNC: 32 % (ref 32–34)
MCV RBC AUTO: 97 FL (ref 84–94)
MYELOCYTES # (MANUAL): 0.1 K/MM3
OVALOCYTES BLD QL SMEAR: (no result)
PLATELET # BLD: 230 K/MM3 (ref 140–440)
PROMYELOCYTES # (MANUAL): 0 K/MM3
RBC # BLD AUTO: 2.01 M/MM3 (ref 3.65–5.03)
TOTAL CELLS COUNTED BLD: 100

## 2021-11-03 PROCEDURE — 80048 BASIC METABOLIC PNL TOTAL CA: CPT

## 2021-11-03 PROCEDURE — 99285 EMERGENCY DEPT VISIT HI MDM: CPT

## 2021-11-03 PROCEDURE — G0257 UNSCHED DIALYSIS ESRD PT HOS: HCPCS

## 2021-11-03 PROCEDURE — 36430 TRANSFUSION BLD/BLD COMPNT: CPT

## 2021-11-03 PROCEDURE — 86900 BLOOD TYPING SEROLOGIC ABO: CPT

## 2021-11-03 PROCEDURE — 93005 ELECTROCARDIOGRAM TRACING: CPT

## 2021-11-03 PROCEDURE — 85007 BL SMEAR W/DIFF WBC COUNT: CPT

## 2021-11-03 PROCEDURE — 85025 COMPLETE CBC W/AUTO DIFF WBC: CPT

## 2021-11-03 PROCEDURE — 86901 BLOOD TYPING SEROLOGIC RH(D): CPT

## 2021-11-03 PROCEDURE — 36415 COLL VENOUS BLD VENIPUNCTURE: CPT

## 2021-11-03 PROCEDURE — 86850 RBC ANTIBODY SCREEN: CPT

## 2021-11-03 PROCEDURE — G0378 HOSPITAL OBSERVATION PER HR: HCPCS

## 2021-11-03 PROCEDURE — P9016 RBC LEUKOCYTES REDUCED: HCPCS

## 2021-11-03 PROCEDURE — 86920 COMPATIBILITY TEST SPIN: CPT

## 2021-11-03 PROCEDURE — 71045 X-RAY EXAM CHEST 1 VIEW: CPT

## 2021-11-03 NOTE — CONSULTATION
History of Present Illness





- History of Present Illness





Thank you for the consultation


Patient was evaluated today


My assessment and plan are as follows





#End-stage renal disease: Patient is currently on maintenance hemodialysis at [facility] facility where he dialyzes 3 times a week currently being admitted here

with shortness of breath and severe anemia and is being admitted


Will monitor dialysis-related labs periodically.


Hemodialysis nurse to ultrafiltrate as tolerated, systolic blood pressure must 

be kept above 100, heart rate below 100





#Medication management: Reviewed today





#Electrolyte and volume: Volume overloaded as always very noncompliant patient


Will monitor and follow





#Dialysis Access: Was recently seen by vascular surgery noted to have cerumen 

will need to follow-up with access is complicated over the years and lately 

getting worse


Working well no issues per patient





#Anemia in ESRD: Patient will receive packed red blood cell transfusion and will

need to stay for further workup including a GI consultation and possibly a 

hematology consultation


To monitor hemoglobin and hematocrit periodically erythropoietin as needed,





#Bone mineral disorder and secondary hyperparathyroidism: 


Goal phosphorus under 5-1/2 PTH under 600


Monitor phosphorus and PTH level periodically,





#Cardiovascular: Prior history of hypertension atrial fibrillation, coronary 

artery disease and myocardial infarction





#Diet and nutrition: Nepro, multivitamin 





#End-stage kidney disease Counseling: 


Done regarding all the issues related to end-stage kidney disease care at length

regarding diet lifestyle changes fluid restriction sodium restriction and blood 

pressure monitoring from home





#Overall compliance extremely poor,


Patient has been counseled educated that he is very high risk for mortality 

adverse outcome, he is very poorly involved in his care, he needs to take 

ownership of his health as well.


Have adequately counseled educated this patient to the best of my knowledge and 

belief prognosis will depend on his compliance with the treatment








Author:


Raul Naranjo M.D.


[facility], [address]


[address]


[place] [address]


Tel; [phone]











Source of information: From patient as well as patient's current chart





History of present illness


51-year-old -American male who is been currently established in our 

clinic for dialysis care, has been noted to have low hemoglobin, recently has 

had AV graft revision which cause seroma.  Patient's hemoglobin at the clinic 

was 6.2 is currently in Monday Wednesday Friday hemodialysis, last dialysis 

treatment was 5 days ago,


Patient is here with shortness of breath, fluid overload, and severe anemia





Past medical history:


Severe noncompliance


ESRD


Hypertension


Chronic fluid overload


Coronary artery disease


Myocardial infarction


Atrial fibrillation


Hypertension





Current allergies: Reviewed from the current chart





Social history: Reviewed from the current chart





Family history: Reviewed from the current chart








Review of system:


Positive for severe anemia, shortness of breath missing dialysis treatment


All other review of systems negative





Physical examination


Vitals: Reviewed


General: No acute distress


HEENT: Oral mucosa moist no pallor or icterus


Neck: Supple without any JVD thyromegaly or nodular mass


Chest: Bilateral crackles


Heart: Regular rate and rhythm S1-S2 heard no S3-S4


Abdomen: Soft nontender, bowel sounds present no renal bruit no suprapubic 

masses no CVA tenderness noted


Extremity: 2+ edema dry skin no peripheral cyanosis


Endocrine: Thyroid not enlarged


Psychiatric: No agitation and aggression noted


Musculoskeletal: No joint effusion noted








Labs and x-rays: Reviewed from this admission





Medications and Allergies


                                    Allergies











Allergy/AdvReac Type Severity Reaction Status Date / Time


 


diphenhydramine AdvReac Mild Unknown Verified 10/25/21 08:55





[From [facility]]     











                                Home Medications











 Medication  Instructions  Recorded  Confirmed  Last Taken  Type


 


Epoetin Amadeo-Epbx 10,000 Unit 10,000 unit IV QUITA PRN  vial 09/16/21 10/25/21 

10/22/21 19:00 Rx





[Retacrit]     


 


NIFEdipine XL [Procardia Xl] 90 mg PO QDAY #30 tablet 10/21/21 10/25/21 10/25/21

07:00 Rx


 


carvediloL [Coreg] 25 mg PO QDAY #30 tablet 10/21/21 10/25/21 10/25/21 07:00 Rx


 


cloNIDine [Catapres] 0.1 mg PO Q12HR #60 tablet 10/21/21 10/25/21 10/25/21 07:00

 Rx


 


hydrALAZINE [Apresoline TAB] 100 mg PO TID #90 tab 10/21/21 10/25/21 10/25/21 

07:00 Rx


 


Cinacalcet [Sensipar] 30 mg PO DAILY 10/25/21 10/25/21 10/25/21 07:00 History


 


Pantoprazole [Protonix TAB] 40 mg PO DAILY 10/25/21 10/25/21 10/24/21 09:00 

History


 


traMADoL [Ultram 50 MG tab] 25 mg PO Q6HR PRN #7 tablet 11/01/21  Unknown Rx














Exam





- Vital Signs


Vital signs: 


                                   Vital Signs











Pulse Resp BP Pulse Ox


 


 87   12   131/69   99 


 


 11/03/21 09:15  11/03/21 09:15  11/03/21 09:15  11/03/21 09:15














Results





- Lab Results





                                 11/03/21 09:06





                                 11/03/21 09:06


                             Most recent lab results











Calcium  9.6 mg/dL (8.4-10.2)   11/03/21  09:06

## 2021-11-03 NOTE — XRAY REPORT
CHEST 1 VIEW 



INDICATION: 

SOB.



COMPARISON: 

10/15/2021



FINDINGS:



SUPPORT DEVICES: Right IJ CVL with tip in the high right atrium.



HEART: Stable cardiomegaly. 



LUNGS/PLEURA: Mild interstitial edema. No dense consolidation or effusion. No pneumothorax.



ADDITIONAL FINDINGS: None.







IMPRESSION:

1. New right IJ catheter in satisfactory position with no complication.

2. Mild interstitial edema.



Signer Name: Herbie Bo MD 

Signed: 11/3/2021 9:37 AM

Workstation Name: [id number]

## 2021-11-03 NOTE — EMERGENCY DEPARTMENT REPORT
HPI





- General


Time Seen by Provider: 11/03/21 08:43





- HPI


HPI: 





51-year-old -American male presents to the emergency department via EMS 

from home after he was contacted by his dialysis clinic and nephrologist, Dr. Naranjo, to go to the emergency department for a blood transfusion.  The patient 

was seen here on 10/25, and then again 2 days ago on 11/1, regarding some left u

pper extremity pain and swelling after a AV graft revision that caused a seroma.

 Patient had some blood work 2 days ago that showed a hemoglobin of 6.2.  It 

appears that the nephrologist became aware of this hemoglobin level and the 

dialysis clinic felt that it was too low to get dialysis today.  He has end-

stage renal disease on hemodialysis on Monday/Wednesday/Friday and last had 

dialysis on Friday, 5 days ago.  The patient also has a history of hypertension,

atrial fibrillation on anticoagulation, coronary artery disease with previous 

MI.  The patient just complains of some shortness of breath, but denies any 

chest pain, lower extremity swelling, fever, nausea, vomiting, cough, back pain.





ED Past Medical Hx





- Past Medical History


Hx Hypertension: Yes


Hx Heart Attack/AMI: No


Hx Congestive Heart Failure: No


Hx Diabetes: No


Hx Liver Disease: No


Hx Renal Disease: Yes (HD MWF - last on 10/18/21)


Hx Asthma: Yes


Hx COPD: No


Additional medical history: Glaucoma.  afib





- Surgical History


Hx Pacemaker: No


Additional Surgical History: Left A/V Graft





- Social History


Smoking Status: Never Smoker


Substance Use Type: Alcohol





- Medications


Home Medications: 


                                Home Medications











 Medication  Instructions  Recorded  Confirmed  Last Taken  Type


 


Epoetin Amadeo-Epbx 10,000 Unit 10,000 unit IV QUITA PRN  vial 09/16/21 10/25/21 

10/22/21 19:00 Rx





[Retacrit]     


 


NIFEdipine XL [Procardia Xl] 90 mg PO QDAY #30 tablet 10/21/21 10/25/21 10/25/21

07:00 Rx


 


carvediloL [Coreg] 25 mg PO QDAY #30 tablet 10/21/21 10/25/21 10/25/21 07:00 Rx


 


cloNIDine [Catapres] 0.1 mg PO Q12HR #60 tablet 10/21/21 10/25/21 10/25/21 07:00

 Rx


 


hydrALAZINE [Apresoline TAB] 100 mg PO TID #90 tab 10/21/21 10/25/21 10/25/21 

07:00 Rx


 


Cinacalcet [Sensipar] 30 mg PO DAILY 10/25/21 10/25/21 10/25/21 07:00 History


 


Pantoprazole [Protonix TAB] 40 mg PO DAILY 10/25/21 10/25/21 10/24/21 09:00 

History


 


traMADoL [Ultram 50 MG tab] 25 mg PO Q6HR PRN #7 tablet 11/01/21  Unknown Rx














ED Review of Systems


ROS: 


Stated complaint: ROSALBA/DIALYSIS


Other details as noted in HPI





Comment: All other systems reviewed and negative


Constitutional: denies: chills, fever


Eyes: denies: eye pain, vision change


ENT: denies: ear pain, throat pain


Respiratory: shortness of breath.  denies: cough


Cardiovascular: denies: chest pain, edema


Gastrointestinal: denies: abdominal pain, vomiting


Genitourinary: denies: dysuria, discharge


Musculoskeletal: denies: back pain, arthralgia


Skin: denies: rash, lesions


Neurological: denies: headache, weakness





Physical Exam





- Physical Exam


Physical Exam: 





GENERAL: The patient is well-developed well-nourished.


HENT: Normocephalic.  Atraumatic.    Patient has moist mucous membranes.


EYES: Extraocular motions are intact


NECK: Supple. Trachea is midline.


CHEST/LUNGS: Clear to auscultation.  There is no respiratory distress noted.


HEART/CARDIOVASCULAR: Regular.  There is no tachycardia.  There is no murmur.


ABDOMEN: Abdomen is soft, nontender.  Patient has normal bowel sounds. 


SKIN: Skin is warm and dry. 


NEURO: The patient is awake, alert, and oriented.  The patient is cooperative.  

The patient has no focal neurologic deficits.  Normal speech.


MUSCULOSKELETAL: There is no tenderness or deformity.  There is no limitation 

range of motion.





ED Medical Decision Making





- Lab Data


Result diagrams: 


                                 11/03/21 09:06





                                 11/03/21 09:06





                                   Lab Results











  11/03/21 11/03/21 11/03/21 Range/Units





  09:06 09:06 09:15 


 


WBC  8.5    (4.5-11.0)  K/mm3


 


RBC  2.01 L    (3.65-5.03)  M/mm3


 


Hgb  6.2 L    (11.8-15.2)  gm/dl


 


Hct  19.4 L*    (35.5-45.6)  %


 


MCV  97 H    (84-94)  fl


 


MCH  31    (28-32)  pg


 


MCHC  32    (32-34)  %


 


RDW  17.5 H    (13.2-15.2)  %


 


Plt Count  230    (140-440)  K/mm3


 


Add Manual Diff  Complete    


 


Total Counted  100    


 


Seg Neuts % (Manual)  84.0 H    (40.0-70.0)  %


 


Band Neutrophils %  1.0    %


 


Lymphocytes % (Manual)  12.0 L    (13.4-35.0)  %


 


Monocytes % (Manual)  1.0    (0.0-7.3)  %


 


Eosinophils % (Manual)  1.0    (0.0-4.3)  %


 


Myelocytes %  1.0    %


 


Nucleated RBC %  1.0 H    (0.0-0.9)  %


 


Seg Neutrophils # Man  7.1    (1.8-7.7)  K/mm3


 


Band Neutrophils #  0.1    K/mm3


 


Lymphocytes # (Manual)  1.0 L    (1.2-5.4)  K/mm3


 


Abs React Lymphs (Man)  0.0    K/mm3


 


Monocytes # (Manual)  0.1    (0.0-0.8)  K/mm3


 


Eosinophils # (Manual)  0.1    (0.0-0.4)  K/mm3


 


Basophils # (Manual)  0.0    (0.0-0.1)  K/mm3


 


Metamyelocytes #  0.0    K/mm3


 


Myelocytes #  0.1    K/mm3


 


Promyelocytes #  0.0    K/mm3


 


Blast Cells #  0.0    K/mm3


 


Hypersegmented Neuts  Not Reportable    


 


Hyposegmented Neuts  Not Reportable    


 


Hypogranular Neuts  Not Reportable    


 


Smudge Cells  Not Reportable    


 


Toxic Granulation  Not Reportable    


 


Toxic Vacuolation  Not Reportable    


 


Dohle Bodies  Not Reportable    


 


Pelger-Huet Anomaly  Not Reportable    


 


Nel Rods  Not Reportable    


 


Platelet Estimate  Consistent w auto    


 


Clumped Platelets  Not Reportable    


 


Plt Clumps, EDTA  Not Reportable    


 


Large Platelets  Not Reportable    


 


Giant Platelets  Not Reportable    


 


Platelet Satelliting  Not Reportable    


 


Plt Morphology Comment  Not Reportable    


 


RBC Morphology  Not Reportable    


 


Dimorphic RBCs  Not Reportable    


 


Polychromasia  1+    


 


Hypochromasia  2+    


 


Poikilocytosis  Not Reportable    


 


Anisocytosis  Not Reportable    


 


Microcytosis  Not Reportable    


 


Macrocytosis  Not Reportable    


 


Spherocytes  Not Reportable    


 


Pappenheimer Bodies  Not Reportable    


 


Sickle Cells  Not Reportable    


 


Target Cells  Not Reportable    


 


Tear Drop Cells  Not Reportable    


 


Ovalocytes  1+    


 


Helmet Cells  Not Reportable    


 


Meredith-[place] Bodies  Not Reportable    


 


Cabot Rings  Not Reportable    


 


[place] Cells  Not Reportable    


 


Bite Cells  Not Reportable    


 


Crenated Cell  Not Reportable    


 


Elliptocytes  Not Reportable    


 


Acanthocytes (Spur)  Not Reportable    


 


Rouleaux  Not Reportable    


 


Hemoglobin C Crystals  Not Reportable    


 


Schistocytes  Not Reportable    


 


Malaria parasites  Not Reportable    


 


Behzad Bodies  Not Reportable    


 


Hem Pathologist Commnt  No    


 


Sodium   135 L   (137-145)  mmol/L


 


Potassium   4.7   (3.6-5.0)  mmol/L


 


Chloride   92.1 L   ()  mmol/L


 


Carbon Dioxide   25   (22-30)  mmol/L


 


Anion Gap   23   mmol/L


 


BUN   100 H   (9-20)  mg/dL


 


Creatinine   11.1 H   (0.8-1.3)  mg/dL


 


Estimated GFR   6   ml/min


 


BUN/Creatinine Ratio   9   %


 


Glucose   135 H   ()  mg/dL


 


Calcium   9.6   (8.4-10.2)  mg/dL


 


Blood Type    O POSITIVE  


 


Antibody Screen    Negative  


 


Crossmatch    See Detail  














- Radiology Data


Radiology results: image reviewed


interpreted by me: 





Chest x-ray shows mild cardiomegaly and bilateral interstitial edema.  No 

pneumothorax or widened mediastinum.





- Medical Decision Making





This patient presents to the emergency department after missing his last 2 

dialysis sessions, and was told to come to the emergency department secondary to

some recently low hemoglobin.  Hemoglobin today was found to be 6.2.  The 

patient has elevated BUN and creatinine without significant elevation in his 

potassium level.  Nephrology was contacted and consulted and the patient will 

receive dialysis today.  I have ordered 1 unit of packed red blood cells for 

transfusion.  Patient will be admitted to the hospital for further evaluation 

and treatment was accepted for admission by the hospitalist, Dr. Bernal.


Critical Care Time: No


Critical care attestation.: 


If time is entered above; I have spent that time in minutes in the direct care 

of this critically ill patient, excluding procedure time.








ED Disposition


Clinical Impression: 


 ESRD needing dialysis, Anemia requiring transfusions, Missed dialysis





Fluid overload


Qualifiers:


 Hypervolemia type: unspecified Qualified Code(s): E87.70 - Fluid overload, 

unspecified





Disposition: 09 ADMITTED AS INPATIENT


Is pt being admited?: Yes


Condition: Fair


Time of Disposition: 11:41

## 2021-11-04 NOTE — ELECTROCARDIOGRAPH REPORT
[facility]

                                       

Test Date:    2021               Test Time:    13:29:10

Pat Name:     RONEY RODRIGUEZ             Department:   

Patient ID:   [id number]          Room:         [facility]

Gender:       M                        Technician:   NURSE

:          1970               Requested By: JUAN MAZA

Order Number: [id number]              Reading MD:   Gui Hernandez

                                 Measurements

Intervals                              Axis          

Rate:         87                       P:            13

AZ:           168                      QRS:          53

QRSD:         94                       T:            64

QT:           351                                    

QTc:          422                                    

                           Interpretive Statements

Sinus rhythm

Compared to ECG 2021 05:45:17

First degree AV block no longer present

Left ventricular hypertrophy no longer present

Electronically Signed On 2021 9:13:32 EDT by Gui Hernandez

## 2021-11-05 NOTE — HISTORY AND PHYSICAL REPORT
History of Present Illness


Date of examination: 11/03/21


Date of admission: 


11/03/21 11:41





Chief complaint: 





Anemia


History of present illness: 





51-year-old -American male presents to the emergency department via EMS 

from home after he was contacted by his dialysis clinic and nephrologist, Dr. Naranjo, to go to the emergency department for a blood transfusion.  The patient 

was seen here on 10/25, and then again 2 days ago on 11/1, regarding some left 

upper extremity pain and swelling after a AV graft revision that caused a 

seroma.  Patient had some blood work 2 days ago that showed a hemoglobin of 6.2.

 It appears that the nephrologist became aware of this hemoglobin level and the 

dialysis clinic felt that it was too low to get dialysis today.  He has end-

stage renal disease on hemodialysis on Monday/Wednesday/Friday and last had 

dialysis on Friday, 5 days ago.  The patient also has a history of hypertension,

atrial fibrillation on anticoagulation, coronary artery disease with previous 

MI.  The patient just complains of some shortness of breath, but denies any 

chest pain, lower extremity swelling, fever, nausea, vomiting, cough, back pain.











- Past Medical History


--Hypertension: Yes


--Renal Disease: Yes (HD MWF - last on 10/18/21)


--Additional medical history: Glaucoma.  afib





- Surgical History


--Pacemaker: No


--Additional Surgical History: Left A/V Graft





- Social History


--Smoking Status: Never Smoker


--Substance Use Type: Alcohol





- Medications


Home Medications: 


                                Home Medications











 Medication  Instructions  Recorded  Confirmed  Last Taken  Type


 


Epoetin Amadeo-Epbx 10,000 Unit 10,000 unit IV QUITA PRN  vial 09/16/21 10/25/21 

10/22/21 19:00 Rx





[Retacrit]     


 


NIFEdipine XL [Procardia Xl] 90 mg PO QDAY #30 tablet 10/21/21 10/25/21 10/25/21

07:00 Rx


 


carvediloL [Coreg] 25 mg PO QDAY #30 tablet 10/21/21 10/25/21 10/25/21 07:00 Rx


 


cloNIDine [Catapres] 0.1 mg PO Q12HR #60 tablet 10/21/21 10/25/21 10/25/21 07:00

Rx


 


hydrALAZINE [Apresoline TAB] 100 mg PO TID #90 tab 10/21/21 10/25/21 10/25/21 

07:00 Rx


 


Cinacalcet [Sensipar] 30 mg PO DAILY 10/25/21 10/25/21 10/25/21 07:00 History


 


Pantoprazole [Protonix TAB] 40 mg PO DAILY 10/25/21 10/25/21 10/24/21 09:00 

History


 


traMADoL [Ultram 50 MG tab] 25 mg PO Q6HR PRN #7 tablet 11/01/21  Unknown Rx














Review of Systems


ROS: 


Stated complaint: ROSALBA/DIALYSIS


Other details as noted in HPI





Comment: All other systems reviewed and negative


Constitutional: denies: chills, fever


Eyes: denies: eye pain, vision change


ENT: denies: ear pain, throat pain


Respiratory: shortness of breath.  denies: cough


Cardiovascular: denies: chest pain, edema


Gastrointestinal: denies: abdominal pain, vomiting


Genitourinary: denies: dysuria, discharge


Musculoskeletal: denies: back pain, arthralgia


Skin: denies: rash, lesions


Neurological: denies: headache, weakness








Medications and Allergies


                                    Allergies











Allergy/AdvReac Type Severity Reaction Status Date / Time


 


diphenhydramine AdvReac Mild falls out Verified 11/16/21 11:52





[From Benadryl]   and starts  





   shaking  











                                Home Medications











 Medication  Instructions  Recorded  Confirmed  Last Taken  Type


 


NIFEdipine XL [Procardia Xl] 90 mg PO QDAY #30 tablet 10/21/21 11/16/21 11/15/21

Rx


 


carvediloL [Coreg] 25 mg PO QDAY #30 tablet 10/21/21 11/16/21 11/15/21 Rx


 


cloNIDine [Catapres] 0.1 mg PO Q12HR #60 tablet 10/21/21 11/16/21 11/15/21 Rx


 


hydrALAZINE [Apresoline TAB] 100 mg PO TID #90 tab 10/21/21 11/16/21 11/15/21 Rx


 


Pantoprazole [Protonix TAB] 40 mg PO DAILY 10/25/21 11/16/21 11/15/21 History


 


Cinacalcet [Sensipar] 30 mg PO DAILY #30 tablet 11/17/21  Unknown Rx


 


Furosemide [Lasix TAB] 40 mg PO QDAY #30 tab 11/17/21  Unknown Rx














Exam





- Constitutional


Vitals: 


                                        











Temp Pulse Resp BP Pulse Ox


 


 98.9 F   95 H  20   172/89   98 


 


 11/03/21 18:37  11/03/21 18:37  11/03/21 18:37  11/03/21 18:37  11/03/21 18:37











General appearance: Present: no acute distress, well-nourished





- EENT


Eyes: Present: PERRL


ENT: hearing intact, clear oral mucosa





- Neck


Neck: Present: supple, normal ROM





- Respiratory


Respiratory effort: normal


Respiratory: bilateral: CTA





- Cardiovascular


Heart rate: 78


Rhythm: regular


Heart Sounds: Present: S1 & S2.  Absent: rub, click





- Extremities


Extremities: pulses symmetrical, No edema


Peripheral Pulses: within normal limits





- Abdominal


General gastrointestinal: Present: soft, non-tender, non-distended, normal bowel

sounds


Male genitourinary: Present: normal





- Integumentary


Integumentary: Present: clear, warm, dry





- Musculoskeletal


Musculoskeletal: gait normal, strength equal bilaterally





- Psychiatric


Psychiatric: appropriate mood/affect, intact judgment & insight





- Neurologic


Neurologic: CNII-XII intact, moves all extremities





Results





- Labs


CBC & Chem 7: 


                                 11/03/21 09:06





                                 11/03/21 09:06


Labs: 


                             Laboratory Last Values











WBC  8.5 K/mm3 (4.5-11.0)   11/03/21  09:06    


 


RBC  2.01 M/mm3 (3.65-5.03)  L  11/03/21  09:06    


 


Hgb  6.2 gm/dl (11.8-15.2)  L  11/03/21  09:06    


 


Hct  19.4 % (35.5-45.6)  L*  11/03/21  09:06    


 


MCV  97 fl (84-94)  H  11/03/21  09:06    


 


MCH  31 pg (28-32)   11/03/21  09:06    


 


MCHC  32 % (32-34)   11/03/21  09:06    


 


RDW  17.5 % (13.2-15.2)  H  11/03/21  09:06    


 


Plt Count  230 K/mm3 (140-440)   11/03/21  09:06    


 


Add Manual Diff  Complete   11/03/21  09:06    


 


Total Counted  100   11/03/21  09:06    


 


Seg Neuts % (Manual)  84.0 % (40.0-70.0)  H  11/03/21  09:06    


 


Band Neutrophils %  1.0 %  11/03/21  09:06    


 


Lymphocytes % (Manual)  12.0 % (13.4-35.0)  L  11/03/21  09:06    


 


Monocytes % (Manual)  1.0 % (0.0-7.3)   11/03/21  09:06    


 


Eosinophils % (Manual)  1.0 % (0.0-4.3)   11/03/21  09:06    


 


Myelocytes %  1.0 %  11/03/21  09:06    


 


Nucleated RBC %  1.0 % (0.0-0.9)  H  11/03/21  09:06    


 


Seg Neutrophils # Man  7.1 K/mm3 (1.8-7.7)   11/03/21  09:06    


 


Band Neutrophils #  0.1 K/mm3  11/03/21  09:06    


 


Lymphocytes # (Manual)  1.0 K/mm3 (1.2-5.4)  L  11/03/21  09:06    


 


Abs React Lymphs (Man)  0.0 K/mm3  11/03/21  09:06    


 


Monocytes # (Manual)  0.1 K/mm3 (0.0-0.8)   11/03/21  09:06    


 


Eosinophils # (Manual)  0.1 K/mm3 (0.0-0.4)   11/03/21  09:06    


 


Basophils # (Manual)  0.0 K/mm3 (0.0-0.1)   11/03/21  09:06    


 


Metamyelocytes #  0.0 K/mm3  11/03/21  09:06    


 


Myelocytes #  0.1 K/mm3  11/03/21  09:06    


 


Promyelocytes #  0.0 K/mm3  11/03/21  09:06    


 


Blast Cells #  0.0 K/mm3  11/03/21  09:06    


 


WBC Morphology  Not Reportable   11/03/21  09:06    


 


Hypersegmented Neuts  Not Reportable   11/03/21  09:06    


 


Hyposegmented Neuts  Not Reportable   11/03/21  09:06    


 


Hypogranular Neuts  Not Reportable   11/03/21  09:06    


 


Smudge Cells  Not Reportable   11/03/21  09:06    


 


Toxic Granulation  Not Reportable   11/03/21  09:06    


 


Toxic Vacuolation  Not Reportable   11/03/21  09:06    


 


Dohle Bodies  Not Reportable   11/03/21  09:06    


 


Pelger-Huet Anomaly  Not Reportable   11/03/21  09:06    


 


Nel Rods  Not Reportable   11/03/21  09:06    


 


Platelet Estimate  Consistent w auto   11/03/21  09:06    


 


Clumped Platelets  Not Reportable   11/03/21  09:06    


 


Plt Clumps, EDTA  Not Reportable   11/03/21  09:06    


 


Large Platelets  Not Reportable   11/03/21  09:06    


 


Giant Platelets  Not Reportable   11/03/21  09:06    


 


Platelet Satelliting  Not Reportable   11/03/21  09:06    


 


Plt Morphology Comment  Not Reportable   11/03/21  09:06    


 


RBC Morphology  Not Reportable   11/03/21  09:06    


 


Dimorphic RBCs  Not Reportable   11/03/21  09:06    


 


Polychromasia  1+   11/03/21  09:06    


 


Hypochromasia  2+   11/03/21  09:06    


 


Poikilocytosis  Not Reportable   11/03/21  09:06    


 


Anisocytosis  Not Reportable   11/03/21  09:06    


 


Microcytosis  Not Reportable   11/03/21  09:06    


 


Macrocytosis  Not Reportable   11/03/21  09:06    


 


Spherocytes  Not Reportable   11/03/21  09:06    


 


Pappenheimer Bodies  Not Reportable   11/03/21  09:06    


 


Sickle Cells  Not Reportable   11/03/21  09:06    


 


Target Cells  Not Reportable   11/03/21  09:06    


 


Tear Drop Cells  Not Reportable   11/03/21  09:06    


 


Ovalocytes  1+   11/03/21  09:06    


 


Helmet Cells  Not Reportable   11/03/21  09:06    


 


Meredith-[place] Bodies  Not Reportable   11/03/21  09:06    


 


Cabot Rings  Not Reportable   11/03/21  09:06    


 


Branch Cells  Not Reportable   11/03/21  09:06    


 


Bite Cells  Not Reportable   11/03/21  09:06    


 


Crenated Cell  Not Reportable   11/03/21  09:06    


 


Elliptocytes  Not Reportable   11/03/21  09:06    


 


Acanthocytes (Spur)  Not Reportable   11/03/21  09:06    


 


Rouleaux  Not Reportable   11/03/21  09:06    


 


Hemoglobin C Crystals  Not Reportable   11/03/21  09:06    


 


Schistocytes  Not Reportable   11/03/21  09:06    


 


Malaria parasites  Not Reportable   11/03/21  09:06    


 


Behzad Bodies  Not Reportable   11/03/21  09:06    


 


Hem Pathologist Commnt  No   11/03/21  09:06    


 


Sodium  135 mmol/L (137-145)  L  11/03/21  09:06    


 


Potassium  4.7 mmol/L (3.6-5.0)   11/03/21  09:06    


 


Chloride  92.1 mmol/L ()  L  11/03/21  09:06    


 


Carbon Dioxide  25 mmol/L (22-30)   11/03/21  09:06    


 


Anion Gap  23 mmol/L  11/03/21  09:06    


 


BUN  100 mg/dL (9-20)  H  11/03/21  09:06    


 


Creatinine  11.1 mg/dL (0.8-1.3)  H  11/03/21  09:06    


 


Estimated GFR  6 ml/min  11/03/21  09:06    


 


BUN/Creatinine Ratio  9 %  11/03/21  09:06    


 


Glucose  135 mg/dL ()  H  11/03/21  09:06    


 


Calcium  9.6 mg/dL (8.4-10.2)   11/03/21  09:06    


 


Blood Type  O POSITIVE   11/03/21  09:15    


 


Antibody Screen  Negative   11/03/21  09:15    


 


Crossmatch  See Detail   11/03/21  09:15    














Assessment and Plan


Advance Directives: Yes (Full code)


VTE prophylaxis?: Chemical


Plan of care discussed with patient/family: Yes





- Patient Problems


(1) Anemia due to end stage renal disease


Status: Acute   


Plan to address problem: 


Transfuse 1 to 2 units of packed red blood cells


Continue Epogen








(2) End-stage renal disease on hemodialysis


Status: Chronic   


Plan to address problem: 


Continue hemodialysis as per schedule








(3) T2DM (type 2 diabetes mellitus)


Status: Chronic   


Qualifiers: 


   Diabetes mellitus long term insulin use: unspecified long term insulin use 

status 


Plan to address problem: 


Coverage for now and check hemoglobin A1c








(4) Hypertension


Status: Chronic   


Qualifiers: 


   Hypertension type: primary hypertension   Qualified Code(s): I10 - Essential 

(primary) hypertension   


Plan to address problem: 


Continue antihypertensives and adjust medications








(5) DVT prophylaxis


Status: Acute   


Plan to address problem: 


On anticoagulation GI prophylaxis

## 2021-11-05 NOTE — DISCHARGE SUMMARY
Providers





- Providers


Date of Admission: 


11/03/21 11:41





Date of discharge: 11/03/21


Attending physician: 


REGINALD ARGUELLO





                                        





11/03/21 11:40


Consult to Physician [CONS] Routine 


   Comment: 


   Consulting Provider: PADMINI NARANJO


   Physician Instructions: 


   Reason For Exam: ESRD needing dialysis





11/03/21 12:11


Consult to Physician [CONS] Routine 


   Comment: 


   Consulting Provider: MALIK AL


   Physician Instructions: 


   Reason For Exam: GI bleed needs egd and colonoscopy











Primary care physician: 


PRIMARY CARE MD








Hospitalization


Condition: Fair


Hospital course: 





51-year-old -American male presents to the emergency department via EMS 

from home after he was contacted by his dialysis clinic and nephrologist, Dr. Naranjo, to go to the emergency department for a blood transfusion.  The patient 

was seen here on 10/25, and then again 2 days ago on 11/1, regarding some left 

upper extremity pain and swelling after a AV graft revision that caused a ser

rai.  Patient had some blood work 2 days ago that showed a hemoglobin of 6.2.  

It appears that the nephrologist became aware of this hemoglobin level and the 

dialysis clinic felt that it was too low to get dialysis today.  He has end-

stage renal disease on hemodialysis on Monday/Wednesday/Friday and last had 

dialysis on Friday, 5 days ago.  The patient also has a history of hypertension,

 atrial fibrillation on anticoagulation, coronary artery disease with previous 

MI.  The patient just complains of some shortness of breath, but denies any 

chest pain, lower extremity swelling, fever, nausea, vomiting, cough, back pain.





Assessment and Plan


Advance Directives: Yes (Full code)


VTE prophylaxis?: Chemical


Plan of care discussed with patient/family: Yes





- Patient Problems


(1) Anemia due to end stage renal disease


Status: Acute   


Plan to address problem: 


Transfuse 1 to 2 units of packed red blood cells


Continue Epogen








(2) End-stage renal disease on hemodialysis


Status: Chronic   


Plan to address problem: 


Continue hemodialysis as per schedule








(3) T2DM (type 2 diabetes mellitus)


Status: Chronic   


Qualifiers: 


   Diabetes mellitus long term insulin use: unspecified long term insulin use 

status 


Plan to address problem: 


Coverage for now and check hemoglobin A1c








(4) Hypertension


Status: Chronic   


Qualifiers: 


   Hypertension type: primary hypertension   Qualified Code(s): I10 - Essential 

(primary) hypertension   


Plan to address problem: 


Continue antihypertensives and adjust medications











Disposition: 01 HOME / SELF CARE / HOMELESS


Final Discharge Diagnosis (Prints w/discharge instructions): Symptomatic anemia.

  End-stage renal disease on hemodialysis.  Hypertension.  T2DM


Time spent for discharge: 35 minutes





- Discharge Diagnoses


(1) Anemia due to end stage renal disease


Status: Acute   





(2) End-stage renal disease on hemodialysis


Status: Chronic   





(3) T2DM (type 2 diabetes mellitus)


Status: Chronic   


Qualifiers: 


   Diabetes mellitus long term insulin use: unspecified long term insulin use 

status 





(4) Hypertension


Status: Chronic   


Qualifiers: 


   Hypertension type: primary hypertension   Qualified Code(s): I10 - Essential 

(primary) hypertension   





(5) DVT prophylaxis


Status: Acute   





Core Measure Documentation





- Palliative Care


Palliative Care/ Comfort Measures: Not Applicable





- Core Measures


Any of the following diagnoses?: none





Exam





- Constitutional


Vitals: 


                                        











Temp Pulse Resp BP Pulse Ox


 


 98.9 F   95 H  20   172/89   98 


 


 11/03/21 18:37  11/03/21 18:37  11/03/21 18:37  11/03/21 18:37  11/03/21 18:37











General appearance: Present: no acute distress, well-nourished





- EENT


Eyes: Present: PERRL


ENT: hearing intact, clear oral mucosa





- Neck


Neck: Present: supple, normal ROM





- Respiratory


Respiratory effort: normal


Respiratory: bilateral: CTA





- Cardiovascular


Heart rate: 78


Rhythm: regular


Heart Sounds: Present: S1 & S2.  Absent: rub, click





- Extremities


Extremities: pulses symmetrical, No edema


Peripheral Pulses: within normal limits





- Abdominal


General gastrointestinal: Present: soft, non-tender, non-distended, normal bowel

 sounds


Male genitourinary: Present: normal





- Rectal


Rectal Exam: deferred





- Integumentary


Integumentary: Present: clear, warm, dry





- Musculoskeletal


Musculoskeletal: gait normal, strength equal bilaterally





- Psychiatric


Psychiatric: appropriate mood/affect, intact judgment & insight





- Neurologic


Neurologic: CNII-XII intact, moves all extremities





- Allied Health


Allied health notes reviewed: nursing, case management





Plan


Activity: no restrictions


Diet: renal


Follow up with: 


PRIMARY CARE,MD [Primary Care Provider] - 3-5 Days

## 2021-11-10 ENCOUNTER — HOSPITAL ENCOUNTER (EMERGENCY)
Dept: HOSPITAL 5 - ED | Age: 51
Discharge: HOME | End: 2021-11-10
Payer: MEDICARE

## 2021-11-10 VITALS — SYSTOLIC BLOOD PRESSURE: 146 MMHG | DIASTOLIC BLOOD PRESSURE: 78 MMHG

## 2021-11-10 DIAGNOSIS — I12.9: ICD-10-CM
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
## 2021-11-10 DIAGNOSIS — I82.622: Primary | ICD-10-CM
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
## 2021-11-10 DIAGNOSIS — Z88.8: ICD-10-CM

## 2021-11-10 DIAGNOSIS — T82.898A: ICD-10-CM

## 2021-11-10 DIAGNOSIS — J45.909: ICD-10-CM

## 2021-11-10 DIAGNOSIS — N18.9: ICD-10-CM

## 2021-11-10 LAB
ALBUMIN SERPL-MCNC: 3.9 G/DL (ref 3.9–5)
ALT SERPL-CCNC: 11 UNITS/L (ref 7–56)
APTT BLD: 31.2 SEC. (ref 24.2–36.6)
BASOPHILS # (AUTO): 0.1 K/MM3 (ref 0–0.1)
BASOPHILS NFR BLD AUTO: 1.2 % (ref 0–1.8)
BUN SERPL-MCNC: 75 MG/DL (ref 9–20)
BUN/CREAT SERPL: 10 %
CALCIUM SERPL-MCNC: 10.1 MG/DL (ref 8.4–10.2)
EOSINOPHIL # BLD AUTO: 0.3 K/MM3 (ref 0–0.4)
EOSINOPHIL NFR BLD AUTO: 4.3 % (ref 0–4.3)
HCT VFR BLD CALC: 22.8 % (ref 35.5–45.6)
HEMOLYSIS INDEX: 0
HGB BLD-MCNC: 7.5 GM/DL (ref 11.8–15.2)
INR PPP: 0.99 (ref 0.87–1.13)
LYMPHOCYTES # BLD AUTO: 0.9 K/MM3 (ref 1.2–5.4)
LYMPHOCYTES NFR BLD AUTO: 13 % (ref 13.4–35)
MCHC RBC AUTO-ENTMCNC: 33 % (ref 32–34)
MCV RBC AUTO: 93 FL (ref 84–94)
MONOCYTES # (AUTO): 0.8 K/MM3 (ref 0–0.8)
MONOCYTES % (AUTO): 11.5 % (ref 0–7.3)
PLATELET # BLD: 224 K/MM3 (ref 140–440)
RBC # BLD AUTO: 2.46 M/MM3 (ref 3.65–5.03)

## 2021-11-10 PROCEDURE — 85025 COMPLETE CBC W/AUTO DIFF WBC: CPT

## 2021-11-10 PROCEDURE — 36415 COLL VENOUS BLD VENIPUNCTURE: CPT

## 2021-11-10 PROCEDURE — 85730 THROMBOPLASTIN TIME PARTIAL: CPT

## 2021-11-10 PROCEDURE — 80053 COMPREHEN METABOLIC PANEL: CPT

## 2021-11-10 PROCEDURE — 85610 PROTHROMBIN TIME: CPT

## 2021-11-10 PROCEDURE — 96376 TX/PRO/DX INJ SAME DRUG ADON: CPT

## 2021-11-10 PROCEDURE — 96365 THER/PROPH/DIAG IV INF INIT: CPT

## 2021-11-10 PROCEDURE — 96366 THER/PROPH/DIAG IV INF ADDON: CPT

## 2021-11-10 PROCEDURE — 99284 EMERGENCY DEPT VISIT MOD MDM: CPT

## 2021-11-10 PROCEDURE — 93971 EXTREMITY STUDY: CPT

## 2021-11-10 NOTE — VASCULAR LAB REPORT
Left upper extremity venous Ultrasound



HISTORY: left arm swelling.



TECHNIQUE:  Grayscale and color  imaging performed.



COMPARISON:  None



FINDINGS: The left internal jugular vein is occluded. The subclavian, axillary, brachial, ulnar, and 
radial veins are all patent.



There is an AV fistula in the upper arm which is occluded and dilated measuring up to 5.1 cm in maxim
al transverse dimension.





IMPRESSION: 

1. Occlusive DVT in the left IJ.

2. Occluded and dilated left upper arm AV fistula.





Findings were called to the patient's nurse Ivy by the sonographer at 10:47 AM Eastern standard time.




Signer Name: Herbie Bo MD 

Signed: 11/10/2021 11:10 AM

Workstation Name: [id number]

## 2021-12-12 ENCOUNTER — HOSPITAL ENCOUNTER (OUTPATIENT)
Dept: HOSPITAL 5 - ED | Age: 51
Setting detail: OBSERVATION
LOS: 1 days | Discharge: HOME | End: 2021-12-13
Attending: INTERNAL MEDICINE | Admitting: INTERNAL MEDICINE
Payer: MEDICARE

## 2021-12-12 DIAGNOSIS — I51.7: ICD-10-CM

## 2021-12-12 DIAGNOSIS — I48.20: ICD-10-CM

## 2021-12-12 DIAGNOSIS — R93.1: ICD-10-CM

## 2021-12-12 DIAGNOSIS — Z99.2: ICD-10-CM

## 2021-12-12 DIAGNOSIS — J18.9: ICD-10-CM

## 2021-12-12 DIAGNOSIS — D63.1: ICD-10-CM

## 2021-12-12 DIAGNOSIS — N18.6: ICD-10-CM

## 2021-12-12 DIAGNOSIS — E87.70: ICD-10-CM

## 2021-12-12 DIAGNOSIS — Z79.899: ICD-10-CM

## 2021-12-12 DIAGNOSIS — I12.0: ICD-10-CM

## 2021-12-12 DIAGNOSIS — J45.909: ICD-10-CM

## 2021-12-12 DIAGNOSIS — R31.9: ICD-10-CM

## 2021-12-12 DIAGNOSIS — Z98.890: ICD-10-CM

## 2021-12-12 DIAGNOSIS — Z20.822: ICD-10-CM

## 2021-12-12 DIAGNOSIS — K62.5: ICD-10-CM

## 2021-12-12 DIAGNOSIS — J96.01: Primary | ICD-10-CM

## 2021-12-12 LAB
ALBUMIN SERPL-MCNC: 3.7 G/DL (ref 3.9–5)
ALT SERPL-CCNC: 14 UNITS/L (ref 7–56)
APTT BLD: 31.7 SEC. (ref 24.2–36.6)
BASOPHILS # (AUTO): 0.1 K/MM3 (ref 0–0.1)
BASOPHILS NFR BLD AUTO: 0.9 % (ref 0–1.8)
BUN SERPL-MCNC: 51 MG/DL (ref 9–20)
BUN/CREAT SERPL: 7 %
CALCIUM SERPL-MCNC: 10 MG/DL (ref 8.4–10.2)
CRP SERPL-MCNC: 2.7 MG/DL (ref 0–1.3)
EOSINOPHIL # BLD AUTO: 0.3 K/MM3 (ref 0–0.4)
EOSINOPHIL NFR BLD AUTO: 3.3 % (ref 0–4.3)
HCT VFR BLD CALC: 26.2 % (ref 35.5–45.6)
HEMOLYSIS INDEX: 2
HGB BLD-MCNC: 8.3 GM/DL (ref 11.8–15.2)
INR PPP: 1.17 (ref 0.87–1.13)
LYMPHOCYTES # BLD AUTO: 0.7 K/MM3 (ref 1.2–5.4)
LYMPHOCYTES NFR BLD AUTO: 9.1 % (ref 13.4–35)
MCHC RBC AUTO-ENTMCNC: 32 % (ref 32–34)
MCV RBC AUTO: 94 FL (ref 84–94)
MONOCYTES # (AUTO): 0.7 K/MM3 (ref 0–0.8)
MONOCYTES % (AUTO): 9.9 % (ref 0–7.3)
PLATELET # BLD: 235 K/MM3 (ref 140–440)
RBC # BLD AUTO: 2.79 M/MM3 (ref 3.65–5.03)

## 2021-12-12 PROCEDURE — 96372 THER/PROPH/DIAG INJ SC/IM: CPT

## 2021-12-12 PROCEDURE — 83735 ASSAY OF MAGNESIUM: CPT

## 2021-12-12 PROCEDURE — 99285 EMERGENCY DEPT VISIT HI MDM: CPT

## 2021-12-12 PROCEDURE — 80053 COMPREHEN METABOLIC PANEL: CPT

## 2021-12-12 PROCEDURE — 86140 C-REACTIVE PROTEIN: CPT

## 2021-12-12 PROCEDURE — 87040 BLOOD CULTURE FOR BACTERIA: CPT

## 2021-12-12 PROCEDURE — 85025 COMPLETE CBC W/AUTO DIFF WBC: CPT

## 2021-12-12 PROCEDURE — U0003 INFECTIOUS AGENT DETECTION BY NUCLEIC ACID (DNA OR RNA); SEVERE ACUTE RESPIRATORY SYNDROME CORONAVIRUS 2 (SARS-COV-2) (CORONAVIRUS DISEASE [COVID-19]), AMPLIFIED PROBE TECHNIQUE, MAKING USE OF HIGH THROUGHPUT TECHNOLOGIES AS DESCRIBED BY CMS-2020-01-R: HCPCS

## 2021-12-12 PROCEDURE — 96374 THER/PROPH/DIAG INJ IV PUSH: CPT

## 2021-12-12 PROCEDURE — G0257 UNSCHED DIALYSIS ESRD PT HOS: HCPCS

## 2021-12-12 PROCEDURE — 71045 X-RAY EXAM CHEST 1 VIEW: CPT

## 2021-12-12 PROCEDURE — 96376 TX/PRO/DX INJ SAME DRUG ADON: CPT

## 2021-12-12 PROCEDURE — 84484 ASSAY OF TROPONIN QUANT: CPT

## 2021-12-12 PROCEDURE — 93005 ELECTROCARDIOGRAM TRACING: CPT

## 2021-12-12 PROCEDURE — 80048 BASIC METABOLIC PNL TOTAL CA: CPT

## 2021-12-12 PROCEDURE — 85730 THROMBOPLASTIN TIME PARTIAL: CPT

## 2021-12-12 PROCEDURE — G0378 HOSPITAL OBSERVATION PER HR: HCPCS

## 2021-12-12 PROCEDURE — 96375 TX/PRO/DX INJ NEW DRUG ADDON: CPT

## 2021-12-12 PROCEDURE — 82140 ASSAY OF AMMONIA: CPT

## 2021-12-12 PROCEDURE — 85379 FIBRIN DEGRADATION QUANT: CPT

## 2021-12-12 PROCEDURE — 83615 LACTATE (LD) (LDH) ENZYME: CPT

## 2021-12-12 PROCEDURE — 83880 ASSAY OF NATRIURETIC PEPTIDE: CPT

## 2021-12-12 PROCEDURE — 85610 PROTHROMBIN TIME: CPT

## 2021-12-12 PROCEDURE — 36415 COLL VENOUS BLD VENIPUNCTURE: CPT

## 2021-12-12 PROCEDURE — 84145 PROCALCITONIN (PCT): CPT

## 2021-12-12 RX ADMIN — HYDRALAZINE HYDROCHLORIDE SCH MG: 100 TABLET, FILM COATED ORAL at 17:55

## 2021-12-12 RX ADMIN — OXYCODONE HYDROCHLORIDE AND ACETAMINOPHEN SCH MG: 500 TABLET ORAL at 22:08

## 2021-12-12 RX ADMIN — HEPARIN SODIUM SCH UNIT: 5000 INJECTION, SOLUTION INTRAVENOUS; SUBCUTANEOUS at 22:07

## 2021-12-12 RX ADMIN — Medication SCH MG: at 22:08

## 2021-12-12 RX ADMIN — HYDRALAZINE HYDROCHLORIDE SCH MG: 100 TABLET, FILM COATED ORAL at 22:09

## 2021-12-12 RX ADMIN — Medication SCH ML: at 22:09

## 2021-12-12 NOTE — XRAY REPORT
CHEST 1 VIEW 12/12/2021 9:33 AM



INDICATION / CLINICAL INFORMATION: Dyspnea.



COMPARISON: 12/8/2021



FINDINGS:



SUPPORT DEVICES: Permacath appears unchanged

HEART / MEDIASTINUM: There is enlargement of the cardiac silhouette. Atherosclerotic calcifications a
re noted in the aortic arch 

LUNGS / PLEURA: Bilateral pulmonary opacities persist. Given differences in technique there is no sig
nificant change. No pneumothorax. 



ADDITIONAL FINDINGS: No significant additional findings.



IMPRESSION:

1. No significant change.



Signer Name: Niranjan Israel MD 

Signed: 12/12/2021 9:48 AM

Workstation Name: [id number]

## 2021-12-12 NOTE — HISTORY AND PHYSICAL REPORT
History of Present Illness


Chief complaint: 


I feel bad





History of present illness: 


52 YO Male with ESRD on HD(M,W,F), GERD, HTN, Paroxysmal Atrial Fib not on 

therapeutic anticoagulation presents to ED for evaluation.  Patient reports "I 

feel bad".  Patient states that he has experienced generalized weakness, 

fatigue, malaise, body aches, and shortness of breath over the past 3 days with 

persistent symptoms over the same timeframe.  Patient also reports blood in 

urine which has now resolved.  EMS notified and upon arrival the patient was 

found to be in distress and subsequent transported to [facility] for further care and 

evaluation of the aforementioned symptoms.  The patient was seen and evaluated 

in the emergency department.  All lab and imaging studies reviewed.  Patient 

found to have a pulse oximetry of 89% on room air which is consistent with acute

hypoxemic respiratory failure.  Chest x-ray revealed bilateral pneumonia.  

Patient admitted to medical floor and initiated on pneumonia protocol as well as

coronavirus protocol.  Patient has fever, chills, chest pain, palpitation, 

productive cough, skin rash, recent ill contacts, or known exposure to COVID-19.

 Prior admission on 12/8/2021 reviewed.  All medication listed at time of 

admission has been reconciled.  Advanced care planning conducted in ED.











Past History


Past Medical History: atrial fib, ESRD, GERD, hypertension


Past Surgical History: Other (Dialysis access)


Social history: single.  denies: smoking, alcohol abuse, prescription drug abuse


Family history: diabetes, hypertension





Medications and Allergies


                                    Allergies











Allergy/AdvReac Type Severity Reaction Status Date / Time


 


diphenhydramine AdvReac Mild fainting Verified 12/08/21 11:39











                                Home Medications











 Medication  Instructions  Recorded  Confirmed  Last Taken  Type


 


NIFEdipine XL [Procardia Xl] 90 mg PO QDAY #30 tablet 10/21/21 12/08/21 12/07/21

Rx


 


cloNIDine [Catapres] 0.1 mg PO Q12HR #60 tablet 10/21/21 12/08/21 12/07/21 Rx


 


hydrALAZINE [Apresoline TAB] 100 mg PO TID #90 tab 10/21/21 12/08/21 12/07/21 Rx


 


Pantoprazole [Protonix TAB] 40 mg PO DAILY 10/25/21 12/08/21 12/07/21 History


 


Cinacalcet [Sensipar] 30 mg PO DAILY #30 tablet 11/17/21 12/08/21 11/24/21 Rx


 


carvediloL [Coreg] 25 mg PO BID 11/29/21 12/08/21 12/07/21 History


 


Furosemide [Lasix TAB] 40 mg PO QDAY #30 tablet 12/09/21  Unknown Rx














Review of Systems


Constitutional: fatigue, weakness, malaise, no weight loss, no weight gain, no 

fever


Ears, nose, mouth and throat: no ear pain, no ear discharge, no tinnitis, no 

nose pain, no nasal discharge


Cardiovascular: shortness of breath, no chest pain, no orthopnea, no palpitati

ons, no rapid/irregular heart beat, no syncope


Respiratory: shortness of breath, no excessive sputum, no hemoptysis, no 

wheezing, no pleurisy


Gastrointestinal: no abdominal pain, no nausea, no vomiting, no diarrhea, no 

constipation


Genitourinary Male: hematuria, no flank pain, no discharge, no urinary 

frequency, no urinary hesitancy


Rectal: no pain, no incontinence, no bleeding


Musculoskeletal: no neck stiffness, no neck pain, no shooting arm pain, no low 

back pain, no shooting leg pain


Integumentary: no rash, no pruritis, no sores, no wounds, no jaundice, no boils


Neurological: no head injury, no transient paralysis, no parathesias, no 

numbness, no tingling, no seizures, no syncope, no ataxia


Psychiatric: no anxiety, no memory loss, no change in sleep habits, no insomnia,

no change in appetite


Endocrine: no cold intolerance, no polyphagia, no excessive thirst, no polyuria,

no nocturia


Hematologic/Lymphatic: no easy bruising


Allergic/Immunologic: no urticaria, no allergic rhinitis, no wheezing, no 

persistent infections, no anaphylaxis, no angioedema





Exam





- Constitutional


Vitals: 


                                        











Temp Pulse Resp BP Pulse Ox


 


 98.0 F   82   16   107/67   99 


 


 12/12/21 09:58  12/12/21 09:51  12/12/21 09:51  12/12/21 09:51  12/12/21 09:51











General appearance: Present: mild distress





- EENT


Eyes: Present: PERRL


ENT: hearing intact, clear oral mucosa





- Neck


Neck: Present: supple, normal ROM





- Respiratory


Respiratory effort: labored, accessory muscle use


Respiratory: bilateral: diminished, rhonchi





- Cardiovascular


Heart Sounds: Present: S1 & S2.  Absent: rub, click





- Extremities


Extremities: pulses symmetrical, No edema


Peripheral Pulses: within normal limits





- Abdominal


General gastrointestinal: Present: soft, non-tender, non-distended, normal bowel

sounds


Male genitourinary: Present: normal





- Integumentary


Integumentary: Present: clear, warm, dry





- Musculoskeletal


Musculoskeletal: gait normal, strength equal bilaterally





- Psychiatric


Psychiatric: appropriate mood/affect, intact judgment & insight





- Neurologic


Neurologic: CNII-XII intact, moves all extremities





HEART Score





- HEART Score


Troponin: 


                                        











Troponin T  0.128 ng/mL (0.00-0.029)  H*  12/12/21  10:00    














Results





- Labs


CBC & Chem 7: 


                                 12/12/21 09:58





                                 12/12/21 10:00


Labs: 


                              Abnormal lab results











  12/12/21 12/12/21 12/12/21 Range/Units





  09:58 10:00 10:00 


 


RBC  2.79 L    (3.65-5.03)  M/mm3


 


Hgb  8.3 L    (11.8-15.2)  gm/dl


 


Hct  26.2 L    (35.5-45.6)  %


 


RDW  18.2 H    (13.2-15.2)  %


 


Lymph % (Auto)  9.1 L    (13.4-35.0)  %


 


Mono % (Auto)  9.9 H    (0.0-7.3)  %


 


Lymph # (Auto)  0.7 L    (1.2-5.4)  K/mm3


 


Seg Neutrophils %  76.8 H    (40.0-70.0)  %


 


PT   16.2 H   (12.2-14.9)  Sec.


 


INR   1.17 H   (0.87-1.13)  


 


Chloride    97.3 L  ()  mmol/L


 


BUN    51 H  (9-20)  mg/dL


 


Creatinine    6.9 H  (0.8-1.3)  mg/dL


 


Glucose    104 H  ()  mg/dL


 


Alkaline Phosphatase    322 H  ()  units/L


 


Troponin T    0.128 H*  (0.00-0.029)  ng/mL


 


Albumin    3.7 L  (3.9-5)  g/dL














Assessment and Plan





- Patient Problems


(1) Acute hypoxemic respiratory failure


Current Visit: Yes   Status: Acute   


Plan to address problem: 


Chest x-ray, supplemental oxygen, pulse oximetry, nebulizer therapy, pulmonary 

toilet.








(2) Pneumonia


Current Visit: Yes   Status: Acute   


Plan to address problem: 


Pneumonia protocol: Chest x-ray, CBC, CMP, IV antibiotic therapy, supplemental 

oxygen, pulse oximetry, nebulizer therapy, blood culture.








(3) Suspected 2019 novel coronavirus infection


Current Visit: Yes   Status: Acute   


Plan to address problem: 


Coronavirus protocol: IV steroid therapy, IV antibiotic therapy, vitamin C 

therapy, vitamin D therapy, zinc therapy, prophylactic anticoagulation








(4) End stage renal disease


Current Visit: Yes   Status: Acute   


Plan to address problem: 


Nephrology team consulted in ED, dialysis as per renal team, avoid nephrotoxic 

agents.








(5) Atrial fibrillation


Current Visit: Yes   Status: Acute   


Plan to address problem: 


Rate control.  Patient not currently taking therapeutic anticoagulation due to 

high risk of bleeding as per cardiology recommendations.








(6) DVT prophylaxis


Current Visit: Yes   Status: Acute   


Plan to address problem: 


SCDs bilateral lower extremities while in bed, prophylactic anticoagulation.








(7) Advance care planning


Current Visit: Yes   Status: Acute   


Plan to address problem: 


Disease education conducted, care plan discussed, diagnoses discussed, prognosis

discussed, care plan discussed, patient acknowledges understanding and agreement

with care plan, +30 minutes.

## 2021-12-12 NOTE — CONSULTATION
History of Present Illness





- Reason for Consult


Consult date: 12/12/21


end stage renal disease


Requesting physician: DEENA AWAD





- History of Present Illness


52 YO Male with ESRD on HD(M,W,F), GERD, HTN, Paroxysmal Atrial Fib not on 

therapeutic anticoagulation presents to ED for evaluation.  





The patient presents to the ER today with a complaint of blood in urine, which 

is painless, bilateral thoracic pain, history of bloody stool, which is now 

resolved, and generalized body aches, malaise and fatigue.  No headache or neck 

pain.  No fever.  No vomiting.  No testicular pain.  No loss of taste or smell.


He undergoes dialysis at [facility] on MWF schedule.


He did have his dialysis treatment on Friday but the treatment was cut short to 

about 2-1/2 hours.  He also did miss his dialysis treatment on 12/8/2021.


Patient does have mild shortness of breath.  However he is able to lay flat and 

currently oxygen saturation is 98% on room air








Past History


Past Medical History: dialysis, hypertension, other (Paroxysmal atrial 

fibrillation)


Past Surgical History: Other (History of creation of AV fistula)


Social history: no significant social history


Family history: no significant family history





Medications and Allergies


                                    Allergies











Allergy/AdvReac Type Severity Reaction Status Date / Time


 


diphenhydramine AdvReac Mild fainting Verified 12/08/21 11:39











                                Home Medications











 Medication  Instructions  Recorded  Confirmed  Last Taken  Type


 


NIFEdipine XL [Procardia Xl] 90 mg PO QDAY #30 tablet 10/21/21 12/08/21 12/07/21

Rx


 


cloNIDine [Catapres] 0.1 mg PO Q12HR #60 tablet 10/21/21 12/08/21 12/07/21 Rx


 


hydrALAZINE [Apresoline TAB] 100 mg PO TID #90 tab 10/21/21 12/08/21 12/07/21 Rx


 


Pantoprazole [Protonix TAB] 40 mg PO DAILY 10/25/21 12/08/21 12/07/21 History


 


Cinacalcet [Sensipar] 30 mg PO DAILY #30 tablet 11/17/21 12/08/21 11/24/21 Rx


 


carvediloL [Coreg] 25 mg PO BID 11/29/21 12/08/21 12/07/21 History


 


Furosemide [Lasix TAB] 40 mg PO QDAY #30 tablet 12/09/21  Unknown Rx











Active Meds: 


Active Medications





Acetaminophen (Acetaminophen 325 Mg Tab)  650 mg PO Q4H PRN


   PRN Reason: Pain MILD(1-3)/Fever >100.5/HA


Albuterol (Albuterol 2.5 Mg/3 Ml Nebu)  2.5 mg IH Q4HRT PRN


   PRN Reason: Shortness Of Breath


Ascorbic Acid (Ascorbic Acid 500 Mg Tab)  500 mg PO BID [facility]


Cholecalciferol (Cholecalciferol (Vit D3) 400 Unit Tab)  1,000 unit PO QDAY [facility]


Cinacalcet (Cinacalcet 30 Mg Tab)  30 mg PO DAILY [facility]


Clonidine HCl (Clonidine 0.1 Mg Tab)  0.1 mg PO Q12HR [facility]


Furosemide (Furosemide 40 Mg Tab)  40 mg PO QDAY [facility]


Heparin Sodium (Porcine) (Heparin 5,000 Unit/1 Ml Vial)  5,000 unit SUB-Q Q12HR 

[facility]


Hydralazine HCl (Hydralazine 100 Mg Tab)  100 mg PO TID [facility]


Hydromorphone HCl (Hydromorphone 1 Mg/1 Ml Inj)  0.5 mg IV Q23H PRN


   PRN Reason: Pain , Severe (7-10)


Ceftriaxone Sodium (Rocephin/Ns 2 Gm/100 Ml)  2 gm in 100 mls @ 200 mls/hr IV 

Q24H [facility]; Protocol


Azithromycin (Zithromax/Ns)  500 mg in 250 mls @ 250 mls/hr IV Q24H [facility]; 

Protocol


Methylprednisolone Sodium Succinate (Methylprednisolone Sod Succinate 40 Mg/1 Ml

Inj)  40 mg IV Q8HR [facility]


Nifedipine (Nifedipine Xl 90 Mg Tab)  90 mg PO QDAY [facility]


Ondansetron HCl (Ondansetron 4 Mg/2 Ml Inj)  4 mg IV Q8H PRN


   PRN Reason: Nausea And Vomiting


Oxycodone/Acetaminophen (Oxycodone /Acetaminophen 5-325mg Tab)  1 tab PO Q16H 

PRN


   PRN Reason: Pain, Moderate (4-6)


Pantoprazole Sodium (Pantoprazole 40 Mg Tab)  40 mg PO DAILY [facility]


Sodium Chloride (Sodium Chloride 0.9% 10 Ml Flush Syringe)  10 ml IV BID [facility]


Sodium Chloride (Sodium Chloride 0.9% 10 Ml Flush Syringe)  10 ml IV PRN PRN


   PRN Reason: LINE FLUSH


Zinc Sulfate (Zinc Sulfate 220 Mg Cap)  220 mg PO BID [facility]











Review of Systems


All systems: negative (Negative except as noted above)





Exam





- Vital Signs


Vital signs: 


                                   Vital Signs











Pulse Resp Pulse Ox


 


 89   18   93 


 


 12/12/21 09:28  12/12/21 09:28  12/12/21 09:28














- General Appearance


General appearance: well-developed, well-nourished, appears stated age


EENT: PERRL, mucous membranes moist


Neck: Present: neck supple, trachea midline, Other (Right IJ PermCath in place).

 Absent: JVD/HJR, Masses


Respiratory: Clear to Ascultation


Heart: irregularly irregular


Gastrointestinal: Present: normal, normoactive bowel sounds


Integumentary: no rash, other (1+ edema.  AV graft in his left upper arm.  Good 

bruit and thrill.)





Results





- Lab Results





                                 12/12/21 09:58





                                 12/12/21 10:00


                             Most recent lab results











Calcium  10.0 mg/dL (8.4-10.2)   12/12/21  10:00    


 


Magnesium  1.80 mg/dL (1.7-2.3)   12/12/21  10:00    














Assessment and Plan


Impression


* End-stage renal disease on maintenance hemodialysis


* Gross hematuria


* Hypertension


* Chronic atrial fibrillation


Recommendations


* No urgent indication for dialysis today.  His electrolytes are acceptable.  

  Although chest x-ray shows some chronic congestive changes, he is not hypoxic 

  and also not orthopneic.  Oxygen saturation is 99% on room air


* Shall schedule patient for hemodialysis treatment for tomorrow


* Keep him on MWF schedule as outpatient


* Work-up for hematuria as per primary team.  Recommend urology evaluation


* Adjust diet and meds for ESRD state


* Patient currently has a PermCath in place.  He also has a left upper arm AV 

  access.  Patient states that he is going to have it evaluated by Dr. Womack 

  before we can use it.


* Epogen with dialysis


* No IV, BP or venipuncture in his access arm


* Adjust diet and meds for ESRD state


* Avoid nephrotoxins


* Thank you very much for the consultation.  Shall follow along with you

## 2021-12-12 NOTE — EMERGENCY DEPARTMENT REPORT
ED General Adult HPI





- General


Chief complaint: Pain General


Stated complaint: Blood in urine, blood in the stool, body aches, bilateral 

thoracic


PUI?: Yes


Time Seen by Provider: 12/12/21 09:25


Source: patient, EMS ( EMS documentation not available at time of chart 

dictation .  Verbal report received from emergency medical service), RN notes 

reviewed, old records reviewed


Mode of arrival: Stretcher


Limitations: Physical Limitation





- History of Present Illness


Initial comments: 





The patient is a 51-year-old gentleman.  He is vaccinated against COVID-19.  He 

has a past medical history of end-stage renal disease, on hemodialysis, Monday, 

Wednesday, Friday.  His last hemodialysis session was this past Friday.  He 

states his nephrologist is Dr. Naranjo.  He also has a history of hypertension, 

paroxysmal A. fib, with a chronically abnormal chest x-ray, with cardiomegaly, 

and bilateral groundglass opacities in his lung fields.





He has recently been seen by [facility] cardiology at this practice, who 

advised against systemic anticoagulation secondary to chronic anemia, with no 

known history of coronary artery disease, with a negative cardiac nuclear stress

test in 2019, which demonstrated an ejection fraction of 45 to 50%.





The patient presents to the ER today with a complaint of blood in urine, which 

is painless, bilateral thoracic pain, history of bloody stool, which is now 

resolved, and generalized body aches, malaise and fatigue.  No headache or neck 

pain.  No fever.  No vomiting.  No testicular pain.  No loss of taste or smell.








-: Sudden, hour(s), days(s)


Location: back, left, right, upper extremity, lower extremity


Quality: aching


Consistency: constant


Improves with: rest


Worsens with: movement





- Related Data


                                Home Medications











 Medication  Instructions  Recorded  Confirmed  Last Taken


 


Pantoprazole [Protonix TAB] 40 mg PO DAILY 10/25/21 12/08/21 12/07/21


 


carvediloL [Coreg] 25 mg PO BID 11/29/21 12/08/21 12/07/21








                                  Previous Rx's











 Medication  Instructions  Recorded  Last Taken  Type


 


NIFEdipine XL [Procardia Xl] 90 mg PO QDAY #30 tablet 10/21/21 12/07/21 Rx


 


cloNIDine [Catapres] 0.1 mg PO Q12HR #60 tablet 10/21/21 12/07/21 Rx


 


hydrALAZINE [Apresoline TAB] 100 mg PO TID #90 tab 10/21/21 12/07/21 Rx


 


Cinacalcet [Sensipar] 30 mg PO DAILY #30 tablet 11/17/21 11/24/21 Rx


 


Furosemide [Lasix TAB] 40 mg PO QDAY #30 tablet 12/09/21 Unknown Rx











                                    Allergies











Allergy/AdvReac Type Severity Reaction Status Date / Time


 


diphenhydramine AdvReac Mild fainting Verified 12/08/21 11:39














ED Review of Systems


ROS: 


Stated complaint: blood in urine


Other details as noted in HPI





Constitutional: malaise, weakness


Eyes: denies: eye discharge


ENT: denies: epistaxis


Respiratory: denies: cough


Cardiovascular: denies: chest pain


Genitourinary: hematuria.  denies: dysuria, testicular pain


Musculoskeletal: back pain, arthralgia, myalgia


Neurological: weakness.  denies: headache


Hematological/Lymphatic: denies: easy bleeding





ED Past Medical Hx





- Past Medical History


Hx Hypertension: Yes


Hx Heart Attack/AMI: No


Hx Congestive Heart Failure: No


Hx Diabetes: No


Hx Liver Disease: No


Hx Renal Disease: Yes (HD MWF - last on 10/18/21)


Hx Asthma: Yes


Hx COPD: No


Additional medical history: Glaucoma.  afib





- Surgical History


Hx Pacemaker: No


Additional Surgical History: Left A/V Graft, right chest Vas-Cath





- Social History


Smoking Status: Unknown if ever smoked





- Medications


Home Medications: 


                                Home Medications











 Medication  Instructions  Recorded  Confirmed  Last Taken  Type


 


NIFEdipine XL [Procardia Xl] 90 mg PO QDAY #30 tablet 10/21/21 12/08/21 12/07/21

Rx


 


cloNIDine [Catapres] 0.1 mg PO Q12HR #60 tablet 10/21/21 12/08/21 12/07/21 Rx


 


hydrALAZINE [Apresoline TAB] 100 mg PO TID #90 tab 10/21/21 12/08/21 12/07/21 Rx


 


Pantoprazole [Protonix TAB] 40 mg PO DAILY 10/25/21 12/08/21 12/07/21 History


 


Cinacalcet [Sensipar] 30 mg PO DAILY #30 tablet 11/17/21 12/08/21 11/24/21 Rx


 


carvediloL [Coreg] 25 mg PO BID 11/29/21 12/08/21 12/07/21 History


 


Furosemide [Lasix TAB] 40 mg PO QDAY #30 tablet 12/09/21  Unknown Rx














ED Physical Exam





- General


Limitations: Physical Limitation


General appearance: alert, anxious





- Head


Head exam: Present: atraumatic, normocephalic





- Eye


Eye exam: Present: normal appearance, EOMI.  Absent: nystagmus





- ENT


ENT exam: Present: normal exam, normal orophraynx, mucous membranes moist, 

normal external ear exam





- Neck


Neck exam: Present: normal inspection, full ROM.  Absent: tenderness, 

meningismus





- Respiratory


Respiratory exam: Present: chest wall tenderness, decreased breath sounds.  

Absent: respiratory distress, wheezes, rales, rhonchi, stridor





- Cardiovascular


Cardiovascular Exam: Present: regular rate, normal rhythm, normal heart sounds. 

Absent: bradycardia, tachycardia, irregular rhythm, systolic murmur, diastolic 

murmur, rubs, gallop





- GI/Abdominal


GI/Abdominal exam: Present: soft.  Absent: distended, tenderness, guarding, 

rebound, rigid, pulsatile mass





- Rectal


Rectal exam: Present: normal inspection, heme (-) stool, other (Chaperoned by TRISTAN Hammer).  Absent: heme (+) stool, black stool, bloody stool





- 


 exam: Present: normal inspection, other (There is normal testicular lie.  T

here is normal cremasteric reflex.  There is no testicular tenderness.  There is

no testicular swelling/ Chaperond by TRISTAN Hammer).  Absent: testicular tenderness


External exam: Present: normal external exam





- Extremities Exam


Extremities exam: Present: normal inspection (Left upper extremity graft, with 

appropriate thrill, no redness, pus or streaking.  Minimally tender.), full ROM,

other (2+ pulses noted in the bilateral upper and lower extremities.  There is 

no palpable cord.   negative Homans sign.  Muscular compartments are soft.  The 

pelvis is stable.).  Absent: calf tenderness





- Back Exam


Back exam: Present: normal inspection, paraspinal tenderness.  Absent: 

tenderness, CVA tenderness (R), CVA tenderness (L), muscle spasm, vertebral 

tenderness





- Neurological Exam


Neurological exam: Present: alert, oriented X3, other (No facial droop.  Tongue 

midline.  Extraocular movements intact bilaterally.  Facial sensation intact to 

light touch in V1, V2, V3 distribution bilaterally.  5 and a 5 strength in 4 

extremities.  Sensation intact to light touch in 4 extremities.).  Absent: motor

sensory deficit





- Psychiatric


Psychiatric exam: Present: anxious





- Skin


Skin exam: Present: warm, dry, intact, normal color, other (There is a right-

sided thoracic vascular access catheter noted, without redness, pus or 

streaking.).  Absent: rash





ED Course


                                   Vital Signs











  12/12/21 12/12/21 12/12/21





  09:28 09:30 09:51


 


Temperature   98.0 F


 


Pulse Rate 89 84 82


 


Respiratory 18 26 H 16





Rate   


 


Blood Pressure   107/67





[Right]   


 


O2 Sat by Pulse 93 93 99





Oximetry   














  12/12/21





  09:58


 


Temperature 98.0 F


 


Pulse Rate 


 


Respiratory 





Rate 


 


Blood Pressure 





[Right] 


 


O2 Sat by Pulse 





Oximetry 














- Reevaluation(s)


Reevaluation #1: 





12/12/21 09:37


Differential diagnosis, including but not limited to: Myositis, viral syndrome, 

COVID-19, pneumonia, congestive heart failure, urinary tract infection, 

diverticulosis, angiodysplasia, malignancy, electrolyte derangement, musculosk

eletal pain





Assessment and plan: 51-year-old gentleman, with diffuse body aches, pains, 

malaise and fatigue, acute hypoxic respiratory failure, saturating 85% on room 

air, COVID-19 vaccinated, up-to-date "with the rest of my shots", presenting 

with the aforementioned.





Place patient on isolation.  Obtain appropriate laboratory studies and x-ray the

 chest.  Obtain EKG, and treat symptoms.  


Plan to discuss with nephrology once initial diagnostic studies have resulted.





Patient declines/refuses digital rectal exam, however, external examination 

unremarkable, patient did provide permission for rectal temperature, which 

demonstrated no blood on the probe tip cover, and the residue on this probe 

cover was guaiac negative.  Reassess after initial data points.  Abdomen soft 

and benign.  He is not currently on systemic anticoagulation


12/12/21 11:21


Laboratory studies reviewed and appreciated.  Elevated troponin is chronic.  

This is likely a type II troponin leak.





Contacted nephrology on-call for the patient's group, Dr Stafford





Discussed the patient's history, physical, laboratory studies, clinical 

impression.  He will follow in consultation.  We will treat this patient 

empirically with antibiotics, steroids, and we will admit to the hospital 

service.  Awaiting callback from hospital physician.


12/12/21 11:43


Admitted to Dr. Brown service under internal medicine service.








12/12/21 11:44


Defer to inpatient team to follow-up on urinalysis.





ED Medical Decision Making





- Lab Data


Result diagrams: 


                                 12/12/21 09:58





                                 12/12/21 10:00








                                   Vital Signs











  12/12/21 12/12/21 12/12/21





  09:28 09:30 09:51


 


Temperature   98.0 F


 


Pulse Rate 89 84 82


 


Respiratory 18 26 H 16





Rate   


 


Blood Pressure   107/67





[Right]   


 


O2 Sat by Pulse 93 93 99





Oximetry   














  12/12/21





  09:58


 


Temperature 98.0 F


 


Pulse Rate 


 


Respiratory 





Rate 


 


Blood Pressure 





[Right] 


 


O2 Sat by Pulse 





Oximetry 











                                   Lab Results











  12/12/21 12/12/21 12/12/21 Range/Units





  09:58 10:00 10:00 


 


WBC  7.6    (4.5-11.0)  K/mm3


 


RBC  2.79 L    (3.65-5.03)  M/mm3


 


Hgb  8.3 L    (11.8-15.2)  gm/dl


 


Hct  26.2 L    (35.5-45.6)  %


 


MCV  94    (84-94)  fl


 


MCH  30    (28-32)  pg


 


MCHC  32    (32-34)  %


 


RDW  18.2 H    (13.2-15.2)  %


 


Plt Count  235    (140-440)  K/mm3


 


Lymph % (Auto)  9.1 L    (13.4-35.0)  %


 


Mono % (Auto)  9.9 H    (0.0-7.3)  %


 


Eos % (Auto)  3.3    (0.0-4.3)  %


 


Baso % (Auto)  0.9    (0.0-1.8)  %


 


Lymph # (Auto)  0.7 L    (1.2-5.4)  K/mm3


 


Mono # (Auto)  0.7    (0.0-0.8)  K/mm3


 


Eos # (Auto)  0.3    (0.0-0.4)  K/mm3


 


Baso # (Auto)  0.1    (0.0-0.1)  K/mm3


 


Seg Neutrophils %  76.8 H    (40.0-70.0)  %


 


Seg Neutrophils #  5.8    (1.8-7.7)  K/mm3


 


PT   16.2 H   (12.2-14.9)  Sec.


 


INR   1.17 H   (0.87-1.13)  


 


APTT   31.7   (24.2-36.6)  Sec.


 


Sodium    138  (137-145)  mmol/L


 


Potassium    4.5  (3.6-5.0)  mmol/L


 


Chloride    97.3 L  ()  mmol/L


 


Carbon Dioxide    25  (22-30)  mmol/L


 


Anion Gap    20  mmol/L


 


BUN    51 H  (9-20)  mg/dL


 


Glucose    104 H  ()  mg/dL


 


Lactic Acid     (0.7-2.0)  mmol/L


 


Calcium    10.0  (8.4-10.2)  mg/dL


 


Magnesium    1.80  (1.7-2.3)  mg/dL


 


Total Bilirubin    0.80  (0.1-1.2)  mg/dL


 


AST    17  (5-40)  units/L


 


ALT    14  (7-56)  units/L


 


Alkaline Phosphatase    322 H  ()  units/L


 


Troponin T    0.128 H*  (0.00-0.029)  ng/mL


 


Total Protein    6.9  (6.3-8.2)  g/dL


 


Albumin    3.7 L  (3.9-5)  g/dL


 


Albumin/Globulin Ratio    1.2  %














  12/12/21 Range/Units





  10:00 


 


WBC   (4.5-11.0)  K/mm3


 


RBC   (3.65-5.03)  M/mm3


 


Hgb   (11.8-15.2)  gm/dl


 


Hct   (35.5-45.6)  %


 


MCV   (84-94)  fl


 


MCH   (28-32)  pg


 


MCHC   (32-34)  %


 


RDW   (13.2-15.2)  %


 


Plt Count   (140-440)  K/mm3


 


Lymph % (Auto)   (13.4-35.0)  %


 


Mono % (Auto)   (0.0-7.3)  %


 


Eos % (Auto)   (0.0-4.3)  %


 


Baso % (Auto)   (0.0-1.8)  %


 


Lymph # (Auto)   (1.2-5.4)  K/mm3


 


Mono # (Auto)   (0.0-0.8)  K/mm3


 


Eos # (Auto)   (0.0-0.4)  K/mm3


 


Baso # (Auto)   (0.0-0.1)  K/mm3


 


Seg Neutrophils %   (40.0-70.0)  %


 


Seg Neutrophils #   (1.8-7.7)  K/mm3


 


PT   (12.2-14.9)  Sec.


 


INR   (0.87-1.13)  


 


APTT   (24.2-36.6)  Sec.


 


Sodium   (137-145)  mmol/L


 


Potassium   (3.6-5.0)  mmol/L


 


Chloride   ()  mmol/L


 


Carbon Dioxide   (22-30)  mmol/L


 


Anion Gap   mmol/L


 


BUN   (9-20)  mg/dL


 


Glucose   ()  mg/dL


 


Lactic Acid  0.80  (0.7-2.0)  mmol/L


 


Calcium   (8.4-10.2)  mg/dL


 


Magnesium   (1.7-2.3)  mg/dL


 


Total Bilirubin   (0.1-1.2)  mg/dL


 


AST   (5-40)  units/L


 


ALT   (7-56)  units/L


 


Alkaline Phosphatase   ()  units/L


 


Troponin T   (0.00-0.029)  ng/mL


 


Total Protein   (6.3-8.2)  g/dL


 


Albumin   (3.9-5)  g/dL


 


Albumin/Globulin Ratio   %














- EKG Data


-: EKG Interpreted by Me


EKG shows normal: sinus rhythm


Rate: normal





- EKG Data





12/12/21 11:58


The EKG is interpreted at 11: 45





Sinus rhythm, 79 bpm.  Normal axis, normal intervals, left ventricular 

hypertrophy, poor R wave progression.  There is motion artifact.  This is an 

abnormal EKG.  This is not a STEMI.





- Radiology Data


Radiology results: pending, report reviewed, image reviewed





CHEST 1 VIEW 12/12/2021 9:33 AM  INDICATION / CLINICAL INFORMATION: Dyspnea.  

COMPARISON: 12/8/2021  FINDINGS:  SUPPORT DEVICES: Permacath appears unchanged 

HEART / MEDIASTINUM: There is enlargement of the cardiac silhouette. 

Atherosclerotic calcifications are noted in the aortic arch LUNGS / PLEURA: 

Bilateral pulmonary opacities persist. Given differences in technique there is 

no significant change. No pneumothorax.  ADDITIONAL FINDINGS: No significant 

additional findings.  IMPRESSION: 1. No significant change.  Signer Name: Niranjan Israel MD Signed: 12/12/2021 8:48 AM Workstation Name: [id number]


Critical care attestation.: 


If time is entered above; I have spent that time in minutes in the direct care 

of this critically ill patient, excluding procedure time.








ED Disposition


Clinical Impression: 


 End stage renal disease, Hypoxia, History of hematuria, History of rectal 

bleeding, Abnormal chest x-ray





Disposition: 09 ADMITTED AS INPATIENT


Is pt being admited?: Yes


Does the pt Need Aspirin: No


Condition: Good


Referrals: 


PRIMARY CARE,MD [Primary Care Provider] - 3-5 Days

## 2021-12-13 VITALS — SYSTOLIC BLOOD PRESSURE: 167 MMHG | DIASTOLIC BLOOD PRESSURE: 96 MMHG

## 2021-12-13 LAB
BASOPHILS # (AUTO): 0 K/MM3 (ref 0–0.1)
BASOPHILS NFR BLD AUTO: 0.2 % (ref 0–1.8)
BUN SERPL-MCNC: 60 MG/DL (ref 9–20)
BUN/CREAT SERPL: 7 %
CALCIUM SERPL-MCNC: 9.4 MG/DL (ref 8.4–10.2)
EOSINOPHIL # BLD AUTO: 0 K/MM3 (ref 0–0.4)
EOSINOPHIL NFR BLD AUTO: 0 % (ref 0–4.3)
HCT VFR BLD CALC: 24.4 % (ref 35.5–45.6)
HEMOLYSIS INDEX: 0
HGB BLD-MCNC: 7.7 GM/DL (ref 11.8–15.2)
LYMPHOCYTES # BLD AUTO: 0.4 K/MM3 (ref 1.2–5.4)
LYMPHOCYTES NFR BLD AUTO: 7.1 % (ref 13.4–35)
MCHC RBC AUTO-ENTMCNC: 31 % (ref 32–34)
MCV RBC AUTO: 95 FL (ref 84–94)
MONOCYTES # (AUTO): 0.2 K/MM3 (ref 0–0.8)
MONOCYTES % (AUTO): 2.7 % (ref 0–7.3)
PLATELET # BLD: 221 K/MM3 (ref 140–440)
RBC # BLD AUTO: 2.58 M/MM3 (ref 3.65–5.03)

## 2021-12-13 RX ADMIN — HEPARIN SODIUM SCH: 5000 INJECTION, SOLUTION INTRAVENOUS; SUBCUTANEOUS at 10:51

## 2021-12-13 RX ADMIN — OXYCODONE HYDROCHLORIDE AND ACETAMINOPHEN SCH MG: 500 TABLET ORAL at 10:50

## 2021-12-13 RX ADMIN — Medication SCH ML: at 10:50

## 2021-12-13 RX ADMIN — Medication SCH MG: at 10:50

## 2021-12-13 NOTE — PROGRESS NOTE
Subjective


Interval history: 


Patient was seen today for follow-up of multiple renal related issues


No complaints of any chest pain pressure or shortness of breath


Interdisciplinary notes that also reviewed


Events of 24 hours vitals labs intake output medications were reviewed








Past medical history: Reviewed


Family history: Reviewed


Social history: Reviewed


Allergies: Reviewed








Physical examination:


Vitals: Reviewed


HEENT: No pallor or icterus oral mucosa moist 


Neck: Supple no JVD no thyromegaly


Chest: Bilateral clear to auscultation anteriorly


Heart: Regular rate and rhythm S1-S2 heard no S3-S4


Abdomen: Soft nontender no voluntary guarding rigidity rebound


Extremity: Dry skin less than 1+ peripheral edema


Psychiatric: No evidence of agitation and aggression noted


Dermatology: No petechial rashes





Labs and x-rays: Reviewed from today





Assessment and plan


#End-stage renal disease: Patient is currently on maintenance hemodialysis and 

will need to be dialyzed today for volume overload, very poorly compliant in the

outpatient setting, may need additional treatment depending


Will monitor dialysis-related labs periodically.


Hemodialysis nurse to ultrafiltrate as tolerated, systolic blood pressure must 

be kept above 100, heart rate below 100





#Gross hematuria: Needs to be seen and followed by urology





#Current access is a permacath, has a left AV fistula, needs to be seen by 

vascular, has had complicated access in the past and has not followed with 

vascular surgery properly in the past





#Medication management: Reviewed today





#Electrolyte and volume: Chronic issues with fluid overload despite ongoing 

counseling and education continue not to comply in the outpatient setting 

resulting in frequent hospitalization does not follow-up with physicians like he

should








#Anemia in ESRD: Complicated by GI bleed, has been advised to see 

gastroenterology in the past but has not


To monitor hemoglobin and hematocrit periodically erythropoietin as needed,





#Bone mineral disorder and secondary hyperparathyroidism: 


Goal phosphorus under 5-1/2 PTH under 600


Monitor phosphorus and PTH level periodically,





#Cardiovascular: Atrial fibrillation, congestive heart failure volume overload, 

very poor compliance





#Diet and nutrition: Nepro, multivitamin 





#End-stage kidney disease Counseling: 


Done regarding all the issues related to end-stage kidney disease care at length

regarding diet lifestyle changes fluid restriction sodium restriction and blood 

pressure monitoring from home





#Prognosis very poor mortality risk is high

















We'll continue to follow and make recommendation for renal standpoint








Objective





- Vital Signs


Vital signs: 


                               Vital Signs - 12hr











  12/12/21 12/13/21 12/13/21





  22:08 00:30 00:35


 


Pulse Rate 102 H  


 


Respiratory   15





Rate   


 


Blood Pressure 174/99 201/112 














  12/13/21 12/13/21





  01:05 06:02


 


Pulse Rate  100 H


 


Respiratory 17 





Rate  


 


Blood Pressure  206/127














- Lab





                                 12/13/21 04:30





                                 12/13/21 04:30


                             Most recent lab results











Calcium  9.4 mg/dL (8.4-10.2)   12/13/21  04:30    


 


Magnesium  1.80 mg/dL (1.7-2.3)   12/12/21  10:00    














Medications & Allergies





- Medications


Allergies/Adverse Reactions: 


                                    Allergies





diphenhydramine Adverse Reaction (Mild, Verified 12/08/21 11:39)


   fainting








Home Medications: 


                                Home Medications











 Medication  Instructions  Recorded  Confirmed  Last Taken  Type


 


NIFEdipine XL [Procardia Xl] 90 mg PO QDAY #30 tablet 10/21/21 12/08/21 12/07/21

Rx


 


cloNIDine [Catapres] 0.1 mg PO Q12HR #60 tablet 10/21/21 12/08/21 12/07/21 Rx


 


hydrALAZINE [Apresoline TAB] 100 mg PO TID #90 tab 10/21/21 12/08/21 12/07/21 Rx


 


Pantoprazole [Protonix TAB] 40 mg PO DAILY 10/25/21 12/08/21 12/07/21 History


 


Cinacalcet [Sensipar] 30 mg PO DAILY #30 tablet 11/17/21 12/08/21 11/24/21 Rx


 


carvediloL [Coreg] 25 mg PO BID 11/29/21 12/08/21 12/07/21 History


 


Furosemide [Lasix TAB] 40 mg PO QDAY #30 tablet 12/09/21  Unknown Rx











Active Medications: 














Generic Name Dose Route Start Last Admin





  Trade Name Freq  PRN Reason Stop Dose Admin


 


Acetaminophen  650 mg  12/12/21 11:50 





  Acetaminophen 325 Mg Tab  PO  





  Q4H PRN  





  Pain MILD(1-3)/Fever >100.5/HA  


 


Al Hydrox/Mg Hydrox/Simethicone  30 ml  12/12/21 23:58 





  Alum-Mag Hydroxide-Simethicone 800-119-41ub/5ml Oral Liqd 30 Ml  PO  





  Q4H PRN  





  Indigestion  


 


Albuterol  2.5 mg  12/12/21 11:50 





  Albuterol 2.5 Mg/3 Ml Nebu  IH  





  Q4HRT PRN  





  Shortness Of Breath  


 


Ascorbic Acid  500 mg  12/12/21 22:00  12/12/21 22:08





  Ascorbic Acid 500 Mg Tab  PO   500 mg





  BID ARRON   Administration


 


Azithromycin  500 mg  12/13/21 16:00 





  Azithromycin 250 Mg Tab  PO  12/16/21 16:01 





  Q24H [facility]  


 


Cholecalciferol  1,000 unit  12/13/21 10:00 





  Cholecalciferol (Vit D3) 400 Unit Tab  PO  





  QDAY [facility]  


 


Cinacalcet  30 mg  12/13/21 10:00 





  Cinacalcet 30 Mg Tab  PO  





  DAILY [facility]  


 


Clonidine HCl  0.1 mg  12/12/21 22:00  12/12/21 22:08





  Clonidine 0.1 Mg Tab  PO   0.1 mg





  Q12HR [facility]   Administration


 


Furosemide  40 mg  12/13/21 10:00 





  Furosemide 40 Mg Tab  PO  





  QDAY [facility]  


 


Heparin Sodium (Porcine)  5,000 unit  12/12/21 22:00  12/12/21 22:07





  Heparin 5,000 Unit/1 Ml Vial  SUB-Q   5,000 unit





  Q12HR [facility]   Administration


 


Hydralazine HCl  100 mg  12/12/21 14:00  12/12/21 22:09





  Hydralazine 100 Mg Tab  PO   100 mg





  TID [facility]   Administration


 


Hydralazine HCl  10 mg  12/13/21 05:57  12/13/21 06:02





  Hydralazine 20 Mg/1 Ml Inj  IV   10 mg





  Q4HR PRN   Administration





  Hypertension  


 


Hydromorphone HCl  0.5 mg  12/12/21 11:50  12/13/21 00:35





  Hydromorphone 1 Mg/1 Ml Inj  IV   0.5 mg





  Q23H PRN   Administration





  Pain , Severe (7-10)  


 


Sodium Chloride  100 mls @ 999 mls/hr  12/12/21 13:54 





  Nacl 0.9%  IV  





  QUITA PRN  





  Hypotension  


 


Ceftriaxone Sodium  2 gm in 100 mls @ 200 mls/hr  12/12/21 16:00 





  Rocephin/Ns 2 Gm/100 Ml  IV  12/16/21 16:29 





  Q24H [facility]  





  Protocol  


 


Methylprednisolone Sodium Succinate  40 mg  12/12/21 22:00  12/12/21 22:08





  Methylprednisolone Sod Succinate 40 Mg/1 Ml Inj  IV   40 mg





  Q8HR [facility]   Administration


 


Nifedipine  90 mg  12/13/21 10:00 





  Nifedipine Xl 90 Mg Tab  PO  





  QDAY [facility]  


 


Ondansetron HCl  4 mg  12/12/21 11:50 





  Ondansetron 4 Mg/2 Ml Inj  IV  





  Q8H PRN  





  Nausea And Vomiting  


 


Oxycodone/Acetaminophen  1 tab  12/12/21 11:50 





  Oxycodone /Acetaminophen 5-325mg Tab  PO  





  Q16H PRN  





  Pain, Moderate (4-6)  


 


Pantoprazole Sodium  40 mg  12/13/21 10:00 





  Pantoprazole 40 Mg Tab  PO  





  DAILY [facility]  


 


Sodium Chloride  10 ml  12/12/21 22:00  12/12/21 22:09





  Sodium Chloride 0.9% 10 Ml Flush Syringe  IV   10 ml





  BID ARRON   Administration


 


Sodium Chloride  10 ml  12/12/21 11:50 





  Sodium Chloride 0.9% 10 Ml Flush Syringe  IV  





  PRN PRN  





  LINE FLUSH  


 


Zinc Sulfate  220 mg  12/12/21 22:00  12/12/21 22:08





  Zinc Sulfate 220 Mg Cap  PO   220 mg





  BID ARRON   Administration

## 2021-12-13 NOTE — DISCHARGE SUMMARY
Providers





- Providers


Date of Admission: 


12/12/21 13:02





Date of discharge: 12/13/21


Attending physician: 


REGINALD ARGUELLO





                                        





12/12/21 09:28


Consult to Physician [CONS] Urgent 


   Comment: 


   Consulting Provider: PADMINI MACIAS


   Physician Instructions: 


   Reason For Exam: esrd hypoxia





12/13/21 13:10


Consult to Physician [CONS] Routine 


   Comment: 


   Consulting Provider: MARY BLANKENSHIP


   Physician Instructions: 


   Reason For Exam: Malfunction AV fistula per nephrology











Primary care physician: 


PRIMARY CARE MD








Hospitalization


Condition: Good


Procedures: 





Hemodialysis X1 session


Hospital course: 





History of present illness: 


50 YO Male with ESRD on HD(M,W,F), GERD, HTN, Paroxysmal Atrial Fib not on 

therapeutic anticoagulation presents to ED for evaluation.  Patient reports "I 

feel bad".  Patient states that he has experienced generalized weakness, 

fatigue, malaise, body aches, and shortness of breath over the past 3 days with 

persistent symptoms over the same timeframe.  Patient also reports blood in 

urine which has now resolved.  EMS notified and upon arrival the patient was 

found to be in distress and subsequent transported to [facility] for further care and 

evaluation of the aforementioned symptoms.  The patient was seen and evaluated 

in the emergency department.  All lab and imaging studies reviewed.  Patient 

found to have a pulse oximetry of 89% on room air which is consistent with acute

 hypoxemic respiratory failure.  Chest x-ray revealed bilateral pneumonia.  

Patient admitted to medical floor and initiated on pneumonia protocol as well as

 coronavirus protocol.  Patient has fever, chills, chest pain, palpitation, 

productive cough, skin rash, recent ill contacts, or known exposure to COVID-19.

  Prior admission on 12/8/2021 reviewed.  All medication listed at time of 

admission has been reconciled.  Advanced care planning conducted in ED.





Patient underwent hemodialysis today


Patient is asymptomatic


1 out of 2 bottles--blood cultures positive for cocci in clusters.


Patient is afebrile throughout the admission.


No signs of infection.


Patient has a permacath on the right which is being used now.


AV fistula needs to be evaluated by vascular surgery/IR as inpatient or 

outpatient





Patient has appointment with Dr. Blankenship as outpatient.


Family counseled about the importance of keeping the appointment.


No hematuria at all.--No need for urology consult





Discussed with sister about the diagnosis and prognosis and the need for HD on a

 regular basis


Also to get Ultrasound as outpatient on Tuesdays or Thursdays.


Patient eats lots of ice because of his anemia which causes fluid overload 

unknowingly.


Sister and the patient counseled about not eating ice and not drinking too many 

fluids and attending hemodialysis on a regular basis














Assessment and Plan





- Patient Problems


(1) Acute hypoxemic respiratory failure


Current Visit: Yes   Status: Acute   


Plan to address problem: 


Improved


On room air


Covid test negative





(2)Volume overload


Increased ultrafiltration during hemodialysis


Patient needs to keep his appointments with the hemodialysis center and other 

physicians





(3) Pneumonia ruled out 


Current Visit: Yes   Status: Acute   


Plan to address problem: 


Secondary to fluid overload


Pneumonia ruled out


Volume overload





(4) Suspected 2019 novel coronavirus infection


Current Visit: Yes   Status: Acute   


Plan to address problem: 


Covid negative





(5) End stage renal disease


Current Visit: Yes   Status: Acute   


Plan to address problem: 


Volume overload


Patient underwent hemodialysis today





(6) Atrial fibrillation


Current Visit: Yes   Status: Acute   


Plan to address problem: 


Rate control.  Patient not currently taking therapeutic anticoagulation due to 

high risk of bleeding as per cardiology recommendations.











Disposition: 01 HOME / SELF CARE / HOMELESS


Final Discharge Diagnosis (Prints w/discharge instructions): Acute respiratory 

failure with hypoxia.  Volume overload.  PUI-Covid ruled out.  End-stage renal 

disease.  Atrial fibrillation


Time spent for discharge: 33 minutes





- Discharge Diagnoses


(1) Acute hypoxemic respiratory failure


Status: Acute   





(2) Atrial fibrillation


Status: Acute   





(3) End stage renal disease


Status: Acute   





(4) History of hematuria


Status: Acute   Comment: No hematuria


No need for urology consult   





(5) Suspected 2019 novel coronavirus infection


Status: Suspected   Comment: Covid ruled out   





(6) Anemia due to end stage renal disease


Status: Chronic   Comment: Epogen as per nephrology


Eats lots of ice which is causing his fluid overload   





(7) Volume overload


Status: Acute   Comment: Missed hemodialysis and excess  fluid intake causing 

volume overload and shortness of breath   





Core Measure Documentation





- Palliative Care


Palliative Care/ Comfort Measures: Not Applicable





- Core Measures


Any of the following diagnoses?: none





Exam





- Constitutional


Vitals: 


                                        











Temp Pulse Resp BP Pulse Ox


 


 98.0 F   100 H  17   206/127   95 


 


 12/12/21 20:02  12/13/21 06:02  12/13/21 01:05  12/13/21 06:02  12/13/21 12:02











General appearance: Present: no acute distress, well-nourished





- EENT


Eyes: Present: PERRL


ENT: hearing intact, clear oral mucosa





- Neck


Neck: Present: supple, normal ROM





- Respiratory


Respiratory effort: normal


Respiratory: bilateral: CTA





- Cardiovascular


Heart rate: 78


Rhythm: regular


Heart Sounds: Present: S1 & S2.  Absent: rub, click





- Extremities


Extremities: no ischemia, pulses intact, pulses symmetrical, No edema


Peripheral Pulses: within normal limits





- Abdominal


General gastrointestinal: Present: soft, non-tender, non-distended, normal bowel

 sounds


Male genitourinary: Present: normal





- Rectal


Rectal Exam: deferred





- Integumentary


Integumentary: Present: clear, warm, dry





- Musculoskeletal


Musculoskeletal: gait normal, strength equal bilaterally





- Psychiatric


Psychiatric: appropriate mood/affect, intact judgment & insight





- Neurologic


Neurologic: CNII-XII intact, moves all extremities





- Allied Health


Allied health notes reviewed: nursing, case management





Plan


Activity: no restrictions


Diet: renal


Special Instructions: restrict fluid intake to (700 mL)


Durable Medical Equipment Needed Upon Discharge: other (None)


Follow up with: 


PRIMARY CARE,MD [Primary Care Provider] - 3-5 Days


JANICE VENEGAS MD [Staff Physician] - 7 Days


Forms:  Accompanied Note


Prescriptions: 


hydrALAZINE [Apresoline TAB] 50 mg PO TID #90 tab


cloNIDine [Catapres] 0.1 mg PO Q12HR #60 tablet


NIFEdipine XL [Procardia Xl] 90 mg PO QDAY #30 tablet


Pantoprazole [Protonix TAB] 40 mg PO DAILY #30 tablet


Cinacalcet [Sensipar] 30 mg PO DAILY #30 tablet

## 2021-12-13 NOTE — EVENT NOTE
Date: 12/13/21


I evaluated the patient. 





The seroma is nearly resolved. There is a good thrill on the AV access.





Ordered ultrasound. If he is being discharged me will not be able to have it 

performed. Will perform as an outpatient.





Patient cannot followup up on Friday in the office for unclear reasons with 

transportion.





Will try to arrange with his dialysis center but patient's limited 

transportation issues make his care complicated.

## 2021-12-14 NOTE — ELECTROCARDIOGRAPH REPORT
[facility]

                                       

Test Date:    2021               Test Time:    07:54:42

Pat Name:     RONEY RODRIGUEZ             Department:   

Patient ID:   [id number]          Room:         A367

Gender:       M                        Technician:   GP

:          1970               Requested By: REGINALD ARGUELLO

Order Number: [id number]              Reading MD:   Lita Black

                                 Measurements

Intervals                              Axis          

Rate:         99                       P:            89

GA:           243                      QRS:          120

QRSD:         121                      T:            -11

QT:           361                                    

QTc:          463                                    

                           Interpretive Statements

Sinus rhythm

Prolonged GA interval

Right bundle branch block

Compared to ECG 2021 07:02:20

Right bundle-branch block now present

Electronically Signed On 2021 14:24:04 EST by Lita Black

## 2021-12-14 NOTE — ELECTROCARDIOGRAPH REPORT
[facility]

                                       

Test Date:    2021               Test Time:    11:45:04

Pat Name:     RONEY RODRIGUEZ             Department:   

Patient ID:   [id number]          Room:         A367

Gender:       M                        Technician:   ARABELLA

:          1970               Requested By: DEENA AWAD

Order Number: [id number]              Reading MD:   Lita Black

                                 Measurements

Intervals                              Axis          

Rate:         79                       P:            -7

NE:           170                      QRS:          57

QRSD:         93                       T:            74

QT:           378                                    

QTc:          435                                    

                           Interpretive Statements

Sinus rhythm

Poor R wave progression, consider old anterior infarct

Compared to ECG 2021 07:54:42

Right bundle branch block is no longer evident

Electronically Signed On 2021 14:26:12 EST by Lita Black

## 2021-12-19 ENCOUNTER — HOSPITAL ENCOUNTER (EMERGENCY)
Dept: HOSPITAL 5 - ED | Age: 51
Discharge: HOME | End: 2021-12-19
Payer: MEDICARE

## 2021-12-19 VITALS — SYSTOLIC BLOOD PRESSURE: 204 MMHG | DIASTOLIC BLOOD PRESSURE: 108 MMHG

## 2021-12-19 DIAGNOSIS — J45.909: ICD-10-CM

## 2021-12-19 DIAGNOSIS — I13.2: Primary | ICD-10-CM

## 2021-12-19 DIAGNOSIS — N18.6: ICD-10-CM

## 2021-12-19 LAB
ALBUMIN SERPL-MCNC: 3.9 G/DL (ref 3.9–5)
ALT SERPL-CCNC: 12 UNITS/L (ref 7–56)
BASOPHILS # (AUTO): 0 K/MM3 (ref 0–0.1)
BASOPHILS NFR BLD AUTO: 0.7 % (ref 0–1.8)
BUN SERPL-MCNC: 61 MG/DL (ref 9–20)
BUN/CREAT SERPL: 8 %
CALCIUM SERPL-MCNC: 10.2 MG/DL (ref 8.4–10.2)
EOSINOPHIL # BLD AUTO: 0.3 K/MM3 (ref 0–0.4)
EOSINOPHIL NFR BLD AUTO: 3.9 % (ref 0–4.3)
HCT VFR BLD CALC: 24.3 % (ref 35.5–45.6)
HEMOLYSIS INDEX: 2
HGB BLD-MCNC: 7.7 GM/DL (ref 11.8–15.2)
LYMPHOCYTES # BLD AUTO: 0.9 K/MM3 (ref 1.2–5.4)
LYMPHOCYTES NFR BLD AUTO: 11.4 % (ref 13.4–35)
MCHC RBC AUTO-ENTMCNC: 32 % (ref 32–34)
MCV RBC AUTO: 96 FL (ref 84–94)
MONOCYTES # (AUTO): 0.7 K/MM3 (ref 0–0.8)
MONOCYTES % (AUTO): 9.9 % (ref 0–7.3)
PLATELET # BLD: 211 K/MM3 (ref 140–440)
RBC # BLD AUTO: 2.55 M/MM3 (ref 3.65–5.03)

## 2021-12-19 PROCEDURE — 36415 COLL VENOUS BLD VENIPUNCTURE: CPT

## 2021-12-19 PROCEDURE — 93005 ELECTROCARDIOGRAM TRACING: CPT

## 2021-12-19 PROCEDURE — 99284 EMERGENCY DEPT VISIT MOD MDM: CPT

## 2021-12-19 PROCEDURE — 71046 X-RAY EXAM CHEST 2 VIEWS: CPT

## 2021-12-19 PROCEDURE — 80053 COMPREHEN METABOLIC PANEL: CPT

## 2021-12-19 PROCEDURE — 85025 COMPLETE CBC W/AUTO DIFF WBC: CPT

## 2021-12-19 NOTE — EMERGENCY DEPARTMENT REPORT
ED Shortness of Breath HPI





- General


Chief Complaint: Dyspnea/Respdistress


Stated Complaint: ROSALBA


Time Seen by Provider: 12/19/21 09:33


Source: patient, EMS


Mode of arrival: Ambulatory


Limitations: No Limitations





- History of Present Illness


Initial Comments: 





Chief complaint: I have been having shortness of breath and chest pain since 

August.





HPI: This is a 51-year-old male with history of end-stage renal disease on 

hemodialysis Monday Wednesday Friday, atrial fibrillation, congestive heart 

failure, GERD, medication noncompliance, hypertension, diabetes mellitus who 

presents with shortness of breath and chest pain for the past 3 months.  Due to 

chronic chest pain or shortness of breath he has difficulty walking from [employer] to dialysis.  Consequently he chose to use ambulance service to come to 

the hospital in order to obtain dialysis.  He had scheduled dialysis today for 

holiday schedule.  He admits that he does not have any chest pain or shortness 

of breath.  His chronic symptoms make it difficult for him to ambulate.





I have reviewed electronic record.  





Person nephrologist Dr. Naranjo





Patient recently discharged last week 6 days ago for acute hypoxemic respiratory

failure.


MD Complaint: shortness of breath, chest pain


-: Gradual, month(s) (3 months)


Severity: mild


Consistency: constant


Improves With: rest


Worsens With: exertion


Associated Symptoms: chest pain





- Related Data


                                Home Medications











 Medication  Instructions  Recorded  Confirmed  Last Taken


 


RX: carvediloL [Coreg] 25 mg PO BID 11/29/21 12/08/21 12/07/21








                                  Previous Rx's











 Medication  Instructions  Recorded  Last Taken  Type


 


RX: Furosemide [Lasix TAB] 40 mg PO QDAY #30 tablet 12/09/21 Unknown Rx


 


RX: Cinacalcet [Sensipar] 30 mg PO DAILY #30 tablet 12/13/21 Unknown Rx


 


RX: Epoetin Amadeo-Epbx 10,000 Unit 10,000 unit IV QUITA PRN  vial 12/13/21 Unknown 

Rx





[Retacrit]    


 


RX: NIFEdipine XL [Procardia Xl] 90 mg PO QDAY #30 tablet 12/13/21 Unknown Rx


 


RX: Pantoprazole [Protonix TAB] 40 mg PO DAILY #30 tablet 12/13/21 Unknown Rx


 


RX: cloNIDine [Catapres] 0.1 mg PO Q12HR #60 tablet 12/13/21 Unknown Rx


 


RX: hydrALAZINE [Apresoline TAB] 50 mg PO TID #90 tab 12/13/21 Unknown Rx











                                    Allergies











Allergy/AdvReac Type Severity Reaction Status Date / Time


 


diphenhydramine AdvReac Mild fainting Verified 12/08/21 11:39














ED Review of Systems


ROS: 


Stated complaint: ROSALBA


Other details as noted in HPI





Comment: All other systems reviewed and negative


Constitutional: denies: chills, fever, malaise


Respiratory: shortness of breath


Cardiovascular: chest pain


Gastrointestinal: denies: abdominal pain, nausea, vomiting





ED Past Medical Hx





- Past Medical History


Previous Medical History?: Yes


Hx Hypertension: Yes


Hx Heart Attack/AMI: No


Hx Congestive Heart Failure: No


Hx Diabetes: No


Hx Liver Disease: No


Hx Renal Disease: Yes (HD MWF - last on 12/13/21)


Hx Asthma: Yes


Hx COPD: No


Additional medical history: Glaucoma.  afib





- Surgical History


Past Surgical History?: Yes


Hx Pacemaker: No


Additional Surgical History: Left A/V Graft, right chest Vas-Cath





- Social History


Smoking Status: Never Smoker





- Medications


Home Medications: 


                                Home Medications











 Medication  Instructions  Recorded  Confirmed  Last Taken  Type


 


RX: carvediloL [Coreg] 25 mg PO BID 11/29/21 12/08/21 12/07/21 History


 


RX: Furosemide [Lasix TAB] 40 mg PO QDAY #30 tablet 12/09/21  Unknown Rx


 


RX: Cinacalcet [Sensipar] 30 mg PO DAILY #30 tablet 12/13/21  Unknown Rx


 


RX: Epoetin Amadeo-Epbx 10,000 Unit 10,000 unit IV QUITA PRN  vial 12/13/21  Unknown

 Rx





[Retacrit]     


 


RX: NIFEdipine XL [Procardia Xl] 90 mg PO QDAY #30 tablet 12/13/21  Unknown Rx


 


RX: Pantoprazole [Protonix TAB] 40 mg PO DAILY #30 tablet 12/13/21  Unknown Rx


 


RX: cloNIDine [Catapres] 0.1 mg PO Q12HR #60 tablet 12/13/21  Unknown Rx


 


RX: hydrALAZINE [Apresoline TAB] 50 mg PO TID #90 tab 12/13/21  Unknown Rx














ED Physical Exam





- General


Limitations: No Limitations


General appearance: alert, in no apparent distress





- Head


Head exam: Present: atraumatic, normocephalic





- Eye


Eye exam: Present: normal appearance





- ENT


ENT exam: Present: mucous membranes moist





- Neck


Neck exam: Present: normal inspection, full ROM





- Respiratory


Respiratory exam: Present: normal lung sounds bilaterally, rales.  Absent: 

respiratory distress, wheezes, rhonchi





- Cardiovascular


Cardiovascular Exam: Present: regular rate, normal rhythm, normal heart sounds. 

Absent: systolic murmur, diastolic murmur, rubs, gallop





- GI/Abdominal


GI/Abdominal exam: Present: soft, normal bowel sounds.  Absent: distended, 

tenderness, guarding





- Rectal


Rectal exam: Present: deferred





- Extremities Exam


Extremities exam: Present: normal inspection





- Neurological Exam


Neurological exam: Present: alert, oriented X3





- Psychiatric


Psychiatric exam: Present: normal affect, normal mood





- Skin


Skin exam: Present: warm, dry, intact, normal color.  Absent: rash





ED Course





                                   Vital Signs











  12/19/21 12/19/21





  07:34 09:02


 


Temperature 97.9 F 98.7 F


 


Pulse Rate 93 H 96 H


 


Respiratory 16 20





Rate  


 


Blood Pressure 180/98 204/108





[Right]  


 


O2 Sat by Pulse 93 93





Oximetry  














ED Medical Decision Making





- Lab Data


Result diagrams: 


                                 12/19/21 09:39





                                 12/19/21 09:39





- EKG Data


-: EKG Interpreted by Me


EKG shows normal: sinus rhythm, axis, intervals


Rate: normal





- EKG Data





12/19/21 12:05


EKG obtained 1028


EKG interpreted by me


Rate 90 bpm normal axis normal intervals no ST elevation positive LVH





- Radiology Data


Radiology results: report reviewed





Patient Name: RONEY RODRIGUEZ


Patient ID: [id number]


Gender: Male


YOB: 1970


Home Phone: [phone]


Referring Provider: EUNICE RIGGS


Organization: [facility]


Accession Number: [id number]


Requested Date: December 19, 2021 09:34


Report Status: Final


Requested Procedure: 1


Procedure Description: XR chest routine 2V


Modality: XR


Findings


Reporting MD: Vincent Wiggins


Dictation Time: December 19, 2021 09:00


: Not available


Transcription Date:


CHEST 2 VIEWS 


INDICATION / CLINICAL INFORMATION: 


Dyspnea. 


COMPARISON: 


12/12/2021 


FINDINGS: 


SUPPORT DEVICES: Stable satisfactory device positioning. 


HEART / MEDIASTINUM: Stable. 


LUNGS / PLEURA: Stable diffuse bilateral pulmonary opacities. 


ADDITIONAL FINDINGS: No significant additional findings. 


IMPRESSION: 


1. No adverse change from the prior exam. 


Signer Name: Vincent Wiggins MD 


Signed: 12/19/2021 9:00 AM 


Workstation Name: [id number]





- Medical Decision Making





End-stage renal disease on hemodialysis history of medication noncompliance.  

Patient chose to come to the emergency department for assistance with 

transportation.  He did not want to walk from [facility] to dialysis clinic. 

 He does not require emergent dialysis at this time.





I discussed case with Dr. Blankenship associate of Dr. Naranjo who agreed the 

patient did not require emergent hemodialysis.  





Hypertensive urgency: Patient will take his home medications.  No evidence of en

dorgan damage.








Patient is no apparent distress.  He ambulates without difficulty.  He does not 

exhibit any work of breathing.  He appears comfortable.


Critical care attestation.: 


If time is entered above; I have spent that time in minutes in the direct care 

of this critically ill patient, excluding procedure time.








ED Disposition


Clinical Impression: 


 End-stage renal disease on hemodialysis, Hypertensive urgency





Disposition: 01 HOME / SELF CARE / HOMELESS


Is pt being admited?: No


Does the pt Need Aspirin: No


Condition: Stable


Additional Instructions: 


Please follow-up with your hemodialysis clinic.


Referrals: 


PADMINI NARANJO MD [Staff Physician] - as needed

## 2021-12-19 NOTE — EVENT NOTE
ED Screening Note


Date of service: 12/19/21


Time: 09:56


ED Screening Note: 


51-year-old -American male presents to the emergency room complaining of 

shortness of breath and chest discomfort for the last hour.  Patient states he 

is a dialysis patient and goes Monday Wednesday and Friday.  Patient states that

on the holiday schedule he is supposed to go today but felt bad.


He goes to [facility] in [place].  Was noted that his blood pressure was 204/108 

and is satting around 92% on room air.  Patient is afebrile at this time and 

pulse is 96.








This initial assessment/diagnostic orders/clinical plan/treatment(s) is/are 

subject to change based on patients health status, clinical progression and re-

assessment by fellow clinical providers in the ED. Further treatment and workup 

at subsequent clinical providers discretion. Patient/guardian urged not to elope

from the ED as their condition may be serious if not clinically assessed and 

managed. 





Initial orders include: 


CBC CMP EKG chest x-ray

## 2021-12-19 NOTE — ELECTROCARDIOGRAPH REPORT
[facility]

                                       

Test Date:    2021               Test Time:    10:28:39

Pat Name:     RONEY RODRIGUEZ             Department:   

Patient ID:   [id number]          Room:          

Gender:       M                        Technician:   CHARLOTTE

:          1970               Requested By: EUNICE RIGGS

Order Number: [id number]              Reading MD:   Lavon Tolliver

                                 Measurements

Intervals                              Axis          

Rate:         89                       P:            53

MO:           200                      QRS:          16

QRSD:         92                       T:            62

QT:           357                                    

QTc:          434                                    

                           Interpretive Statements

Sinus rhythm

Probable left atrial enlargement

Probable left ventricular hypertrophy

Compared to ECG 2021 11:45:04

Myocardial infarct finding no longer present

Electronically Signed On 2021 12:05:24 EST by Lavon Tolliver

## 2021-12-19 NOTE — XRAY REPORT
CHEST 2 VIEWS 



INDICATION / CLINICAL INFORMATION:

Dyspnea.



COMPARISON: 

12/12/2021



FINDINGS:



SUPPORT DEVICES: Stable satisfactory device positioning.



HEART / MEDIASTINUM: Stable. 



LUNGS / PLEURA: Stable diffuse bilateral pulmonary opacities. 



ADDITIONAL FINDINGS: No significant additional findings.



IMPRESSION:

1. No adverse change from the prior exam.



Signer Name: Vincent Wiggins MD 

Signed: 12/19/2021 10:00 AM

Workstation Name: [id number]

## 2022-03-23 NOTE — CAT SCAN REPORT
CT ABDOMEN AND PELVIS WITHOUT CONTRAST



INDICATION: Upper abdominal pain.  Renal failure, on dialysis.



COMPARISON: None similar.



FINDINGS: Noncontrast abdomen and pelvis CT performed.



LUNG BASES: Mild cardiomegaly.  No effusions.  Slight basilar 

atelectasis.  Mild atherosclerotic calcifications.  Slight nonspecific 

distal esophageal prominence.



ABDOMEN: Please note that sensitivity to detect small visceral lesions 

is limited due to the absence of intravenous or oral contrast.  Few 

small splenic calcified granulomas.  Renal crossed fused ectopia 

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 

quadrant, contiguous/inferior to the right kidney.  No hydronephrosis 

or radiopaque renal calculi.  Left renal fossa noted occupied by bowel 

and pancreatic tail.  Otherwise grossly unremarkable unenhanced liver, 

spleen, gallbladder, pancreas, adrenals, nonaneurysmal abdominal aorta 

with atherosclerotic calcifications and IVC.  Nonopacified GI tract 

evaluation limited, though grossly nonobstructive.  Ascending colon 

incidentally noted coursing more anterior than usual in the right 

hemiabdomen.  No ascites.  Few small, subcentimeter mesenteric and 

retroperitoneal lymph nodes may be present.



PELVIS: Small pelvic simple attenuation free fluid, an abnormal finding 

in a male patient.  Grossly unremarkable prostate/seminal vesicles and 

nonopacified rectosigmoid.  Nonopacified urinary bladder wall slightly 

exaggerated, possibly related to underdistention with minimal 

surrounding fat stranding nonspecific.



No acute osseous abnormality.  Mild subcutaneous edema.



CONCLUSION: 



1.  Small amount of simple free fluid in the pelvis, though an abnormal 

finding in this male patient, etiology uncertain.



2.  Few other incidental findings, including cardiomegaly and crossed 

fused renal ectopia on this unenhanced exam, amongst others, as 

described.



I phoned the above results to Dr. Alamzan in the ER, 3:15 PM, 

01/16/2017.



Thank you for the opportunity to participate in this patient's care.
No assistance needed